# Patient Record
Sex: FEMALE | Race: WHITE | NOT HISPANIC OR LATINO | Employment: OTHER | URBAN - METROPOLITAN AREA
[De-identification: names, ages, dates, MRNs, and addresses within clinical notes are randomized per-mention and may not be internally consistent; named-entity substitution may affect disease eponyms.]

---

## 2017-01-09 ENCOUNTER — TELEPHONE (OUTPATIENT)
Dept: INTERNAL MEDICINE | Facility: CLINIC | Age: 62
End: 2017-01-09

## 2017-01-09 DIAGNOSIS — R11.0 NAUSEA: ICD-10-CM

## 2017-01-09 RX ORDER — ONDANSETRON HYDROCHLORIDE 8 MG/1
TABLET, FILM COATED ORAL
Qty: 30 TABLET | Refills: 0 | Status: SHIPPED | OUTPATIENT
Start: 2017-01-09 | End: 2017-03-03 | Stop reason: SDUPTHER

## 2017-01-09 NOTE — TELEPHONE ENCOUNTER
----- Message from Peri Philip sent at 1/9/2017  2:21 PM CST -----  Please schedule some labs for pt on 1-26-17. Pt can be reach at [phone].

## 2017-01-09 NOTE — TELEPHONE ENCOUNTER
Pt missed her lab appt in Nov. I got her rescheduled with a lipid panel, do you want to add any other labs at this time?

## 2017-01-10 DIAGNOSIS — I10 ESSENTIAL HYPERTENSION: ICD-10-CM

## 2017-01-10 DIAGNOSIS — Z00.00 ANNUAL PHYSICAL EXAM: Primary | ICD-10-CM

## 2017-01-16 ENCOUNTER — CLINICAL SUPPORT (OUTPATIENT)
Dept: SMOKING CESSATION | Facility: CLINIC | Age: 62
End: 2017-01-16
Payer: COMMERCIAL

## 2017-01-16 DIAGNOSIS — F17.210 SMOKES 11 TO 20 CIGARETTES PER DAY: Primary | ICD-10-CM

## 2017-01-16 PROCEDURE — 99999 PR PBB SHADOW E&M-EST. PATIENT-LVL II: CPT | Mod: PBBFAC,,,

## 2017-01-16 PROCEDURE — 99407 BEHAV CHNG SMOKING > 10 MIN: CPT | Mod: S$GLB,,, | Performed by: GENERAL PRACTICE

## 2017-01-16 NOTE — PROGRESS NOTES
Individual Follow-Up Form    1/16/2017      Clinical Status of Patient: Outpatient    Length of Service: 15 minutes    Continuing Medication: yes  Patches    Other Medications: none     Target Symptoms: Withdrawal and medication side effects. The following were  rated moderate (3) to severe (4) on TCRS:  · Moderate (3): none  · Severe (4): none    Comments: Patient reports smoking 8-10 per day. She states she has been trying hard to reduce her smoking but needs extra help. Educated on habit and daily routine. Patient agreed to return to clinic for more education.     Diagnosis: F17.210    Next Visit: 1 week

## 2017-01-19 RX ORDER — CHLOPHEDIANOL HYDROCHLORIDE, DEXBROMPHENIRAMINE MALEATE, PSEUDOEPHEDRINE HYDROCHLORIDE 12.5; 1; 3 MG/5ML; MG/5ML; MG/5ML
LIQUID ORAL
Qty: 120 ML | OUTPATIENT
Start: 2017-01-19

## 2017-01-19 NOTE — TELEPHONE ENCOUNTER
----- Message from Africa Henriquez sent at 1/19/2017  1:52 PM CST -----  pls include liver panel in bloodwork..[phone]

## 2017-01-23 ENCOUNTER — TELEPHONE (OUTPATIENT)
Dept: INTERNAL MEDICINE | Facility: CLINIC | Age: 62
End: 2017-01-23

## 2017-01-23 DIAGNOSIS — Z79.899 MEDICATION MANAGEMENT: Primary | ICD-10-CM

## 2017-01-23 NOTE — TELEPHONE ENCOUNTER
Spoke to pt and she sees [facility], at her last visit she discussed with Dr Rucker that she takes a lot of tylenol for break through pain. He advised that she have a hepatic panel run. She is scheduled for labs next week and wants to see if they can be ordered and added to the other labs? Please advise, thanks    Pt also wanted to see if you will send her a Rx for (Harris D?) cough syrup?

## 2017-01-23 NOTE — TELEPHONE ENCOUNTER
----- Message from Irma Salas sent at 1/20/2017  7:57 AM CST -----  Contact: pt  Pt returning nurse Lorena adam, please call pt @ [phone].

## 2017-01-24 NOTE — TELEPHONE ENCOUNTER
CMP ordered  I am not familiar with Harris D? I don't know if this was misspelled. I saw Ms. Moses over a month ago and gave a cough syrup but do not recall that being the one.

## 2017-01-26 ENCOUNTER — HOSPITAL ENCOUNTER (OUTPATIENT)
Dept: RADIOLOGY | Facility: HOSPITAL | Age: 62
Discharge: HOME | End: 2017-01-26
Attending: FAMILY MEDICINE
Payer: MEDICARE

## 2017-01-26 ENCOUNTER — CLINICAL SUPPORT (OUTPATIENT)
Dept: SMOKING CESSATION | Facility: CLINIC | Age: 62
End: 2017-01-26
Payer: COMMERCIAL

## 2017-01-26 DIAGNOSIS — F17.200 TOBACCO USE DISORDER: Primary | ICD-10-CM

## 2017-01-26 DIAGNOSIS — F17.210 SMOKES 21 TO 30 CIGARETTES PER DAY: ICD-10-CM

## 2017-01-26 DIAGNOSIS — Z12.31 SCREENING MAMMOGRAM, ENCOUNTER FOR: ICD-10-CM

## 2017-01-26 PROCEDURE — 77067 SCR MAMMO BI INCL CAD: CPT | Mod: TC

## 2017-01-26 PROCEDURE — 99999 PR PBB SHADOW E&M-EST. PATIENT-LVL I: CPT | Mod: PBBFAC,,,

## 2017-01-26 PROCEDURE — 77067 SCR MAMMO BI INCL CAD: CPT | Mod: 26,,, | Performed by: RADIOLOGY

## 2017-01-26 PROCEDURE — 90853 GROUP PSYCHOTHERAPY: CPT | Mod: S$GLB,,, | Performed by: GENERAL PRACTICE

## 2017-01-26 RX ORDER — IBUPROFEN 200 MG
2 TABLET ORAL DAILY
Qty: 28 PATCH | Refills: 0 | Status: SHIPPED | OUTPATIENT
Start: 2017-01-26 | End: 2017-10-30 | Stop reason: SDUPTHER

## 2017-01-26 NOTE — PROGRESS NOTES
Smoking Cessation Group Session #3    Site: [facility]  Date:  1/26/2017  Clinical Status of Patient: Outpatient   Length of Service and Code: 90 minutes - [id number]   Number in Attendance: 8  Group Activities/Focus of Group:  Sharing last weeks challenges, triggers, and coping activities to remain quit and/ or keep making progress toward cessation, completion of TCRS (Tobacco Cessation Rating Scale) learned addiction model, personal reasons for quitting, medications, goals, quit date.    Specific session focus: completion of TCRS (Tobacco Cessation Rating Scale) reviewed strategies, controlling environment, cues, triggers, new goals set. Introduced high risk situations with preparation interventions, caffeine similarities with withdrawal issues of habit and nicotine, Alcohol, Understanding urges, cravings, stress and relaxation. Open discussion with intervention discussion.      Target symptoms:  withdrawal and medication side effects             The following were rated moderate (3) to seere (4) on TCRS:       Moderate 3: none     Severe 4:   none  Patient's Response to Intervention: Patient is still smoking a pack or more per day  Progress Toward Goals and Other Mental Status Changes: The patient denies any abnormal behavioral or mental changes at this time.     Goal/ recommit to the program and the process    Diagnosis: Z72.0  Plan: The patient will continue with group therapy sessions and medication regimen prescribed with management by physician or by the Cessation Clinic Provider. Patient will inform Smoking Cessation Counselor of symptoms as rated high on TCRS.    Return to Clinic: 1 week    Quit Date:    Planned Quit Date:

## 2017-01-27 ENCOUNTER — PATIENT MESSAGE (OUTPATIENT)
Dept: INTERNAL MEDICINE | Facility: CLINIC | Age: 62
End: 2017-01-27

## 2017-01-27 ENCOUNTER — TELEPHONE (OUTPATIENT)
Dept: INTERNAL MEDICINE | Facility: CLINIC | Age: 62
End: 2017-01-27

## 2017-01-27 DIAGNOSIS — R76.8 POSITIVE HEPATITIS C ANTIBODY TEST: Primary | ICD-10-CM

## 2017-01-27 NOTE — TELEPHONE ENCOUNTER
----- Message from Radha Espinoza MD sent at 1/27/2017 12:05 PM CST -----  Cholesterol is still elevated. It appears that we may need to start medication if she would like to start something I can send it and she follow up in 2 months. If she want to discuss it first please schedule appointment.  Metabolic panel was normal.   Hep C antibody was positive and I would like to get follow up labs. They have been ordered today.

## 2017-01-31 ENCOUNTER — TELEPHONE (OUTPATIENT)
Dept: INTERNAL MEDICINE | Facility: CLINIC | Age: 62
End: 2017-01-31

## 2017-01-31 ENCOUNTER — LAB VISIT (OUTPATIENT)
Dept: LAB | Facility: HOSPITAL | Age: 62
End: 2017-01-31
Attending: FAMILY MEDICINE
Payer: MEDICARE

## 2017-01-31 DIAGNOSIS — R76.8 POSITIVE HEPATITIS C ANTIBODY TEST: ICD-10-CM

## 2017-01-31 DIAGNOSIS — E78.5 HYPERLIPIDEMIA, UNSPECIFIED HYPERLIPIDEMIA TYPE: Primary | ICD-10-CM

## 2017-01-31 PROCEDURE — 87522 HEPATITIS C REVRS TRNSCRPJ: CPT

## 2017-01-31 PROCEDURE — 36415 COLL VENOUS BLD VENIPUNCTURE: CPT

## 2017-01-31 PROCEDURE — 87902 NFCT AGT GNTYP ALYS HEP C: CPT

## 2017-01-31 RX ORDER — ATORVASTATIN CALCIUM 20 MG/1
20 TABLET, FILM COATED ORAL DAILY
Qty: 30 TABLET | Refills: 1 | Status: SHIPPED | OUTPATIENT
Start: 2017-01-31 | End: 2018-11-06

## 2017-01-31 NOTE — TELEPHONE ENCOUNTER
----- Message from Carolin Ricketts sent at 1/31/2017  9:47 AM CST -----  Contact: self [phone]  States that she is calling to let nurse know that she does not want Crestor called in for her. States that she has taken it in the past and it caused chest pain. Please call back at [phone]//thank you acc

## 2017-01-31 NOTE — TELEPHONE ENCOUNTER
----- Message from Xochilt Birch sent at 1/30/2017  1:35 PM CST -----  Contact: pt   PT calling to speak with Lorena

## 2017-01-31 NOTE — TELEPHONE ENCOUNTER
Notified pt of results and recommendations. Pt verbalized understanding and scheduled to do lab work today. She also request a copy of her labs to be picked up when she comes in today for repeat labs. (printed and put at ). Pt expressed that she does want to try the cholesterol medication to be sent to [facility] pharmacy and scheduled 2 mth f/u and mailed to pt.

## 2017-02-03 ENCOUNTER — PATIENT MESSAGE (OUTPATIENT)
Dept: INTERNAL MEDICINE | Facility: CLINIC | Age: 62
End: 2017-02-03

## 2017-02-03 DIAGNOSIS — B19.20 HEPATITIS C VIRUS INFECTION WITHOUT HEPATIC COMA, UNSPECIFIED CHRONICITY: Primary | ICD-10-CM

## 2017-02-03 LAB
HCV GENTYP SERPL NAA+PROBE: ABNORMAL
HCV QUALITATIVE RESULT: DETECTED
HCV QUANTITATIVE LOG: 6.55 LOG (10) IU/ML
HCV RNA SPEC NAA+PROBE-ACNC: ABNORMAL IU/ML

## 2017-02-07 ENCOUNTER — PATIENT MESSAGE (OUTPATIENT)
Dept: INTERNAL MEDICINE | Facility: CLINIC | Age: 62
End: 2017-02-07

## 2017-02-17 ENCOUNTER — TELEPHONE (OUTPATIENT)
Dept: INTERNAL MEDICINE | Facility: CLINIC | Age: 62
End: 2017-02-17

## 2017-02-17 DIAGNOSIS — M81.0 OSTEOPOROSIS: Primary | ICD-10-CM

## 2017-02-20 ENCOUNTER — PATIENT MESSAGE (OUTPATIENT)
Dept: INTERNAL MEDICINE | Facility: CLINIC | Age: 62
End: 2017-02-20

## 2017-02-20 ENCOUNTER — TELEPHONE (OUTPATIENT)
Dept: INTERNAL MEDICINE | Facility: CLINIC | Age: 62
End: 2017-02-20

## 2017-02-20 NOTE — TELEPHONE ENCOUNTER
----- Message from Megan Pierre sent at 2/20/2017  9:28 AM CST -----  Contact: pt  Pt left a msg on Fri to be called back with her bone density test results and did not hear back from anyone.  Please call pt ASAP today at the above number because she needs these results before another appt she has.

## 2017-02-22 ENCOUNTER — TELEPHONE (OUTPATIENT)
Dept: INTERNAL MEDICINE | Facility: CLINIC | Age: 62
End: 2017-02-22

## 2017-02-22 NOTE — TELEPHONE ENCOUNTER
Spoke to pt and she was not able to view the Bone Density scan online so she was requesting the results.  Advised pt of results and recommendations.   Pt also wanted to let Dr Espinoza know she stopped the atorvastatin due to leg cramps. Advised pt to schedule an appt with Dr Espinoza to discuss options for chol and osteoporosis. Pt was at another appt today and had to get off the phone, also pt wanted to cancel her appt with Gastro, Dr Ledesma for Hep C. Cancelled appt.   Pt states she will get back with the office later.

## 2017-02-23 ENCOUNTER — TELEPHONE (OUTPATIENT)
Dept: INTERNAL MEDICINE | Facility: CLINIC | Age: 62
End: 2017-02-23

## 2017-02-23 NOTE — TELEPHONE ENCOUNTER
----- Message from Tino Lopez sent at 2/21/2017  7:52 AM CST -----  Contact: pt   States she's calling rg getting a copy of her bone density report and states it's not pulling up in the [facility] system and needs to get it today for an appt and can be reached at [phone]//thanks/dbw

## 2017-03-03 ENCOUNTER — OFFICE VISIT (OUTPATIENT)
Dept: INTERNAL MEDICINE | Facility: CLINIC | Age: 62
End: 2017-03-03
Payer: MEDICARE

## 2017-03-03 VITALS
HEIGHT: 67 IN | OXYGEN SATURATION: 97 % | HEART RATE: 78 BPM | BODY MASS INDEX: 29.27 KG/M2 | DIASTOLIC BLOOD PRESSURE: 76 MMHG | WEIGHT: 186.5 LBS | TEMPERATURE: 98 F | SYSTOLIC BLOOD PRESSURE: 134 MMHG

## 2017-03-03 DIAGNOSIS — R06.2 WHEEZING: Primary | ICD-10-CM

## 2017-03-03 DIAGNOSIS — R05.9 COUGH: ICD-10-CM

## 2017-03-03 DIAGNOSIS — M81.0 OSTEOPOROSIS: ICD-10-CM

## 2017-03-03 DIAGNOSIS — R11.0 NAUSEA: ICD-10-CM

## 2017-03-03 DIAGNOSIS — Z12.11 ENCOUNTER FOR SCREENING COLONOSCOPY: ICD-10-CM

## 2017-03-03 PROCEDURE — 99213 OFFICE O/P EST LOW 20 MIN: CPT | Mod: PBBFAC | Performed by: FAMILY MEDICINE

## 2017-03-03 PROCEDURE — 99999 PR PBB SHADOW E&M-EST. PATIENT-LVL III: CPT | Mod: PBBFAC,,, | Performed by: FAMILY MEDICINE

## 2017-03-03 PROCEDURE — 99214 OFFICE O/P EST MOD 30 MIN: CPT | Mod: S$PBB,,, | Performed by: FAMILY MEDICINE

## 2017-03-03 PROCEDURE — 94640 AIRWAY INHALATION TREATMENT: CPT | Mod: PBBFAC,59

## 2017-03-03 PROCEDURE — 96372 THER/PROPH/DIAG INJ SC/IM: CPT | Mod: PBBFAC

## 2017-03-03 RX ORDER — ALENDRONATE SODIUM 70 MG/1
70 TABLET ORAL
Qty: 4 TABLET | Refills: 5 | Status: SHIPPED | OUTPATIENT
Start: 2017-03-03 | End: 2018-11-06 | Stop reason: SDUPTHER

## 2017-03-03 RX ORDER — METHYLPREDNISOLONE 4 MG/1
TABLET ORAL
Qty: 1 PACKAGE | Refills: 0 | Status: SHIPPED | OUTPATIENT
Start: 2017-03-03 | End: 2017-03-24

## 2017-03-03 RX ORDER — ONDANSETRON HYDROCHLORIDE 8 MG/1
TABLET, FILM COATED ORAL
Qty: 30 TABLET | Refills: 0 | Status: SHIPPED | OUTPATIENT
Start: 2017-03-03 | End: 2017-04-13 | Stop reason: SDUPTHER

## 2017-03-03 RX ORDER — ALBUTEROL SULFATE 2.5 MG/.5ML
2.5 SOLUTION RESPIRATORY (INHALATION)
Status: COMPLETED | OUTPATIENT
Start: 2017-03-03 | End: 2017-03-03

## 2017-03-03 RX ORDER — METHYLPREDNISOLONE ACETATE 40 MG/ML
40 INJECTION, SUSPENSION INTRA-ARTICULAR; INTRALESIONAL; INTRAMUSCULAR; SOFT TISSUE
Status: COMPLETED | OUTPATIENT
Start: 2017-03-03 | End: 2017-03-03

## 2017-03-03 RX ADMIN — ALBUTEROL SULFATE 2.5 MG: 2.5 SOLUTION RESPIRATORY (INHALATION) at 09:03

## 2017-03-03 RX ADMIN — METHYLPREDNISOLONE ACETATE 40 MG: 40 INJECTION, SUSPENSION INTRALESIONAL; INTRAMUSCULAR; INTRASYNOVIAL; SOFT TISSUE at 09:03

## 2017-03-03 NOTE — MR AVS SNAPSHOT
[facility]  [address]  [place] [address]  Phone: [phone]  Fax: [phone]                  Luz Marina Moses   3/3/2017 8:20 AM   Office Visit    Description:  Female : 1955   Provider:  Radha Espinoza MD   Department:  [facility]           Reason for Visit     URI     Sore Throat     Cough           Diagnoses this Visit        Comments    Wheezing    -  Primary     Nausea         Cough         Osteoporosis         Encounter for screening colonoscopy                To Do List           Future Appointments        Provider Department Dept Phone    3/24/2017 8:30 AM Brett Ledesma III, MD [facility] [phone]    3/31/2017 9:00 AM Radha Espinoza MD [facility] [phone]    2017 10:40 AM Radha Espinoza MD [facility] [phone]      Goals (5 Years of Data)     None       These Medications        Disp Refills Start End    ondansetron (ZOFRAN) 8 MG tablet 30 tablet 0 3/3/2017     Take 1 tablet (8 mg total) by mouth every 6 to 8 hours as needed (Nausea).    Pharmacy: [facility] [place] [id number] [facility] Ph #: [phone]       methylPREDNISolone (MEDROL DOSEPACK) 4 mg tablet 1 Package 0 3/3/2017 3/24/2017    use as directed    Pharmacy: [place] [id number] [facility] Ph #: [phone]       chlorpheniramine-phenyleph-DM 4-10-20 mg Tab 1 each 0 3/3/2017 3/13/2017    Take 5 mLs by mouth 3 (three) times daily as needed. - Oral    Pharmacy: [facility] [place] [id number] [facility] Ph #: [phone]       Notes to Pharmacy: Dispense 1 bottle- 350 mL    alendronate (FOSAMAX) 70 MG tablet 4 tablet 5 3/3/2017 3/3/2018    Take 1 tablet (70 mg total) by mouth every 7 days. - Oral    Pharmacy: [facility] [place] [id number] [facility] Ph #: [phone]         [facility]     [facility]  [facility] - 24/7 Assistance  Registered nurses in the [facility] provide clinical advisement, health education, appointment booking, and other advisory services.  Call for this free service at [phone].             Medications           Message regarding Medications     Verify the changes and/or additions to your medication regime listed below are the same as discussed with your clinician today.  If any of these changes or additions are incorrect, please notify your healthcare provider.        START taking these NEW medications        Refills    methylPREDNISolone (MEDROL DOSEPACK) 4 mg tablet 0    Sig: use as directed    Class: Normal    chlorpheniramine-phenyleph-DM 4-10-20 mg Tab 0    Sig: Take 5 mLs by mouth 3 (three) times daily as needed.    Class: Normal    Route: Oral    alendronate (FOSAMAX) 70 MG tablet 5    Sig: Take 1 tablet (70 mg total) by mouth every 7 days.    Class: Normal    Route: Oral      These medications were administered today        Dose Freq    methylPREDNISolone acetate injection 40 mg 40 mg Clinic/HOD 1 time    Sig: Inject 1 mL (40 mg total) into the muscle one time.    Class: Normal    Route: Intramuscular    albuterol sulfate nebulizer solution 2.5 mg 2.5 mg Clinic/HOD 1 time    Sig: Take 2.5 mg by nebulization one time.    Class: Normal    Route: Nebulization           Verify that the below list of medications is an accurate representation of the medications you are currently taking.  If none reported, the list may be blank. If incorrect, please contact your healthcare provider. Carry this list with you in case of emergency.           Current Medications     albuterol (PROAIR HFA) 90 mcg/actuation inhaler Inhale 2 puffs into the lungs every 6 (six) hours as needed for Wheezing.    atorvastatin (LIPITOR) 20 MG tablet Take 1 tablet (20 mg total) by mouth once daily.    fluticasone (FLONASE) 50 mcg/actuation nasal spray 2 sprays by Each Nare route once daily.    gabapentin  "(NEURONTIN) 600 MG tablet Take 600 mg by mouth 3 (three) times daily.    lisinopril (PRINIVIL,ZESTRIL) 20 MG tablet Take 1 tablet (20 mg total) by mouth once daily.    meclizine (ANTIVERT) 25 mg tablet Take 1 tablet (25 mg total) by mouth 3 (three) times daily as needed.    morphine (MSIR) 15 MG tablet 15 mg every 6 (six) hours as needed.     nicotine (NICODERM CQ) 21 mg/24 hr Place 2 patches onto the skin once daily. Due to high amount of daily smoking. MD approved    ondansetron (ZOFRAN) 8 MG tablet Take 1 tablet (8 mg total) by mouth every 6 to 8 hours as needed (Nausea).    tizanidine (ZANAFLEX) 4 MG tablet Take 4 mg by mouth every 6 (six) hours as needed.    triamterene-hydrochlorothiazide 37.5-25 mg (DYAZIDE) 37.5-25 mg per capsule Take  1 PO ONCE Day    alendronate (FOSAMAX) 70 MG tablet Take 1 tablet (70 mg total) by mouth every 7 days.    chlorpheniramine-phenyleph-DM 4-10-20 mg Tab Take 5 mLs by mouth 3 (three) times daily as needed.    duloxetine (CYMBALTA) 30 MG capsule Take 1 capsule (30 mg total) by mouth once daily.    methylPREDNISolone (MEDROL DOSEPACK) 4 mg tablet use as directed           Clinical Reference Information           Your Vitals Were     BP Pulse Temp Height Weight SpO2    134/76 78 97.8 °F (36.6 °C) (Tympanic) 5' 7" (1.702 m) 84.6 kg (186 lb 8.2 oz) 97%    BMI                29.21 kg/m2          Blood Pressure          Most Recent Value    BP  134/76      Allergies as of 3/3/2017     Red Dye    Augmentin [Amoxicillin-pot Clavulanate]    Xarelto [Rivaroxaban]      Immunizations Administered on Date of Encounter - 3/3/2017     None      Orders Placed During Today's Visit      Normal Orders This Visit    Case request GI: COLONOSCOPY       Administrations This Visit     methylPREDNISolone acetate injection 40 mg     Admin Date Action Dose Route Administered By             03/03/2017 Given 40 mg Intramuscular Lorena Abebe LPN                      Smoking Cessation     If you would " like to quit smoking:   You may be eligible for free services if you are a [place] resident and started smoking cigarettes before September 1, 1988.  Call the [employer]oking [employer]essation [employer] (SCT) toll free at (232) 870-3677 or (589) 661-6355.   Call 1-800-QUIT-NOW if you do not meet the above criteria.            Language Assistance Services     ATTENTION: Language assistance services are available, free of charge. Please call [phone].      ATENCIÓN: Si habla español, tiene a koroma disposición servicios gratuitos de asistencia lingüística. Llame al [phone].     CHÚ Ý: N?u b?n nói Ti?ng Vi?t, có các d?ch v? h? tr? ngôn ng? mi?n phí dành cho b?n. G?i s? [phone].         [facility] complies with applicable Federal civil rights laws and does not discriminate on the basis of race, color, national origin, age, disability, or sex.

## 2017-03-03 NOTE — PROGRESS NOTES
Subjective:       Patient ID: Luz Marina Moses is a 62 y.o. female.    Chief Complaint: URI; Sore Throat; and Cough    HPI  Ms Moses presents with URI symptoms she says that she had a sore throat started last week and it went away. This weekend lost voice and this morning sore throat and glands are sore. Coughing and feels she has a rattle. Uses inhaler a couple times a day this week but usually uses it once in the morning. Feels she was wheezing last night and it resolved with the use of albuterol.   Warm liquid is soothing. No tonsils.     She also asks about osteoporosis and her bone scan. She took fosomax once or twice and got nervous after her sister said something about it eating through her esophagus and would like to take it again following the directions.     Review of Systems   Constitutional: Negative for activity change, appetite change, fatigue and fever.   HENT: Positive for congestion. Negative for ear pain and sinus pressure.    Eyes: Negative for pain and visual disturbance.   Respiratory: Positive for cough, shortness of breath and wheezing. Negative for chest tightness.    Cardiovascular: Negative for chest pain, palpitations and leg swelling.   Gastrointestinal: Negative for abdominal distention, abdominal pain, constipation, diarrhea, nausea and vomiting.   Endocrine: Negative for polydipsia and polyuria.   Genitourinary: Negative for difficulty urinating, dyspareunia, dysuria, flank pain and hematuria.   Musculoskeletal: Negative for arthralgias and back pain.   Skin: Negative for rash.   Neurological: Negative for dizziness, tremors, syncope, weakness, numbness and headaches.   Psychiatric/Behavioral: Negative for agitation and confusion.         Objective:        Physical Exam   Constitutional: She is oriented to person, place, and time. She appears well-developed and well-nourished.   HENT:   Head: Normocephalic and atraumatic.       Cardiovascular: Regular rhythm and normal heart sounds.     Pulmonary/Chest: Effort normal. No respiratory distress. She has wheezes.   Neurological: She is alert and oriented to person, place, and time.   Nursing note and vitals reviewed.        Assessment/Plan:   Wheezing  -     methylPREDNISolone acetate injection 40 mg; Inject 1 mL (40 mg total) into the muscle one time.  -     methylPREDNISolone (MEDROL DOSEPACK) 4 mg tablet; use as directed  Dispense: 1 Package; Refill: 0  -     albuterol sulfate nebulizer solution 2.5 mg; Take 2.5 mg by nebulization one time.    Nausea  -     ondansetron (ZOFRAN) 8 MG tablet; Take 1 tablet (8 mg total) by mouth every 6 to 8 hours as needed (Nausea).  Dispense: 30 tablet; Refill: 0    Cough  -     methylPREDNISolone acetate injection 40 mg; Inject 1 mL (40 mg total) into the muscle one time.  -     methylPREDNISolone (MEDROL DOSEPACK) 4 mg tablet; use as directed  Dispense: 1 Package; Refill: 0  -     albuterol sulfate nebulizer solution 2.5 mg; Take 2.5 mg by nebulization one time.  -     chlorpheniramine-phenyleph-DM 4-10-20 mg Tab; Take 5 mLs by mouth 3 (three) times daily as needed.  Dispense: 1 each; Refill: 0  Refilled cough medication because she is allergic to red dye and can't take most of the OTC medications. Feels safer when the pharmacist puts it together.     Osteoporosis  -     alendronate (FOSAMAX) 70 MG tablet; Take 1 tablet (70 mg total) by mouth every 7 days.  Dispense: 4 tablet; Refill: 5  Restarted Fosamax today.   Holiday considered after a 4-5 years will reassess in 3 years.     Encounter for screening colonoscopy  -     Case request GI: COLONOSCOPY      Return if symptoms worsen or fail to improve.    Radha Espinoza MD  [facility]   Family Medicine

## 2017-04-13 DIAGNOSIS — R11.0 NAUSEA: ICD-10-CM

## 2017-04-13 RX ORDER — ONDANSETRON HYDROCHLORIDE 8 MG/1
TABLET, FILM COATED ORAL
Qty: 30 TABLET | Refills: 1 | Status: SHIPPED | OUTPATIENT
Start: 2017-04-13 | End: 2017-07-06 | Stop reason: SDUPTHER

## 2017-04-19 ENCOUNTER — CLINICAL SUPPORT (OUTPATIENT)
Dept: SMOKING CESSATION | Facility: CLINIC | Age: 62
End: 2017-04-19
Payer: COMMERCIAL

## 2017-04-19 DIAGNOSIS — F17.200 NICOTINE DEPENDENCE: Primary | ICD-10-CM

## 2017-04-19 PROCEDURE — 99407 BEHAV CHNG SMOKING > 10 MIN: CPT | Mod: S$GLB,,, | Performed by: INTERNAL MEDICINE

## 2017-05-22 DIAGNOSIS — R42 VERTIGO: ICD-10-CM

## 2017-05-22 DIAGNOSIS — J44.1 COPD WITH EXACERBATION: ICD-10-CM

## 2017-05-22 RX ORDER — MECLIZINE HYDROCHLORIDE 25 MG/1
25 TABLET ORAL 3 TIMES DAILY PRN
Qty: 30 TABLET | Refills: 0 | Status: SHIPPED | OUTPATIENT
Start: 2017-05-22 | End: 2017-07-06 | Stop reason: SDUPTHER

## 2017-05-22 RX ORDER — ALBUTEROL SULFATE 90 UG/1
2 AEROSOL, METERED RESPIRATORY (INHALATION) EVERY 6 HOURS PRN
Qty: 8 G | Refills: 1 | Status: SHIPPED | OUTPATIENT
Start: 2017-05-22 | End: 2018-05-08 | Stop reason: SDUPTHER

## 2017-05-23 DIAGNOSIS — I10 ESSENTIAL HYPERTENSION: ICD-10-CM

## 2017-05-23 RX ORDER — LISINOPRIL 20 MG/1
20 TABLET ORAL DAILY
Qty: 90 TABLET | Refills: 1 | Status: SHIPPED | OUTPATIENT
Start: 2017-05-23 | End: 2018-05-08 | Stop reason: SDUPTHER

## 2017-05-23 RX ORDER — TRIAMTERENE/HYDROCHLOROTHIAZID 37.5-25 MG
TABLET ORAL
Qty: 90 TABLET | Refills: 1 | Status: SHIPPED | OUTPATIENT
Start: 2017-05-23 | End: 2018-05-09 | Stop reason: SDUPTHER

## 2017-07-06 DIAGNOSIS — R11.0 NAUSEA: ICD-10-CM

## 2017-07-06 DIAGNOSIS — R42 VERTIGO: ICD-10-CM

## 2017-07-06 RX ORDER — MECLIZINE HYDROCHLORIDE 25 MG/1
25 TABLET ORAL 3 TIMES DAILY PRN
Qty: 30 TABLET | Refills: 0 | Status: SHIPPED | OUTPATIENT
Start: 2017-07-06 | End: 2017-09-13 | Stop reason: SDUPTHER

## 2017-07-06 RX ORDER — ONDANSETRON HYDROCHLORIDE 8 MG/1
TABLET, FILM COATED ORAL
Qty: 30 TABLET | Refills: 0 | Status: SHIPPED | OUTPATIENT
Start: 2017-07-06 | End: 2018-11-06 | Stop reason: SDUPTHER

## 2017-09-13 DIAGNOSIS — R42 VERTIGO: ICD-10-CM

## 2017-09-13 RX ORDER — MECLIZINE HYDROCHLORIDE 25 MG/1
25 TABLET ORAL 3 TIMES DAILY PRN
Qty: 30 TABLET | Refills: 0 | Status: SHIPPED | OUTPATIENT
Start: 2017-09-13 | End: 2018-05-08 | Stop reason: SDUPTHER

## 2017-10-12 ENCOUNTER — TELEPHONE (OUTPATIENT)
Dept: SMOKING CESSATION | Facility: CLINIC | Age: 62
End: 2017-10-12

## 2017-10-30 ENCOUNTER — CLINICAL SUPPORT (OUTPATIENT)
Dept: SMOKING CESSATION | Facility: CLINIC | Age: 62
End: 2017-10-30
Payer: COMMERCIAL

## 2017-10-30 DIAGNOSIS — F17.200 NICOTINE DEPENDENCE: Primary | ICD-10-CM

## 2017-10-30 PROCEDURE — 99404 PREV MED CNSL INDIV APPRX 60: CPT | Mod: S$GLB,,,

## 2017-10-30 PROCEDURE — 99999 PR PBB SHADOW E&M-EST. PATIENT-LVL II: CPT | Mod: PBBFAC,,,

## 2017-10-30 RX ORDER — BUPROPION HYDROCHLORIDE 150 MG/1
150 TABLET, EXTENDED RELEASE ORAL 2 TIMES DAILY
Qty: 60 TABLET | Refills: 11 | Status: SHIPPED | OUTPATIENT
Start: 2017-10-30 | End: 2017-11-28

## 2017-10-30 RX ORDER — IBUPROFEN 200 MG
1 TABLET ORAL DAILY
Qty: 14 PATCH | Refills: 0 | Status: SHIPPED | OUTPATIENT
Start: 2017-10-30 | End: 2017-11-13 | Stop reason: SDUPTHER

## 2017-11-03 DIAGNOSIS — J30.1 SEASONAL ALLERGIC RHINITIS DUE TO POLLEN: ICD-10-CM

## 2017-11-03 RX ORDER — FLUTICASONE PROPIONATE 50 MCG
2 SPRAY, SUSPENSION (ML) NASAL DAILY
Qty: 16 G | Refills: 3 | Status: SHIPPED | OUTPATIENT
Start: 2017-11-03 | End: 2018-04-19 | Stop reason: SDUPTHER

## 2017-11-13 ENCOUNTER — TELEPHONE (OUTPATIENT)
Dept: SMOKING CESSATION | Facility: CLINIC | Age: 62
End: 2017-11-13

## 2017-11-13 DIAGNOSIS — F17.200 NICOTINE DEPENDENCE: ICD-10-CM

## 2017-11-13 RX ORDER — IBUPROFEN 200 MG
1 TABLET ORAL DAILY
Qty: 14 PATCH | Refills: 0 | Status: SHIPPED | OUTPATIENT
Start: 2017-11-13 | End: 2017-11-14 | Stop reason: SDUPTHER

## 2017-11-14 ENCOUNTER — CLINICAL SUPPORT (OUTPATIENT)
Dept: SMOKING CESSATION | Facility: CLINIC | Age: 62
End: 2017-11-14
Payer: COMMERCIAL

## 2017-11-14 DIAGNOSIS — F17.200 NICOTINE DEPENDENCE: ICD-10-CM

## 2017-11-14 PROCEDURE — 99999 PR PBB SHADOW E&M-EST. PATIENT-LVL I: CPT | Mod: PBBFAC,,,

## 2017-11-14 PROCEDURE — 99404 PREV MED CNSL INDIV APPRX 60: CPT | Mod: S$GLB,,,

## 2017-11-14 RX ORDER — IBUPROFEN 200 MG
1 TABLET ORAL DAILY
Qty: 14 PATCH | Refills: 0 | Status: SHIPPED | OUTPATIENT
Start: 2017-11-14 | End: 2017-11-28 | Stop reason: DRUGHIGH

## 2017-11-14 NOTE — PROGRESS NOTES
Individual Follow-Up Form    11/14/2017    Quit Date:     Clinical Status of Patient: Outpatient    Length of Service: 60 minutes    Continuing Medication: yes  Patches    Other Medications:      Target Symptoms: Withdrawal and medication side effects. The following were  rated moderate (3) to severe (4) on TCRS:  · Moderate (3): desire, crave tobacco, increased appetite; reviewed with the patient habit component of tobacco being stronger than nicotine after speaking with the patient.   · Severe (4): none    Comments:  orientation, completion of TCRS (Tobacco Cessation Rating Scale) learned addiction model, cues/triggers, personal reasons for quitting, medications, goals, quit date. The patient is smoking 30 cigarettes per day and will begin the reduction strategy on 11/15/17. The patient is experiencing the following negative side effects: desire, crave tobacco, increased appetite; reviewed with the patient habit component of tobacco being stronger than nicotine after speaking with the patient. CO 26 ppm with the last cigarette being smoked 10 minutes prior. Patient remains on prescribed tobacco cessation medication regimen of 21 mg patch without any negative side effects at this time.    Diagnosis: F17.210    Next Visit: 1 week

## 2017-11-14 NOTE — Clinical Note
The patient is smoking 30 cigarettes per day and will begin the reduction strategy on 11/15/17. The patient is experiencing the following negative side effects: desire, crave tobacco, increased appetite; reviewed with the patient habit component of tobacco being stronger than nicotine after speaking with the patient. CO 26 ppm with the last cigarette being smoked 10 minutes prior. Patient remains on prescribed tobacco cessation medication regimen of 21 mg patch without any negative side effects at this time.

## 2017-11-20 ENCOUNTER — TELEPHONE (OUTPATIENT)
Dept: SMOKING CESSATION | Facility: CLINIC | Age: 62
End: 2017-11-20

## 2017-11-20 NOTE — TELEPHONE ENCOUNTER
Patient left message on answering service. Unable to make individual session on 11/21/17 and confirmed next week on 11/28/17 at 11 am.

## 2017-11-28 ENCOUNTER — CLINICAL SUPPORT (OUTPATIENT)
Dept: SMOKING CESSATION | Facility: CLINIC | Age: 62
End: 2017-11-28
Payer: COMMERCIAL

## 2017-11-28 DIAGNOSIS — F17.200 NICOTINE DEPENDENCE: ICD-10-CM

## 2017-11-28 DIAGNOSIS — F17.210 SMOKES 21 TO 30 CIGARETTES PER DAY: Primary | ICD-10-CM

## 2017-11-28 PROCEDURE — 99999 PR PBB SHADOW E&M-EST. PATIENT-LVL I: CPT | Mod: PBBFAC,,,

## 2017-11-28 PROCEDURE — 99404 PREV MED CNSL INDIV APPRX 60: CPT | Mod: S$GLB,,,

## 2017-11-28 RX ORDER — IBUPROFEN 200 MG
2 TABLET ORAL DAILY
Qty: 28 PATCH | Refills: 0 | Status: SHIPPED | OUTPATIENT
Start: 2017-11-28 | End: 2018-05-08

## 2017-11-28 NOTE — PROGRESS NOTES
Individual Follow-Up Form    11/28/2017    Quit Date:     Clinical Status of Patient: Outpatient    Length of Service: 60 minutes    Continuing Medication: yes  Patches    Other Medications: Wellbutrin was discontinues secondary to patient not receiving therapeutic benefits of medication.       Target Symptoms: Withdrawal and medication side effects. The following were  rated moderate (3) to severe (4) on TCRS:  · Moderate (3): None  · Severe (4): None    Comments: completion of TCRS (Tobacco Cessation Rating Scale) reviewed strategies, cues, and triggers. Introduced the negative impact of tobacco on health, the health advantages of discontinuing the use of tobacco, time line improved health changes after a quit, withdrawal issues to expect from nicotine and habit, and ways to achieve the goal of a quit. Patient is smoking 30 per day , CO was 29 ppm smoking prior to visit 30 minutes.Wellbutrin was discontinued secondary to patient not receiving therapeutic benefits of medication. Patient was double patched secondary to pt not receiving thereupic value of a single 21mg patch. Pt is not experiencing any negative effects at this time. The patient denies any abnormal behavioral or mental changes at this time.   The patient will continue with group therapy sessions and medication monitoring by CTTS. Prescribed medication management will be by physician.         Diagnosis: F17.210    Next Visit: 1 week

## 2017-11-28 NOTE — Clinical Note
Patient is smoking 30 per day , CO was 29 ppm smoking prior to visit 30 minutes.Wellbutrin was discontinued secondary to patient not receiving therapeutic benefits of medication. Patient was double patched secondary to pt not receiving thereupic value of a single 21mg patch. Pt is not experiencing any negative effects at this time. The patient denies any abnormal behavioral or mental changes at this time.  The patient will continue with group therapy sessions and medication monitoring by CTTS. Prescribed medication management will be by physician.

## 2017-12-07 ENCOUNTER — TELEPHONE (OUTPATIENT)
Dept: SMOKING CESSATION | Facility: CLINIC | Age: 62
End: 2017-12-07

## 2017-12-07 NOTE — TELEPHONE ENCOUNTER
Left message about missed group session and about medication regimen. Left my name Andrade Garcia and phone number [phone].

## 2017-12-11 ENCOUNTER — TELEPHONE (OUTPATIENT)
Dept: SMOKING CESSATION | Facility: CLINIC | Age: 62
End: 2017-12-11

## 2017-12-12 ENCOUNTER — TELEPHONE (OUTPATIENT)
Dept: SMOKING CESSATION | Facility: CLINIC | Age: 62
End: 2017-12-12

## 2017-12-12 NOTE — TELEPHONE ENCOUNTER
Left message about missed intake for the tobacco cessation program and asked if the patient would like to reschedule. Left my name Denver Garcia with phone number [phone].

## 2017-12-18 ENCOUNTER — TELEPHONE (OUTPATIENT)
Dept: SMOKING CESSATION | Facility: CLINIC | Age: 62
End: 2017-12-18

## 2017-12-20 ENCOUNTER — PATIENT OUTREACH (OUTPATIENT)
Dept: ADMINISTRATIVE | Facility: HOSPITAL | Age: 62
End: 2017-12-20

## 2018-01-02 ENCOUNTER — TELEPHONE (OUTPATIENT)
Dept: SMOKING CESSATION | Facility: CLINIC | Age: 63
End: 2018-01-02

## 2018-01-05 ENCOUNTER — TELEPHONE (OUTPATIENT)
Dept: INTERNAL MEDICINE | Facility: CLINIC | Age: 63
End: 2018-01-05

## 2018-01-05 NOTE — TELEPHONE ENCOUNTER
----- Message from Ignacia Wadsworth sent at 1/5/2018  9:37 AM CST -----  Contact: Patient  Patient called and stated she has switched PCP's and keeps getting calls from [facility] to schedule. She would like the calls to stop. She can be contacted at [phone].    Thanks,  Ignacia

## 2018-01-05 NOTE — TELEPHONE ENCOUNTER
Pt has changed providers. No longer wants to be contacted for an appt. Updated pts provider list.

## 2018-04-14 DIAGNOSIS — J30.1 SEASONAL ALLERGIC RHINITIS DUE TO POLLEN: ICD-10-CM

## 2018-04-17 RX ORDER — FLUTICASONE PROPIONATE 50 MCG
SPRAY, SUSPENSION (ML) NASAL
Refills: 0 | OUTPATIENT
Start: 2018-04-17

## 2018-04-19 RX ORDER — FLUTICASONE PROPIONATE 50 MCG
2 SPRAY, SUSPENSION (ML) NASAL DAILY
Qty: 16 G | Refills: 0 | Status: SHIPPED | OUTPATIENT
Start: 2018-04-19 | End: 2018-05-08 | Stop reason: SDUPTHER

## 2018-05-08 ENCOUNTER — OFFICE VISIT (OUTPATIENT)
Dept: FAMILY MEDICINE | Facility: CLINIC | Age: 63
End: 2018-05-08
Payer: MEDICARE

## 2018-05-08 VITALS
HEART RATE: 93 BPM | BODY MASS INDEX: 27.62 KG/M2 | SYSTOLIC BLOOD PRESSURE: 122 MMHG | RESPIRATION RATE: 16 BRPM | OXYGEN SATURATION: 97 % | WEIGHT: 176 LBS | TEMPERATURE: 98 F | DIASTOLIC BLOOD PRESSURE: 79 MMHG | HEIGHT: 67 IN

## 2018-05-08 DIAGNOSIS — E78.2 MIXED DYSLIPIDEMIA: ICD-10-CM

## 2018-05-08 DIAGNOSIS — T46.6X5A STATIN MYOPATHY: ICD-10-CM

## 2018-05-08 DIAGNOSIS — R42 VERTIGO: ICD-10-CM

## 2018-05-08 DIAGNOSIS — I10 ESSENTIAL HYPERTENSION: Primary | ICD-10-CM

## 2018-05-08 DIAGNOSIS — Z12.31 ENCOUNTER FOR SCREENING MAMMOGRAM FOR BREAST CANCER: ICD-10-CM

## 2018-05-08 DIAGNOSIS — Z11.59 NEED FOR HEPATITIS C SCREENING TEST: ICD-10-CM

## 2018-05-08 DIAGNOSIS — B18.2 CHRONIC HEPATITIS C WITHOUT HEPATIC COMA: ICD-10-CM

## 2018-05-08 DIAGNOSIS — J30.1 NON-SEASONAL ALLERGIC RHINITIS DUE TO POLLEN: ICD-10-CM

## 2018-05-08 DIAGNOSIS — G72.0 STATIN MYOPATHY: ICD-10-CM

## 2018-05-08 DIAGNOSIS — J44.1 COPD WITH EXACERBATION: ICD-10-CM

## 2018-05-08 PROBLEM — Z72.0 TOBACCO USE: Status: ACTIVE | Noted: 2018-05-08

## 2018-05-08 PROCEDURE — 99204 OFFICE O/P NEW MOD 45 MIN: CPT | Mod: ,,, | Performed by: INTERNAL MEDICINE

## 2018-05-08 RX ORDER — FLUTICASONE PROPIONATE 50 MCG
2 SPRAY, SUSPENSION (ML) NASAL DAILY
Qty: 16 G | Refills: 5 | Status: SHIPPED | OUTPATIENT
Start: 2018-05-08 | End: 2018-11-06 | Stop reason: SDUPTHER

## 2018-05-08 RX ORDER — LISINOPRIL 20 MG/1
20 TABLET ORAL DAILY
Qty: 90 TABLET | Refills: 1 | Status: SHIPPED | OUTPATIENT
Start: 2018-05-08 | End: 2018-11-06 | Stop reason: SDUPTHER

## 2018-05-08 RX ORDER — ALBUTEROL SULFATE 90 UG/1
AEROSOL, METERED RESPIRATORY (INHALATION)
COMMUNITY
Start: 2017-10-03 | End: 2018-05-08 | Stop reason: SDUPTHER

## 2018-05-08 RX ORDER — MECLIZINE HYDROCHLORIDE 25 MG/1
25 TABLET ORAL 3 TIMES DAILY PRN
Qty: 30 TABLET | Refills: 1 | Status: SHIPPED | OUTPATIENT
Start: 2018-05-08 | End: 2018-11-06 | Stop reason: SDUPTHER

## 2018-05-08 RX ORDER — ALBUTEROL SULFATE 90 UG/1
2 AEROSOL, METERED RESPIRATORY (INHALATION) EVERY 6 HOURS PRN
Qty: 8 G | Refills: 5 | Status: SHIPPED | OUTPATIENT
Start: 2018-05-08 | End: 2018-11-06 | Stop reason: SDUPTHER

## 2018-05-08 NOTE — PATIENT INSTRUCTIONS
Treating Hepatitis C (HCV)    Its likely that hepatitis C virus (HCV) was found when routine liver tests were done on your blood, or after you donated blood. Once hepatitis C is found, a medical evaluation helps assess if you have liver disease. You may also have a small liver sample (biopsy) taken to see if medicines may help. Acute Hepatitis C often resolves without treatment. With new treatments, chronic (long-term) hepatitis C can be cured in most people.   Take these steps  To help keep your body strong and possibly ease symptoms:  · Avoid stressing your liver. Dont use alcohol or any unneeded medicines. This includes over-the-counter medicines such as acetaminophen. These can stress the liver. Always check with your healthcare provider first before taking any over-the-counter medicines or supplements.  · Eat a balanced diet. A diet low in fat, high in fiber, and full of fresh fruits and vegetables helps you stay healthy.  · Take prescribed medicines. Your provider will talk with you about the types of medicines that will work best for you. You may need to take medicines to treat hepatitis C for several months. Compared with past treatments, new medicines are very effective at curing the disease. They also have fewer side effects, and are taken for a shorter period of time.  Follow up regularly  Hepatitis C can get worse and hurt your liver without your knowing it. Stay in regular contact with your provider and healthcare team. They can watch your condition. They can tell you about any new research and types of treatment for hepatitis C.     Remember: No vaccine or medicine can prevent the spread of HCV and hepatitis C. Its up to you to keep others safe.  · Cover all skin breaks and sores yourself. If you need help, the person treating you should wear latex gloves.  · Use condoms during sex.  · Dont donate blood, plasma, other body tissue, or sperm.  · Dont share needles, razors, toothbrushes, manicure  tools, or other personal items.  · Hepatitis C can live on surfaces outside the body at room temperature for up to 4 days. Clean any blood spills using 1 part household bleach with 10 parts water. Wear gloves when cleaning.  · Talk with your healthcare provider about joining a support group.   Date Last Reviewed: 7/1/2016  © 5819-8656 [employer]. [address] [place] [address], [place] [address]. All rights reserved. This information is not intended as a substitute for professional medical care. Always follow your healthcare professional's instructions.        Understanding Hepatitis C (HCV)  Hepatitis means inflammation of the liver. There are many kinds of hepatitis. Some can be spread. Others are not. Hepatitis C virus (HCV) can be spread to others. It can lead to lifelong liver disease. This includes chronic hepatitis, cirrhosis, liver failure, and liver cancer.  Symptoms of hepatitis C  Most people notice no problems until they develop liver disease years later. Symptoms include the following:  · Flulike problems (fatigue, nausea, vomiting, diarrhea, and sore muscles and joints)  · Tenderness in the upper right abdomen  · Jaundice (yellowing skin)  · Swelling in the belly  · Itching  · Dark urine  How HCV spreads  HCV spreads through exposure to an infected persons blood. This is most likely to happen if:  · You used an infected needle (IV drug needles, tattoos, acupuncture needles, and body piercing)  · You had a needlestick injury in the hospital  · You shared personal care items such as razors  · You had sex without a condom with an infected person (a less common cause)  · You had a blood transfusion several years ago (blood is now screened for HCV)  Many people do not know how they were exposed to HCV.  Prevent the spread  No vaccine can prevent the spread of HCV and hepatitis C. Its up to you to keep others safe.  Do:  · Cover all skin breaks and sores yourself. If you need help, the person  treating you should wear latex gloves.  · Use condoms during sex.  Dont:  · Dont donate blood, plasma, body organs, other body tissue, or sperm.  · Dont share needles.  · Dont share razors, toothbrushes, manicure tools, or other personal items.   Date Last Reviewed: 7/1/2016 © 2000-2017 [employer]. [address] [place] [address], [place] [address]. All rights reserved. This information is not intended as a substitute for professional medical care. Always follow your healthcare professional's instructions.        Dizziness (Uncertain Cause)  Dizziness is a common symptom. It may be described as lightheadedness, spinning, or feeling like you are going to faint. Dizziness can have many causes.  Be sure to tell the healthcare provider about:  · All medicines you take, including prescription, over-the-counter, herbs, and supplements  · Any other symptoms you have  · Any health problems you are being treated for  · Anything that causes the dizziness to get worse or better  Today's exam did not show an exact cause for your dizziness. Other tests may be needed. Follow up with your healthcare provider.  Home care  · Dizziness that occurs with sudden standing may be a sign of mild dehydration. Drink extra fluids for the next few days.  · If you recently started a new medicine, stopped a medicine, or had the dose of a current medicine changed, talk with the prescribing healthcare provider. Your medicine plan may need adjustment.  · If dizziness lasts more than a few seconds, sit or lie down until it passes. This may help prevent injury in case you pass out.  · Do not drive or use power tools or dangerous equipment until you have had no dizziness for at least 48 hours.  Follow-up care  Follow up with your healthcare provider for further evaluation within the next 7 days or as advised.  When to seek medical advice  Call your healthcare provider for any of the following:  · Worsening of symptoms or new  symptoms  · Passing out or seizure  · Repeated vomiting  · Headache  · Palpitations (the sense that your heart is fluttering or beating fast or hard)  · Shortness of breath  · Blood in vomit or stool (black or red color)  · Weakness of an arm or leg or one side of the face  · Vision or hearing changes  · Trouble walking or speaking  · Chest, arm, neck, back, or jaw pain  Date Last Reviewed: 8/23/2015  © 4203-9202 [employer] [address] [place] [address]. All rights reserved. This information is not intended as a substitute for professional medical care. Always follow your healthcare professional's instructions.

## 2018-05-08 NOTE — PROGRESS NOTES
Subjective:       Patient ID: Luz Marina Moses is a 63 y.o. female.    Chief Complaint: Results (abnormal hep c ); Sinus Problem; Hypertension; Hepatitis C; and Nicotine Dependence    Ms. Luz Marina Moses is a 63-year-old  female was moved from [place] to [facility] and seeks me as a primary care physician.    Her underlying and established medical issues include hypertension for several years, hyperlipidemia (intolerance to statins), chronic back pain and fractures in past with established opiate dependence and opiate treatment. She follows up with Dr. Deon Rucker M.D. chronic pain management at [place].    She also has sinus allergies and uses inhaler for what she describes as lung allergies    He also reports on and off episodes of dizziness. She takes meclizine for the same. In past she would take Valium also for dizziness but this has been stopped thereafter. (I'm not sure if she was mixing Valium with opiate medications).    She also reports on and off nausea which she attributes to dizziness/vertigo and takes Zofran regularly. She mixes Zofran with Antivert. She reports constipation. She has pelvic like stools intermittently. She is not aware that opiate medications can cause opiate gut syndrome associated with nausea and constipation. She does not believe in that possibility.      Hypertension   This is a chronic problem. The current episode started more than 1 year ago. The problem is controlled. Associated symptoms include malaise/fatigue. Pertinent negatives include no chest pain, headaches, palpitations, peripheral edema or shortness of breath. Risk factors for coronary artery disease include sedentary lifestyle. Past treatments include ACE inhibitors and diuretics. The current treatment provides moderate improvement. There is no history of pheochromocytoma.   Sinus Problem   This is a chronic problem. The current episode started more than 1 year ago. The problem has been waxing and waning  since onset. There has been no fever. Pertinent negatives include no chills, congestion, coughing, headaches, shortness of breath or sinus pressure. Treatments tried: Fluticasone nasal spray.   Nicotine Dependence   Symptoms are negative for fatigue. Her urge triggers include company of smokers.   Hyperlipidemia   This is a chronic problem. The current episode started more than 1 year ago. She has no history of obesity. Associated symptoms include myalgias. Pertinent negatives include no chest pain or shortness of breath. Current antihyperlipidemic treatment includes statins. The current treatment provides moderate improvement of lipids. Compliance problems include psychosocial issues.  Risk factors for coronary artery disease include a sedentary lifestyle and hypertension.   Dizziness:   Chronicity:  Recurrent  Onset:  More than 1 year ago  Progression since onset:  Waxing and waning  Dizziness characteristics:  Motion-sickness and sensation of movement   Associated symptoms: nausea.no fever, no headaches, no weakness, no light-headedness, no syncope, no palpitations, no facial weakness, no slurred speech, no numbness in extremities and no chest pain.no head trauma, no head trauma and no environmental allergies.  Nausea   This is a chronic problem. The current episode started more than 1 year ago. The problem has been waxing and waning. Associated symptoms include myalgias and nausea. Pertinent negatives include no arthralgias, chest pain, chills, congestion, coughing, fatigue, fever, headaches, joint swelling, rash or weakness. Treatments tried: Zofran. The treatment provided moderate relief.       Past Medical History:   Diagnosis Date    Allergy     Anemia     Anxiety     Arthritis     COPD (chronic obstructive pulmonary disease)     Depression     Encounter for blood transfusion     Fibromyalgia     currently on disability for fibromyalgia.     H/O fibromyalgia     Heart murmur     Hepatitis C  2017    Hyperlipidemia     Hypertension     IBS (irritable bowel syndrome)     Osteoarthritis     Osteoporosis     PTSD (post-traumatic stress disorder)      Social History     Social History    Marital status:      Spouse name: N/A    Number of children: 2    Years of education: N/A     Occupational History    Disabled-       PTSD, Archana     Social History Main Topics    Smoking status: Current Every Day Smoker     Packs/day: 1.50     Years: 40.00     Start date: 1971    Smokeless tobacco: Never Used    Alcohol use No      Comment: denies use    Drug use: No    Sexual activity: No     Other Topics Concern    Not on file     Social History Narrative    Currently on disability for fibromyalgia.         1 step son and 1 son.     Past Surgical History:   Procedure Laterality Date     SECTION      FRACTURE SURGERY      HYSTERECTOMY      partial     LEG SURGERY Left 16    orif    TONSILLECTOMY       Family History   Problem Relation Age of Onset    Arthritis Mother     Kidney disease Mother     Depression Mother     Diabetes Mother     Early death Mother     Hypertension Mother     Mental illness Mother     Miscarriages / Stillbirths Mother     Stroke Sister     Stroke Brother     Hyperlipidemia Brother     Stroke Sister     Alcohol abuse Brother     Liver disease Brother     Thrombophlebitis Other     Cancer Sister 45        breast     Arthritis Sister     Heart disease Maternal Grandfather        Review of Systems   Constitutional: Positive for malaise/fatigue. Negative for activity change, chills, fatigue, fever and unexpected weight change.   HENT: Negative for congestion, postnasal drip and sinus pressure.    Eyes: Negative for pain, discharge and visual disturbance.   Respiratory: Negative for cough, chest tightness and shortness of breath.    Cardiovascular: Negative for chest pain, palpitations, leg swelling and syncope.   Gastrointestinal:  "Positive for constipation and nausea. Negative for abdominal distention, anal bleeding and diarrhea.   Genitourinary: Negative for difficulty urinating, dysuria, flank pain, frequency, menstrual problem, pelvic pain, vaginal bleeding and vaginal pain.   Musculoskeletal: Positive for back pain and myalgias. Negative for arthralgias and joint swelling.        Chronic pain syndrome   Skin: Negative for color change, pallor and rash.   Allergic/Immunologic: Negative for environmental allergies, food allergies and immunocompromised state.   Neurological: Positive for dizziness. Negative for tremors, seizures, syncope, weakness, light-headedness and headaches.   Hematological: Negative for adenopathy. Does not bruise/bleed easily.   Psychiatric/Behavioral: Negative for agitation, confusion and dysphoric mood. The patient is not nervous/anxious.         Chronic pain syndrome       Objective:       Vitals:    05/08/18 0911   BP: 122/79   Pulse: 93   Resp: 16   Temp: 98 °F (36.7 °C)   SpO2: 97%   Weight: 79.8 kg (176 lb)   Height: 5' 7" (1.702 m)   PF: 260 L/min     Physical Exam   Constitutional: She is oriented to person, place, and time. She appears well-developed and well-nourished. She is cooperative. No distress.   HENT:   Head: Normocephalic and atraumatic.   Right Ear: Tympanic membrane normal.   Left Ear: Tympanic membrane normal.   Eyes: Conjunctivae, EOM and lids are normal. Pupils are equal, round, and reactive to light. Lids are everted and swept, no foreign bodies found. Right pupil is round and reactive. Left pupil is round and reactive.   Neck: Trachea normal and normal range of motion. Neck supple.   Cardiovascular: Normal rate, regular rhythm, S1 normal, S2 normal, normal heart sounds and intact distal pulses.    Pulmonary/Chest: She has decreased breath sounds in the right middle field, the right lower field, the left middle field and the left lower field. She has no wheezes. She has rhonchi.   Abdominal: " Soft. Bowel sounds are normal. There is no rigidity and no guarding.   Musculoskeletal: Normal range of motion.   Lymphadenopathy:     She has no cervical adenopathy.     She has no axillary adenopathy.   Neurological: She is alert and oriented to person, place, and time.   Skin: Skin is warm and dry. Capillary refill takes less than 2 seconds.   Psychiatric: She has a normal mood and affect. Her behavior is normal. Judgment and thought content normal.   Nursing note and vitals reviewed.      Assessment:       1. Essential hypertension    2. Mixed dyslipidemia    3. Statin myopathy    4. Chronic hepatitis C without hepatic coma    5. COPD with exacerbation    6. Need for hepatitis C screening test    7. Encounter for screening mammogram for breast cancer    8. Non-seasonal allergic rhinitis due to pollen    9. Vertigo         Plan:           Essential hypertension  -     lisinopril (PRINIVIL,ZESTRIL) 20 MG tablet; Take 1 tablet (20 mg total) by mouth once daily.  Dispense: 90 tablet; Refill: 1  -     Comprehensive metabolic panel; Future; Expected date: 05/08/2018  -     Microalbumin/creatinine urine ratio; Future    Mixed dyslipidemia    Statin myopathy    Chronic hepatitis C without hepatic coma  -     Ambulatory referral to Gastroenterology    COPD with exacerbation  -     albuterol (PROAIR HFA) 90 mcg/actuation inhaler; Inhale 2 puffs into the lungs every 6 (six) hours as needed for Wheezing.  Dispense: 8 g; Refill: 5    Need for hepatitis C screening test    Encounter for screening mammogram for breast cancer  -     Mammo Digital Screening Bilat with CAD; Future; Expected date: 05/08/2018    Non-seasonal allergic rhinitis due to pollen  -     fluticasone (FLONASE) 50 mcg/actuation nasal spray; 2 sprays (100 mcg total) by Each Nare route once daily.  Dispense: 16 g; Refill: 5    Vertigo  -     meclizine (ANTIVERT) 25 mg tablet; Take 1 tablet (25 mg total) by mouth 3 (three) times daily as needed.  Dispense: 30  "tablet; Refill: 1    I don't aware to begin with this patient who has multiple medical issues with a brand-new diagnosis of hepatitis C.    Patient has a lot of assumptions and presumptions about her medical conditions and is somewhat paranoid about new recommendations and possible changes.    At this point the biggest new problem seems to be a diagnosis of hepatitis C with extremely elevated Viral count. This will need to be addressed and will make a referral appropriately to the GI services.    She has a chronic cough with some rhonchi and has been a chronic smoker. She had a tough time accepting the fact that chronic smoking does and in fact can lead to bronchitis and emphysema and this might be the beginning of COPD. Thus far she attributed this to sinus and "lung allergies". She has been strongly advised to quit smoking but given her contemporaneous and concomitant multiple other issues I'm not sure how successful will she be in her attempts.    Patient's history of using Zofran multiple times a day for nausea has been noted. Again she had difficulty accepting that her nausea and vomiting like sensation might be attributed to opiate use rather than episodes of vertigo and dizziness which she likes to link it with.    I sympathize and empathize with her chronic pain/PTSD and she should continue to follow up with chronic pain management with appropriate safeguards. I've advised her that I will not be able to monitor her or make a recommendation for chronic opiate therapy given her complex nature of problems.    I did review her  and it seems that she has regular prescription of opiate medications from one source only.    Patient examined in presence of Ms. Eubankse as chaperone and follow-up will be given in couple of months.  "

## 2018-05-09 DIAGNOSIS — I10 ESSENTIAL HYPERTENSION: ICD-10-CM

## 2018-05-09 RX ORDER — TRIAMTERENE/HYDROCHLOROTHIAZID 37.5-25 MG
1 TABLET ORAL DAILY
Qty: 90 TABLET | Refills: 1 | Status: SHIPPED | OUTPATIENT
Start: 2018-05-09 | End: 2018-11-06 | Stop reason: SDUPTHER

## 2018-06-04 ENCOUNTER — TELEPHONE (OUTPATIENT)
Dept: FAMILY MEDICINE | Facility: CLINIC | Age: 63
End: 2018-06-04

## 2018-06-04 NOTE — TELEPHONE ENCOUNTER
Pt informed that she should wait until she sees Dr Palma to see what labs she would like to do  Pt verbalized understanding  Pt states she has a pain management doctor that prescribes her opiates.  Pt states she needs to see someone to follow up on all her other health conditions.

## 2018-06-04 NOTE — TELEPHONE ENCOUNTER
----- Message from RT Alf sent at 6/4/2018 11:32 AM CDT -----  Contact: pt    pt , requesting lab test orders in and scheduled prior to her upcoming appt of 07/12/2018, please call to confirm, thanks.

## 2018-06-04 NOTE — TELEPHONE ENCOUNTER
I have reviewed her chart and given her numerous medical conditions I would like to wait until I see her to know what all will need to be ordered. Can you also ask what she is coming for? She saw a family medicine doctor last month to establish care. Let her know that if this is the case, I would be happy so see her to establish care for her medical conditions, but that I do not prescribe opiates for chronic pain either.

## 2018-06-14 ENCOUNTER — TELEPHONE (OUTPATIENT)
Dept: SMOKING CESSATION | Facility: CLINIC | Age: 63
End: 2018-06-14

## 2018-06-27 ENCOUNTER — CLINICAL SUPPORT (OUTPATIENT)
Dept: SMOKING CESSATION | Facility: CLINIC | Age: 63
End: 2018-06-27
Payer: COMMERCIAL

## 2018-06-27 DIAGNOSIS — F17.200 NICOTINE DEPENDENCE: Primary | ICD-10-CM

## 2018-06-27 PROCEDURE — 99406 BEHAV CHNG SMOKING 3-10 MIN: CPT | Mod: S$GLB,,, | Performed by: INTERNAL MEDICINE

## 2018-06-27 NOTE — PROGRESS NOTES
Spoke with patient today in regard to smoking cessation progress for 3/6 month follow up, she states not tobacco free at this time. Patient states she was driving and would return a call at a later date to schedule an appointment, she also mention wanting to give me a call back from my previous message. Informed patient of benefit period and contact information if any further help or support is needed. Will complete smart form for 3/6 month follow up on Quit attempt #2.

## 2018-11-02 ENCOUNTER — TELEPHONE (OUTPATIENT)
Dept: SMOKING CESSATION | Facility: CLINIC | Age: 63
End: 2018-11-02

## 2018-11-06 ENCOUNTER — TELEPHONE (OUTPATIENT)
Dept: FAMILY MEDICINE | Facility: CLINIC | Age: 63
End: 2018-11-06

## 2018-11-06 ENCOUNTER — OFFICE VISIT (OUTPATIENT)
Dept: FAMILY MEDICINE | Facility: CLINIC | Age: 63
End: 2018-11-06
Payer: MEDICARE

## 2018-11-06 VITALS
OXYGEN SATURATION: 98 % | BODY MASS INDEX: 27.18 KG/M2 | HEART RATE: 103 BPM | SYSTOLIC BLOOD PRESSURE: 130 MMHG | DIASTOLIC BLOOD PRESSURE: 80 MMHG | TEMPERATURE: 98 F | HEIGHT: 67 IN | RESPIRATION RATE: 16 BRPM | WEIGHT: 173.19 LBS

## 2018-11-06 DIAGNOSIS — Z12.11 COLON CANCER SCREENING: ICD-10-CM

## 2018-11-06 DIAGNOSIS — M81.0 OSTEOPOROSIS WITHOUT CURRENT PATHOLOGICAL FRACTURE, UNSPECIFIED OSTEOPOROSIS TYPE: ICD-10-CM

## 2018-11-06 DIAGNOSIS — R42 VERTIGO: ICD-10-CM

## 2018-11-06 DIAGNOSIS — K21.9 GASTROESOPHAGEAL REFLUX DISEASE, ESOPHAGITIS PRESENCE NOT SPECIFIED: ICD-10-CM

## 2018-11-06 DIAGNOSIS — R11.0 NAUSEA: ICD-10-CM

## 2018-11-06 DIAGNOSIS — F43.10 PTSD (POST-TRAUMATIC STRESS DISORDER): ICD-10-CM

## 2018-11-06 DIAGNOSIS — J30.1 NON-SEASONAL ALLERGIC RHINITIS DUE TO POLLEN: ICD-10-CM

## 2018-11-06 DIAGNOSIS — B18.2 CHRONIC HEPATITIS C WITHOUT HEPATIC COMA: Primary | ICD-10-CM

## 2018-11-06 DIAGNOSIS — I10 ESSENTIAL HYPERTENSION: ICD-10-CM

## 2018-11-06 DIAGNOSIS — J42 CHRONIC BRONCHITIS, UNSPECIFIED CHRONIC BRONCHITIS TYPE: ICD-10-CM

## 2018-11-06 PROCEDURE — 99214 OFFICE O/P EST MOD 30 MIN: CPT | Mod: ,,, | Performed by: FAMILY MEDICINE

## 2018-11-06 RX ORDER — NALOXONE HYDROCHLORIDE 4 MG/.1ML
SPRAY NASAL
COMMUNITY
Start: 2018-10-10

## 2018-11-06 RX ORDER — ALBUTEROL SULFATE 90 UG/1
2 AEROSOL, METERED RESPIRATORY (INHALATION) EVERY 6 HOURS PRN
Qty: 8 G | Refills: 5 | Status: SHIPPED | OUTPATIENT
Start: 2018-11-06 | End: 2019-05-07

## 2018-11-06 RX ORDER — HYDROGEN PEROXIDE 3 %
20 SOLUTION, NON-ORAL MISCELLANEOUS
Qty: 30 CAPSULE | Refills: 11 | Status: SHIPPED | OUTPATIENT
Start: 2018-11-06 | End: 2019-05-07

## 2018-11-06 RX ORDER — FLUTICASONE PROPIONATE 50 MCG
2 SPRAY, SUSPENSION (ML) NASAL DAILY
Qty: 16 G | Refills: 5 | Status: SHIPPED | OUTPATIENT
Start: 2018-11-06 | End: 2019-05-07 | Stop reason: SDUPTHER

## 2018-11-06 RX ORDER — MECLIZINE HYDROCHLORIDE 25 MG/1
25 TABLET ORAL 3 TIMES DAILY PRN
Qty: 30 TABLET | Refills: 1 | Status: SHIPPED | OUTPATIENT
Start: 2018-11-06 | End: 2019-05-07 | Stop reason: SDUPTHER

## 2018-11-06 RX ORDER — TRIAMTERENE/HYDROCHLOROTHIAZID 37.5-25 MG
1 TABLET ORAL DAILY
Qty: 90 TABLET | Refills: 1 | Status: SHIPPED | OUTPATIENT
Start: 2018-11-06 | End: 2019-05-07 | Stop reason: SDUPTHER

## 2018-11-06 RX ORDER — ALENDRONATE SODIUM 70 MG/1
70 TABLET ORAL
Qty: 4 TABLET | Refills: 5 | Status: SHIPPED | OUTPATIENT
Start: 2018-11-06 | End: 2020-01-28 | Stop reason: SDUPTHER

## 2018-11-06 RX ORDER — ONDANSETRON HYDROCHLORIDE 8 MG/1
TABLET, FILM COATED ORAL
Qty: 30 TABLET | Refills: 0 | Status: SHIPPED | OUTPATIENT
Start: 2018-11-06 | End: 2019-02-11 | Stop reason: SDUPTHER

## 2018-11-06 RX ORDER — ESCITALOPRAM OXALATE 10 MG/1
10 TABLET ORAL DAILY
COMMUNITY
Start: 2018-10-04 | End: 2020-01-24

## 2018-11-06 RX ORDER — LISINOPRIL 20 MG/1
20 TABLET ORAL DAILY
Qty: 90 TABLET | Refills: 1 | Status: SHIPPED | OUTPATIENT
Start: 2018-11-06 | End: 2019-05-07 | Stop reason: SDUPTHER

## 2018-11-06 NOTE — TELEPHONE ENCOUNTER
----- Message from Julee Mcgarry sent at 11/6/2018 10:30 AM CST -----  PRIOR AUTHORIZATION, 11/06/18

## 2018-11-06 NOTE — TELEPHONE ENCOUNTER
The following medication needs a prior authorization:     Medication Name: esomeprazole    Dosage: 20 mg    Frequency: daily    Directions for use: once daily    Diagnosis: GERD    Is the request for a reauthorization? No      Is the patient currently stable on therapy? New therapy    Please list all therapeutic alternatives previously used with start/end dates and outcome:    Zantac otc  prilosec otc  tums

## 2018-11-06 NOTE — TELEPHONE ENCOUNTER
Pharmacy faxed over documentation stating that esomeprazole is not covered by the patient's insurance. The preferred medication are as follows: Nexium, Pantoprazole, or Omeprazole. Is it possible to change the medication to one of those? Please advise    Thank you!!
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
## 2018-11-12 ENCOUNTER — TELEPHONE (OUTPATIENT)
Dept: FAMILY MEDICINE | Facility: CLINIC | Age: 63
End: 2018-11-12

## 2018-11-12 NOTE — TELEPHONE ENCOUNTER
Patient called to state that she came down with bronchitis. She is running a fever,throat is scratchy and chest hurts from the coughing. She wants to know if a cough syrup and antibiotic can be called into her local pharmacy?  Is that possible?  Please advise

## 2018-11-14 ENCOUNTER — TELEPHONE (OUTPATIENT)
Dept: SMOKING CESSATION | Facility: CLINIC | Age: 63
End: 2018-11-14

## 2018-11-20 ENCOUNTER — TELEPHONE (OUTPATIENT)
Dept: SMOKING CESSATION | Facility: CLINIC | Age: 63
End: 2018-11-20

## 2018-12-20 ENCOUNTER — OFFICE VISIT (OUTPATIENT)
Dept: PSYCHIATRY | Facility: CLINIC | Age: 63
End: 2018-12-20
Payer: MEDICARE

## 2018-12-20 DIAGNOSIS — F43.10 PTSD (POST-TRAUMATIC STRESS DISORDER): Primary | ICD-10-CM

## 2018-12-20 PROCEDURE — 90791 PSYCH DIAGNOSTIC EVALUATION: CPT | Mod: PBBFAC,PN | Performed by: SOCIAL WORKER

## 2018-12-20 PROCEDURE — 99999 PR PBB SHADOW E&M-EST. PATIENT-LVL II: CPT | Mod: PBBFAC,,, | Performed by: SOCIAL WORKER

## 2018-12-20 PROCEDURE — 99212 OFFICE O/P EST SF 10 MIN: CPT | Mod: PBBFAC,PN | Performed by: SOCIAL WORKER

## 2018-12-20 PROCEDURE — 90791 PSYCH DIAGNOSTIC EVALUATION: CPT | Mod: S$PBB,,, | Performed by: SOCIAL WORKER
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
## 2019-02-11 DIAGNOSIS — R11.0 NAUSEA: ICD-10-CM

## 2019-02-11 RX ORDER — ONDANSETRON HYDROCHLORIDE 8 MG/1
TABLET, FILM COATED ORAL
Qty: 30 TABLET | Refills: 0 | Status: SHIPPED | OUTPATIENT
Start: 2019-02-11 | End: 2019-05-07 | Stop reason: SDUPTHER

## 2019-02-22 ENCOUNTER — TELEPHONE (OUTPATIENT)
Dept: FAMILY MEDICINE | Facility: CLINIC | Age: 64
End: 2019-02-22

## 2019-02-22 RX ORDER — NYSTATIN 100000 [USP'U]/ML
4 SUSPENSION ORAL 4 TIMES DAILY
Qty: 160 ML | Refills: 0 | Status: SHIPPED | OUTPATIENT
Start: 2019-02-22 | End: 2019-03-04 | Stop reason: SDUPTHER

## 2019-02-22 NOTE — TELEPHONE ENCOUNTER
Patient called to state that she has thrush. She stated that she went to the pharmacy to see if nystatin (?) was over the counter. The pharmacist recommended to her to contact us to get an Rx for the thrush in her mouth. Is it at all possible to call in something for her so she doesn't have to go through the weekend with this issue?

## 2019-02-22 NOTE — TELEPHONE ENCOUNTER
Please call this patient, all products that are used for thrush contain RED DYE, and according to the computer she has a severe RED DYE allergy  please call her and have her discuss this with the pharmacist.    Home remedies for thrush:  1) Mix 2 teaspoons of raw, unfiltered apple cider vinegar and ½ teaspoon of salt in a cup of warm water. Use this solution to rinse your mouth several times a day until you notice improvement.  2) Mix ½ to 1 teaspoon of salt in 1 cup of warm water. Use it to gargle thoroughly, then spit it out. Repeat a few times daily until you are satisfied with the results.  3) Mix 1 to 2 teaspoons of baking soda with enough water to form a paste. Use a cotton ball to apply this paste on the tongue and inner cheek area. Wait for a few minutes, then rinse your mouth with warm water. Follow this remedy 2 or 3 times a day for a few days.  4) Yogert: Eat 2 to 3 cups of yogurt daily for a few weeks. Also, rub some yogurt on your tongue and inside your mouth with your finger. Allow it to sit for 5 to 10 minutes, then rinse your mouth thoroughly with warm water. Do this once or twice a day for a few days.

## 2019-02-22 NOTE — TELEPHONE ENCOUNTER
I called patient about red dye in nystatin and she stated she still wanted it because she has taken pink meds before without problems. I instructed her to talk to pharm again about red dye.

## 2019-02-22 NOTE — TELEPHONE ENCOUNTER
Pt was contacted was given the option of several home remedies because she has an allergy to RED DYE, Pt responded that she would prefer the medication despite the red dye allergy I have asked her to discuss this with the pharmacist.

## 2019-02-26 ENCOUNTER — TELEPHONE (OUTPATIENT)
Dept: FAMILY MEDICINE | Facility: CLINIC | Age: 64
End: 2019-02-26

## 2019-02-26 NOTE — TELEPHONE ENCOUNTER
Patient called to state that she is still suffering from thrush. She states that she has taken nystatin in the past without any issues. She WANTS this medication called in because she is suffering from this issue. Is it possible to call in the medication to the patient's local pharmacy so she can get some relief?    Thank you!!!

## 2019-02-27 NOTE — TELEPHONE ENCOUNTER
Have either one of you opened her chart or called the pharmacy because I sent the prescription 4 days ago. Please have the patient call the pharmacy.    When I look at the medication list it appears that it was prescribed on 22 Feb 2019.

## 2019-03-04 RX ORDER — NYSTATIN 100000 [USP'U]/ML
SUSPENSION ORAL
Qty: 100 ML | Refills: 0 | Status: SHIPPED | OUTPATIENT
Start: 2019-03-04 | End: 2019-09-17 | Stop reason: ALTCHOICE

## 2019-04-09 ENCOUNTER — TELEPHONE (OUTPATIENT)
Dept: FAMILY MEDICINE | Facility: CLINIC | Age: 64
End: 2019-04-09

## 2019-04-09 DIAGNOSIS — E78.2 MIXED DYSLIPIDEMIA: ICD-10-CM

## 2019-04-09 DIAGNOSIS — I10 ESSENTIAL HYPERTENSION: Primary | ICD-10-CM

## 2019-05-03 ENCOUNTER — LAB VISIT (OUTPATIENT)
Dept: LAB | Facility: HOSPITAL | Age: 64
End: 2019-05-03
Attending: FAMILY MEDICINE
Payer: MEDICARE

## 2019-05-03 ENCOUNTER — TELEPHONE (OUTPATIENT)
Dept: FAMILY MEDICINE | Facility: CLINIC | Age: 64
End: 2019-05-03

## 2019-05-03 DIAGNOSIS — I10 ESSENTIAL HYPERTENSION: Primary | ICD-10-CM

## 2019-05-03 DIAGNOSIS — E78.2 MIXED DYSLIPIDEMIA: Primary | ICD-10-CM

## 2019-05-03 LAB
ANION GAP SERPL CALC-SCNC: 12 MMOL/L (ref 8–16)
BASOPHILS # BLD AUTO: 0.02 K/UL (ref 0–0.2)
BASOPHILS NFR BLD: 0.2 % (ref 0–1.9)
BUN SERPL-MCNC: 14 MG/DL (ref 8–23)
CALCIUM SERPL-MCNC: 9.8 MG/DL (ref 8.7–10.5)
CHLORIDE SERPL-SCNC: 98 MMOL/L (ref 95–110)
CHOLEST SERPL-MCNC: 239 MG/DL (ref 120–199)
CHOLEST/HDLC SERPL: 6 {RATIO} (ref 2–5)
CO2 SERPL-SCNC: 26 MMOL/L (ref 23–29)
CREAT SERPL-MCNC: 0.8 MG/DL (ref 0.5–1.4)
DIFFERENTIAL METHOD: ABNORMAL
EOSINOPHIL # BLD AUTO: 0.1 K/UL (ref 0–0.5)
EOSINOPHIL NFR BLD: 0.6 % (ref 0–8)
ERYTHROCYTE [DISTWIDTH] IN BLOOD BY AUTOMATED COUNT: 13 % (ref 11.5–14.5)
EST. GFR  (AFRICAN AMERICAN): >60 ML/MIN/1.73 M^2
EST. GFR  (NON AFRICAN AMERICAN): >60 ML/MIN/1.73 M^2
GLUCOSE SERPL-MCNC: 89 MG/DL (ref 70–110)
HCT VFR BLD AUTO: 35.3 % (ref 37–48.5)
HDLC SERPL-MCNC: 40 MG/DL (ref 40–75)
HDLC SERPL: 16.7 % (ref 20–50)
HGB BLD-MCNC: 11.6 G/DL (ref 12–16)
IMM GRANULOCYTES # BLD AUTO: 0.02 K/UL (ref 0–0.04)
IMM GRANULOCYTES NFR BLD AUTO: 0.2 % (ref 0–0.5)
LDLC SERPL CALC-MCNC: 174.6 MG/DL (ref 63–159)
LYMPHOCYTES # BLD AUTO: 2.8 K/UL (ref 1–4.8)
LYMPHOCYTES NFR BLD: 31.6 % (ref 18–48)
MCH RBC QN AUTO: 32.5 PG (ref 27–31)
MCHC RBC AUTO-ENTMCNC: 32.9 G/DL (ref 32–36)
MCV RBC AUTO: 99 FL (ref 82–98)
MONOCYTES # BLD AUTO: 0.8 K/UL (ref 0.3–1)
MONOCYTES NFR BLD: 8.6 % (ref 4–15)
NEUTROPHILS # BLD AUTO: 5.3 K/UL (ref 1.8–7.7)
NEUTROPHILS NFR BLD: 58.8 % (ref 38–73)
NONHDLC SERPL-MCNC: 199 MG/DL
NRBC BLD-RTO: 0 /100 WBC
PLATELET # BLD AUTO: 287 K/UL (ref 150–350)
PMV BLD AUTO: 9.8 FL (ref 9.2–12.9)
POTASSIUM SERPL-SCNC: 4 MMOL/L (ref 3.5–5.1)
RBC # BLD AUTO: 3.57 M/UL (ref 4–5.4)
SODIUM SERPL-SCNC: 136 MMOL/L (ref 136–145)
TRIGL SERPL-MCNC: 122 MG/DL (ref 30–150)
WBC # BLD AUTO: 9 K/UL (ref 3.9–12.7)

## 2019-05-03 PROCEDURE — 80061 LIPID PANEL: CPT

## 2019-05-03 PROCEDURE — 80048 BASIC METABOLIC PNL TOTAL CA: CPT

## 2019-05-03 PROCEDURE — 85025 COMPLETE CBC W/AUTO DIFF WBC: CPT

## 2019-05-03 PROCEDURE — 36415 COLL VENOUS BLD VENIPUNCTURE: CPT

## 2019-05-07 ENCOUNTER — OFFICE VISIT (OUTPATIENT)
Dept: FAMILY MEDICINE | Facility: CLINIC | Age: 64
End: 2019-05-07
Payer: MEDICARE

## 2019-05-07 VITALS
RESPIRATION RATE: 20 BRPM | HEART RATE: 96 BPM | HEIGHT: 67 IN | WEIGHT: 179.5 LBS | BODY MASS INDEX: 28.17 KG/M2 | SYSTOLIC BLOOD PRESSURE: 130 MMHG | DIASTOLIC BLOOD PRESSURE: 82 MMHG | OXYGEN SATURATION: 97 % | TEMPERATURE: 98 F

## 2019-05-07 DIAGNOSIS — R11.0 NAUSEA: ICD-10-CM

## 2019-05-07 DIAGNOSIS — J41.0 SIMPLE CHRONIC BRONCHITIS: Primary | ICD-10-CM

## 2019-05-07 DIAGNOSIS — I10 ESSENTIAL HYPERTENSION: ICD-10-CM

## 2019-05-07 DIAGNOSIS — K13.79 ORAL MASS: ICD-10-CM

## 2019-05-07 DIAGNOSIS — Z12.39 BREAST CANCER SCREENING: ICD-10-CM

## 2019-05-07 DIAGNOSIS — E78.49 OTHER HYPERLIPIDEMIA: ICD-10-CM

## 2019-05-07 DIAGNOSIS — Z12.9 SCREENING FOR CANCER: ICD-10-CM

## 2019-05-07 DIAGNOSIS — J30.1 NON-SEASONAL ALLERGIC RHINITIS DUE TO POLLEN: ICD-10-CM

## 2019-05-07 DIAGNOSIS — Z87.891 PERSONAL HISTORY OF NICOTINE DEPENDENCE: ICD-10-CM

## 2019-05-07 DIAGNOSIS — B18.2 CHRONIC HEPATITIS C WITHOUT HEPATIC COMA: ICD-10-CM

## 2019-05-07 DIAGNOSIS — M25.50 ARTHRALGIA, UNSPECIFIED JOINT: ICD-10-CM

## 2019-05-07 DIAGNOSIS — R42 VERTIGO: ICD-10-CM

## 2019-05-07 PROCEDURE — 99213 PR OFFICE/OUTPT VISIT, EST, LEVL III, 20-29 MIN: ICD-10-PCS | Mod: ,,, | Performed by: FAMILY MEDICINE

## 2019-05-07 PROCEDURE — 99213 OFFICE O/P EST LOW 20 MIN: CPT | Mod: ,,, | Performed by: FAMILY MEDICINE

## 2019-05-07 RX ORDER — PANTOPRAZOLE SODIUM 40 MG/1
40 TABLET, DELAYED RELEASE ORAL DAILY
Qty: 30 TABLET | Refills: 11 | Status: SHIPPED | OUTPATIENT
Start: 2019-05-07 | End: 2019-09-24

## 2019-05-07 RX ORDER — ALBUTEROL SULFATE 90 UG/1
2 AEROSOL, METERED RESPIRATORY (INHALATION) EVERY 6 HOURS PRN
Qty: 18 G | Refills: 0 | Status: SHIPPED | OUTPATIENT
Start: 2019-05-07 | End: 2020-01-06 | Stop reason: SDUPTHER

## 2019-05-07 RX ORDER — FLUTICASONE PROPIONATE 50 MCG
2 SPRAY, SUSPENSION (ML) NASAL DAILY
Qty: 16 G | Refills: 5 | Status: SHIPPED | OUTPATIENT
Start: 2019-05-07 | End: 2020-05-11 | Stop reason: SDUPTHER

## 2019-05-07 RX ORDER — EZETIMIBE 10 MG/1
10 TABLET ORAL DAILY
Qty: 90 TABLET | Refills: 3 | Status: SHIPPED | OUTPATIENT
Start: 2019-05-07 | End: 2020-01-28 | Stop reason: SDUPTHER

## 2019-05-07 RX ORDER — TRAMADOL HYDROCHLORIDE 50 MG/1
50 TABLET ORAL 2 TIMES DAILY
Refills: 0 | COMMUNITY
Start: 2019-04-03 | End: 2020-01-24

## 2019-05-07 RX ORDER — MECLIZINE HYDROCHLORIDE 25 MG/1
25 TABLET ORAL 3 TIMES DAILY PRN
Qty: 30 TABLET | Refills: 1 | Status: SHIPPED | OUTPATIENT
Start: 2019-05-07 | End: 2019-08-21 | Stop reason: SDUPTHER

## 2019-05-07 RX ORDER — LISINOPRIL 20 MG/1
20 TABLET ORAL DAILY
Qty: 90 TABLET | Refills: 1 | Status: SHIPPED | OUTPATIENT
Start: 2019-05-07 | End: 2019-11-11 | Stop reason: SDUPTHER

## 2019-05-07 RX ORDER — TIOTROPIUM BROMIDE 18 UG/1
18 CAPSULE ORAL; RESPIRATORY (INHALATION) DAILY
Qty: 1 BOX | Refills: 0 | Status: SHIPPED | OUTPATIENT
Start: 2019-05-07 | End: 2020-01-24

## 2019-05-07 RX ORDER — TRIAMTERENE/HYDROCHLOROTHIAZID 37.5-25 MG
1 TABLET ORAL DAILY
Qty: 90 TABLET | Refills: 1 | Status: SHIPPED | OUTPATIENT
Start: 2019-05-07 | End: 2019-11-11 | Stop reason: SDUPTHER

## 2019-05-07 RX ORDER — ONDANSETRON HYDROCHLORIDE 8 MG/1
TABLET, FILM COATED ORAL
Qty: 30 TABLET | Refills: 0 | Status: SHIPPED | OUTPATIENT
Start: 2019-05-07 | End: 2019-08-21 | Stop reason: SDUPTHER

## 2019-05-07 NOTE — PROGRESS NOTES
SUBJECTIVE:    Patient ID: Luz Marina Moses is a 64 y.o. female.    Chief Complaint: COPD; Hypertension; and URI (started yesterday afternoon, woke up this morning this morning and feels like everything is swollen)    63 yo female presents to clinic with more complaints than a 20 minute appointment can reasonable manage.  Things e are going to manage at this appointment.    Routine screenings:  Mammogram  Low Dose chest CT for her >30 pack year hx of smoking.    Medications refills: Vertigo, nausea, allergies    Review of her neck MRI ordered by her pain management physician. MRI of her neck which remarks on a sharply marginated oval lesion immediately superior to the floor of the mouth along the left side, measuring 1.6 cm in the this chronic 2 pack a day smoker.    Examine her foot rash pruritic rash on the sole of her right foot, with scaling a flaking of the skin.    Pt has chronic arthralgias, she has been followed by Rheumatology in the past, no labs on file, no rheum notes on file has been diagnosed with Fibromyalgia.    Review her most recent labs: Hyperlipidemia, Pt has increased risk of ASCVD 16% risk of MI or stroke in the next ten years pt not on statin due to intolerance ( Has had bad reactions to lipitor and crestor).    Today pt is complaining of upper respiratory complainst, runny nose and nasal congestion and an increased cough, no fevers chills or sweats no vomiting or diarrhea.  She has COPD but quit using her medications because it made her face swell  (Salmeterol) but she is using her albuterol 3-4 times a day. She is also using mucinex.    COPD   This is a chronic problem. The current episode started more than 1 year ago. The problem occurs daily. The problem has been waxing and waning. Associated symptoms include arthralgias (Chronic diffuse), congestion, coughing and a rash. Pertinent negatives include no diaphoresis, fatigue, fever, sore throat or vomiting. The symptoms are aggravated by  walking. Treatments tried: Albuterol. The treatment provided moderate relief.   Hypertension   This is a chronic problem. The current episode started more than 1 year ago. The problem is unchanged. The problem is controlled. There are no associated agents to hypertension. Risk factors for coronary artery disease include dyslipidemia, obesity, sedentary lifestyle, smoking/tobacco exposure and post-menopausal state. Past treatments include ACE inhibitors and diuretics. The current treatment provides moderate improvement. Compliance problems include exercise and diet.    URI    This is a new problem. The current episode started in the past 7 days. The problem has been unchanged. There has been no fever. Associated symptoms include congestion, coughing, a plugged ear sensation, a rash and rhinorrhea. Pertinent negatives include no diarrhea, dysuria, sinus pain, sneezing, sore throat or vomiting. Treatments tried: Mucinex.   Rash   This is a chronic problem. The current episode started more than 1 month ago. The problem has been gradually worsening since onset. The affected locations include the right foot. The rash is characterized by dryness and peeling. She was exposed to nothing. Associated symptoms include congestion, coughing and rhinorrhea. Pertinent negatives include no diarrhea, fatigue, fever, sore throat or vomiting. Past treatments include nothing. The treatment provided no relief.     Glucose 70 - 110 mg/dL 89      Chol 239  HDL 40        Past Medical History:   Diagnosis Date    Allergy     Anemia     Anxiety     Arthritis     COPD (chronic obstructive pulmonary disease)     Depression     Encounter for blood transfusion     Fibromyalgia     currently on disability for fibromyalgia.     H/O fibromyalgia     Heart murmur     Hepatitis C 01/2017    Hyperlipidemia     Hypertension     IBS (irritable bowel syndrome)     Osteoarthritis     Osteoporosis     PTSD (post-traumatic stress  disorder)      Social History     Socioeconomic History    Marital status:      Spouse name: Not on file    Number of children: 2    Years of education: Not on file    Highest education level: Not on file   Occupational History    Occupation: Disabled-      Comment: PTSD, Archana   Social Needs    Financial resource strain: Not on file    Food insecurity:     Worry: Not on file     Inability: Not on file    Transportation needs:     Medical: Not on file     Non-medical: Not on file   Tobacco Use    Smoking status: Current Every Day Smoker     Packs/day: 1.50     Years: 40.00     Pack years: 60.00     Start date: 1971    Smokeless tobacco: Never Used   Substance and Sexual Activity    Alcohol use: No     Comment: denies use    Drug use: No    Sexual activity: Never   Lifestyle    Physical activity:     Days per week: Not on file     Minutes per session: Not on file    Stress: Not on file   Relationships    Social connections:     Talks on phone: Not on file     Gets together: Not on file     Attends Mandaen service: Not on file     Active member of club or organization: Not on file     Attends meetings of clubs or organizations: Not on file     Relationship status: Not on file   Other Topics Concern    Not on file   Social History Narrative    Currently on disability for fibromyalgia.         1 step son and 1 son.     Past Surgical History:   Procedure Laterality Date     SECTION      FRACTURE SURGERY      HYSTERECTOMY      partial     LEG SURGERY Left 16    orif    OPEN REDUCTION INTERNAL FIXATION-ANKLE-Medial and Lateral Left 2016    [facility]    TONSILLECTOMY       Family History   Problem Relation Age of Onset    Arthritis Mother     Kidney disease Mother     Depression Mother     Diabetes Mother     Early death Mother     Hypertension Mother     Mental illness Mother     Miscarriages / Stillbirths Mother     Stroke  Sister     Stroke Brother     Hyperlipidemia Brother     Stroke Sister     Alcohol abuse Brother     Liver disease Brother     Thrombophlebitis Other     Cancer Sister 45        breast     Arthritis Sister     Heart disease Maternal Grandfather      Current Outpatient Medications   Medication Sig Dispense Refill    alendronate (FOSAMAX) 70 MG tablet Take 1 tablet (70 mg total) by mouth every 7 days. 4 tablet 5    escitalopram oxalate (LEXAPRO) 10 MG tablet Take 10 mg by mouth Daily.      fluticasone propionate (FLONASE) 50 mcg/actuation nasal spray 2 sprays (100 mcg total) by Each Nare route once daily. 16 g 5    gabapentin (NEURONTIN) 600 MG tablet Take 600 mg by mouth 3 (three) times daily.      lisinopril (PRINIVIL,ZESTRIL) 20 MG tablet Take 1 tablet (20 mg total) by mouth once daily. 90 tablet 1    meclizine (ANTIVERT) 25 mg tablet Take 1 tablet (25 mg total) by mouth 3 (three) times daily as needed. 30 tablet 1    morphine (MSIR) 15 MG tablet 15 mg every 6 (six) hours as needed.       NARCAN 4 mg/actuation Spry       nystatin (MYCOSTATIN) 100,000 unit/mL suspension RETAIN 4ML IN MOUTH AS LONG AS POSSIBLE FOUR TIMES A DAY FOR 10 DAYS. 100 mL 0    ondansetron (ZOFRAN) 8 MG tablet Take 1 tablet (8 mg total) by mouth every 6 to 8 hours as needed (Nausea). 30 tablet 0    tizanidine (ZANAFLEX) 4 MG tablet Take 4 mg by mouth every 6 (six) hours as needed.      traMADol (ULTRAM) 50 mg tablet Take 50 mg by mouth 2 (two) times daily.  0    triamterene-hydrochlorothiazide 37.5-25 mg (MAXZIDE-25) 37.5-25 mg per tablet Take 1 tablet by mouth once daily. 90 tablet 1    albuterol (PROVENTIL HFA) 90 mcg/actuation inhaler Inhale 2 puffs into the lungs every 6 (six) hours as needed for Wheezing. Rescue 18 g 0    ezetimibe (ZETIA) 10 mg tablet Take 1 tablet (10 mg total) by mouth once daily. 90 tablet 3    pantoprazole (PROTONIX) 40 MG tablet Take 1 tablet (40 mg total) by mouth once daily. 30 tablet 11  "   tiotropium (SPIRIVA) 18 mcg inhalation capsule Inhale 1 capsule (18 mcg total) into the lungs once daily. Controller 1 Box 0     No current facility-administered medications for this visit.      Review of patient's allergies indicates:   Allergen Reactions    Red dye Diarrhea and Rash    Augmentin [amoxicillin-pot clavulanate]     Crestor [rosuvastatin] Other (See Comments)     Chest Pain    Lodine [etodolac] Other (See Comments)     Dizziness     Xarelto [rivaroxaban]      Blister on tongue and soreness all over mouth        Review of Systems   Constitutional: Negative for activity change, appetite change, diaphoresis, fatigue, fever and unexpected weight change.   HENT: Positive for congestion and rhinorrhea. Negative for sinus pain, sneezing and sore throat.    Respiratory: Positive for cough.    Gastrointestinal: Negative for anal bleeding, constipation, diarrhea and vomiting.   Genitourinary: Negative for difficulty urinating, dysuria, frequency and urgency.   Musculoskeletal: Positive for arthralgias (Chronic diffuse).   Skin: Positive for rash. Negative for color change, pallor and wound.          Blood pressure 130/82, pulse 96, temperature 98.4 °F (36.9 °C), temperature source Oral, resp. rate 20, height 5' 7" (1.702 m), weight 81.4 kg (179 lb 8 oz), SpO2 97 %. Body mass index is 28.11 kg/m².   Objective:      Physical Exam   Constitutional: She appears well-developed and well-nourished. No distress.   Pt is at base line for her chronic diffuse arthralgias.   HENT:   Head: Normocephalic and atraumatic.   Right Ear: External ear normal.   Left Ear: External ear normal.   Mouth/Throat: Oropharynx is clear and moist.   Observed the floor of her mouth, no evidence of mass to visual inspection.   Eyes: Pupils are equal, round, and reactive to light. Conjunctivae and EOM are normal.   Cardiovascular: Normal rate, regular rhythm and normal heart sounds.   No murmur heard.  Pulmonary/Chest: Effort normal " and breath sounds normal. No respiratory distress. She has no wheezes.   Skin: Skin is warm and dry. Capillary refill takes less than 2 seconds. Rash (Appear to be Tinea pedis, with scaling and serpiginous border.) noted. She is not diaphoretic.           Assessment:       1. Simple chronic bronchitis    2. Essential hypertension    3. Other hyperlipidemia    4. Oral mass    5. Chronic hepatitis C without hepatic coma    6. Arthralgia, unspecified joint    7. Screening for cancer    8. Nausea    9. Vertigo    10. Non-seasonal allergic rhinitis due to pollen    11. Breast cancer screening    12. Personal history of nicotine dependence          Plan:           Simple chronic bronchitis  -     tiotropium (SPIRIVA) 18 mcg inhalation capsule; Inhale 1 capsule (18 mcg total) into the lungs once daily. Controller  Dispense: 1 Box; Refill: 0  -     albuterol (PROVENTIL HFA) 90 mcg/actuation inhaler; Inhale 2 puffs into the lungs every 6 (six) hours as needed for Wheezing. Rescue  Dispense: 18 g; Refill: 0  I suspect that she has a URI, I gave patient a hand written note to  OTC medications (Afrin, Antihistamine decongestant combination, and Mucinex) pt states that she cant take afrin or sudafed because she has allergy to red dye, I have asked her to begin using a Long acting medication daily so she does not need to use the albuterol 3-4 times a day. Pt seems to have lots of reasons why she can't take medications.    Essential hypertension  -     lisinopril (PRINIVIL,ZESTRIL) 20 MG tablet; Take 1 tablet (20 mg total) by mouth once daily.  Dispense: 90 tablet; Refill: 1  -     triamterene-hydrochlorothiazide 37.5-25 mg (MAXZIDE-25) 37.5-25 mg per tablet; Take 1 tablet by mouth once daily.  Dispense: 90 tablet; Refill: 1  Pt is doing well with her BP her medications are not causing any side effects, she will continue.    Other hyperlipidemia  -     ezetimibe (ZETIA) 10 mg tablet; Take 1 tablet (10 mg total) by mouth  once daily.  Dispense: 90 tablet; Refill: 3  Pt has had severe chest pain causing her to go to the er 2 time after starting statins so she cannot tolerate, will try zetia.    Oral mass  -     Ambulatory referral to ENT  I will refer to ENT to evaluate this mass in the floor of her mouth.    Chronic hepatitis C without hepatic coma  -     Ambulatory referral to Gastroenterology  Pt with HEP C initially noted > 1 years pt has not made any attempt to get follow up or activate her referral.    Arthralgia, unspecified joint  -     KEVIN Screen w/rflx; Future; Expected date: 05/07/2019  -     Rheumatoid factor; Future; Expected date: 05/07/2019  Pt with chronic arthralgias, she has been diagnosed with fibromyalgia, I suspect that these test have been completed and were negative but I cannot find them on file.    Screening for cancer  -     CT Chest Lung Screening Low Dose; Future; Expected date: 05/08/2019  Routine health maintenance.  Nausea  -     ondansetron (ZOFRAN) 8 MG tablet; Take 1 tablet (8 mg total) by mouth every 6 to 8 hours as needed (Nausea).  Dispense: 30 tablet; Refill: 0  Not currently an issue will refill her medications.  Vertigo  -     meclizine (ANTIVERT) 25 mg tablet; Take 1 tablet (25 mg total) by mouth 3 (three) times daily as needed.  Dispense: 30 tablet; Refill: 1  Not currently an issue will refill her medications.  Non-seasonal allergic rhinitis due to pollen  -     fluticasone propionate (FLONASE) 50 mcg/actuation nasal spray; 2 sprays (100 mcg total) by Each Nare route once daily.  Dispense: 16 g; Refill: 5  Not currently an issue will refill her medications.  Breast cancer screening  -     Mammo Digital Screening Bilat w/ Rick; Future; Expected date: 05/07/2019  Routine health maintenance  Personal history of nicotine dependence   -     CT Chest Lung Screening Low Dose; Future; Expected date: 05/08/2019  Routine health maintenance    Other orders  -     pantoprazole (PROTONIX) 40 MG tablet;  Take 1 tablet (40 mg total) by mouth once daily.  Dispense: 30 tablet; Refill: 11

## 2019-05-09 ENCOUNTER — LAB VISIT (OUTPATIENT)
Dept: LAB | Facility: HOSPITAL | Age: 64
End: 2019-05-09
Attending: FAMILY MEDICINE
Payer: MEDICARE

## 2019-05-09 ENCOUNTER — TELEPHONE (OUTPATIENT)
Dept: FAMILY MEDICINE | Facility: CLINIC | Age: 64
End: 2019-05-09

## 2019-05-09 DIAGNOSIS — M25.50 PAIN IN JOINT, MULTIPLE SITES: ICD-10-CM

## 2019-05-09 DIAGNOSIS — Z12.11 COLON CANCER SCREENING: ICD-10-CM

## 2019-05-09 DIAGNOSIS — J30.1 NON-SEASONAL ALLERGIC RHINITIS DUE TO POLLEN: ICD-10-CM

## 2019-05-09 DIAGNOSIS — K21.9 GASTROESOPHAGEAL REFLUX DISEASE, ESOPHAGITIS PRESENCE NOT SPECIFIED: Primary | ICD-10-CM

## 2019-05-09 DIAGNOSIS — M25.50 PAIN IN JOINT, MULTIPLE SITES: Primary | ICD-10-CM

## 2019-05-09 LAB — RHEUMATOID FACT SERPL-ACNC: <10 IU/ML (ref 0–15)

## 2019-05-09 PROCEDURE — 36415 COLL VENOUS BLD VENIPUNCTURE: CPT

## 2019-05-09 PROCEDURE — 86038 ANTINUCLEAR ANTIBODIES: CPT

## 2019-05-09 PROCEDURE — 86431 RHEUMATOID FACTOR QUANT: CPT

## 2019-05-10 ENCOUNTER — TELEPHONE (OUTPATIENT)
Dept: FAMILY MEDICINE | Facility: CLINIC | Age: 64
End: 2019-05-10

## 2019-05-10 ENCOUNTER — TELEPHONE (OUTPATIENT)
Dept: HEPATOLOGY | Facility: CLINIC | Age: 64
End: 2019-05-10

## 2019-05-10 DIAGNOSIS — Z12.9 SCREENING FOR CANCER: ICD-10-CM

## 2019-05-10 DIAGNOSIS — F17.210 CIGARETTE SMOKER: Primary | ICD-10-CM

## 2019-05-10 LAB — ANA SER QL IF: NORMAL

## 2019-05-10 NOTE — TELEPHONE ENCOUNTER
Dr. Baig ordered that patient be scheduled for hep c consult.  I spoke with patient attempting to schedule consult appt with PA Scheuermann.  She stated that she just woke up and needed to think about things and call us back.  Patient provided with contact info for scheduling.

## 2019-05-10 NOTE — TELEPHONE ENCOUNTER
Please review and approve the order. I re ordered it with one of the codes that the Imaging center sent over that will cover the test, if this code does not they will notify the office in regards to it.

## 2019-05-13 ENCOUNTER — TELEPHONE (OUTPATIENT)
Dept: FAMILY MEDICINE | Facility: CLINIC | Age: 64
End: 2019-05-13

## 2019-05-13 RX ORDER — LEVOFLOXACIN 750 MG/1
750 TABLET ORAL DAILY
Qty: 5 TABLET | Refills: 0 | Status: SHIPPED | OUTPATIENT
Start: 2019-05-13 | End: 2019-09-17 | Stop reason: ALTCHOICE

## 2019-05-13 NOTE — TELEPHONE ENCOUNTER
Patient called to state that she is not feeling any better. She stated that she was told to notify us after five days to see if she improved. Patient states that she has gotten worse and she is producing green mucus. She is asking that we call in antibiotics to her local pharmacy. Patient states that she may have developed bronchitis.

## 2019-05-17 ENCOUNTER — PATIENT MESSAGE (OUTPATIENT)
Dept: FAMILY MEDICINE | Facility: CLINIC | Age: 64
End: 2019-05-17

## 2019-07-23 ENCOUNTER — TELEPHONE (OUTPATIENT)
Dept: FAMILY MEDICINE | Facility: CLINIC | Age: 64
End: 2019-07-23

## 2019-07-23 DIAGNOSIS — J30.1 NON-SEASONAL ALLERGIC RHINITIS DUE TO POLLEN: ICD-10-CM

## 2019-07-23 DIAGNOSIS — K21.9 GASTROESOPHAGEAL REFLUX DISEASE, ESOPHAGITIS PRESENCE NOT SPECIFIED: ICD-10-CM

## 2019-07-23 DIAGNOSIS — Z12.11 COLON CANCER SCREENING: Primary | ICD-10-CM

## 2019-07-24 NOTE — TELEPHONE ENCOUNTER
Both of these consults were placed 09 May 2019 to the same physicians, Before this these consults were placed 07 May 2019 to local providers.    Please ask this patient to activate these referrals and set up these appointments, or possibly find a PCP in her local area.

## 2019-08-17 DIAGNOSIS — J42 CHRONIC BRONCHITIS, UNSPECIFIED CHRONIC BRONCHITIS TYPE: ICD-10-CM

## 2019-08-19 RX ORDER — SALMETEROL XINAFOATE 50 UG/1
POWDER, METERED ORAL; RESPIRATORY (INHALATION)
Refills: 0 | OUTPATIENT
Start: 2019-08-19

## 2019-08-21 DIAGNOSIS — R11.0 NAUSEA: ICD-10-CM

## 2019-08-21 DIAGNOSIS — R42 VERTIGO: ICD-10-CM

## 2019-08-21 RX ORDER — MECLIZINE HYDROCHLORIDE 25 MG/1
25 TABLET ORAL 3 TIMES DAILY PRN
Qty: 30 TABLET | Refills: 1 | Status: SHIPPED | OUTPATIENT
Start: 2019-08-21 | End: 2020-01-28 | Stop reason: SDUPTHER

## 2019-08-21 RX ORDER — ONDANSETRON HYDROCHLORIDE 8 MG/1
TABLET, FILM COATED ORAL
Qty: 30 TABLET | Refills: 0 | Status: SHIPPED | OUTPATIENT
Start: 2019-08-21 | End: 2020-04-15

## 2019-08-29 DIAGNOSIS — J44.1 COPD WITH EXACERBATION: Primary | ICD-10-CM

## 2019-09-10 ENCOUNTER — PES CALL (OUTPATIENT)
Dept: ADMINISTRATIVE | Facility: CLINIC | Age: 64
End: 2019-09-10

## 2019-09-17 ENCOUNTER — TELEPHONE (OUTPATIENT)
Dept: FAMILY MEDICINE | Facility: CLINIC | Age: 64
End: 2019-09-17

## 2019-09-17 ENCOUNTER — OFFICE VISIT (OUTPATIENT)
Dept: INTERNAL MEDICINE | Facility: CLINIC | Age: 64
End: 2019-09-17
Payer: MEDICARE

## 2019-09-17 VITALS
OXYGEN SATURATION: 96 % | DIASTOLIC BLOOD PRESSURE: 78 MMHG | HEIGHT: 66 IN | SYSTOLIC BLOOD PRESSURE: 130 MMHG | BODY MASS INDEX: 30.51 KG/M2 | WEIGHT: 189.81 LBS | HEART RATE: 85 BPM

## 2019-09-17 DIAGNOSIS — F17.210 CIGARETTE SMOKER: Primary | ICD-10-CM

## 2019-09-17 DIAGNOSIS — J41.0 SIMPLE CHRONIC BRONCHITIS: ICD-10-CM

## 2019-09-17 DIAGNOSIS — Z12.39 SCREENING BREAST EXAMINATION: Primary | ICD-10-CM

## 2019-09-17 DIAGNOSIS — D64.9 ANEMIA, UNSPECIFIED TYPE: ICD-10-CM

## 2019-09-17 DIAGNOSIS — I10 ESSENTIAL HYPERTENSION: ICD-10-CM

## 2019-09-17 DIAGNOSIS — Z00.00 ENCOUNTER FOR PREVENTIVE HEALTH EXAMINATION: Primary | ICD-10-CM

## 2019-09-17 DIAGNOSIS — R20.2 NUMBNESS AND TINGLING: ICD-10-CM

## 2019-09-17 DIAGNOSIS — Z72.0 TOBACCO USE: ICD-10-CM

## 2019-09-17 DIAGNOSIS — F32.A DEPRESSION, UNSPECIFIED DEPRESSION TYPE: ICD-10-CM

## 2019-09-17 DIAGNOSIS — R20.8 BURNING SENSATION: ICD-10-CM

## 2019-09-17 DIAGNOSIS — B18.2 CHRONIC HEPATITIS C WITHOUT HEPATIC COMA: ICD-10-CM

## 2019-09-17 DIAGNOSIS — E66.9 OBESITY (BMI 30.0-34.9): ICD-10-CM

## 2019-09-17 DIAGNOSIS — R20.0 NUMBNESS AND TINGLING: ICD-10-CM

## 2019-09-17 DIAGNOSIS — F43.10 PTSD (POST-TRAUMATIC STRESS DISORDER): ICD-10-CM

## 2019-09-17 DIAGNOSIS — M79.7 FIBROMYALGIA: ICD-10-CM

## 2019-09-17 DIAGNOSIS — E78.5 HYPERLIPIDEMIA, UNSPECIFIED HYPERLIPIDEMIA TYPE: ICD-10-CM

## 2019-09-17 DIAGNOSIS — M81.0 OSTEOPOROSIS, UNSPECIFIED OSTEOPOROSIS TYPE, UNSPECIFIED PATHOLOGICAL FRACTURE PRESENCE: ICD-10-CM

## 2019-09-17 PROBLEM — E66.811 OBESITY (BMI 30.0-34.9): Status: ACTIVE | Noted: 2019-09-17

## 2019-09-17 PROCEDURE — 99999 PR PBB SHADOW E&M-EST. PATIENT-LVL V: CPT | Mod: PBBFAC,,, | Performed by: NURSE PRACTITIONER

## 2019-09-17 PROCEDURE — 99215 OFFICE O/P EST HI 40 MIN: CPT | Mod: PBBFAC | Performed by: NURSE PRACTITIONER

## 2019-09-17 PROCEDURE — 99999 PR PBB SHADOW E&M-EST. PATIENT-LVL V: ICD-10-PCS | Mod: PBBFAC,,, | Performed by: NURSE PRACTITIONER

## 2019-09-17 PROCEDURE — G0439 PPPS, SUBSEQ VISIT: HCPCS | Mod: ,,, | Performed by: NURSE PRACTITIONER

## 2019-09-17 PROCEDURE — G0439 PR MEDICARE ANNUAL WELLNESS SUBSEQUENT VISIT: ICD-10-PCS | Mod: ,,, | Performed by: NURSE PRACTITIONER

## 2019-09-17 RX ORDER — BUPROPION HYDROCHLORIDE 75 MG/1
75 TABLET ORAL DAILY
COMMUNITY
End: 2020-09-23 | Stop reason: SDUPTHER

## 2019-09-17 NOTE — PATIENT INSTRUCTIONS
Counseling and Referral of Other Preventative  (Italic type indicates deductible and co-insurance are waived)    Patient Name: Luz Marina Moses  Today's Date: 9/17/2019    Health Maintenance       Date Due Completion Date    Pneumococcal Vaccine (Medium Risk) (1 of 1 - PPSV23) 03/01/1974 ---    Shingles Vaccine (1 of 2) 03/01/2005 ---    LDCT Lung Screen 03/01/2010 ---    Influenza Vaccine (1) 09/01/2019 ---    TETANUS VACCINE 11/06/2019 (Originally [birth year]1/1973) ---    DEXA SCAN 01/26/2020 1/26/2017    Override on 10/11/2016: Done    Lipid Panel 05/03/2020 5/3/2019    Mammogram 05/29/2020 5/29/2018    Override on 4/1/2015: Done    Colonoscopy 11/06/2027 11/6/2017 (Declined)    Override on 11/6/2017: Declined        Orders Placed This Encounter   Procedures    Ambulatory referral to Smoking Cessation Program     The following information is provided to all patients.  This information is to help you find resources for any of the problems found today that may be affecting your health:                Living healthy guide: www.[facility].[place].gov      Understanding Diabetes: www.diabetes.org      Eating healthy: www.cdc.gov/healthyweight      CDC home safety checklist: www.cdc.gov/steadi/patient.html      Agency on Aging: www.goea.[place].[facility]      Alcoholics anonymous (AA): www.aa.org      Physical Activity: www.caitlin.nih.gov/ox2mqin      Tobacco use: www.quitwithusla.org

## 2019-09-17 NOTE — TELEPHONE ENCOUNTER
----- Message from Nilsa Bernal MA sent at 9/17/2019  9:22 AM CDT -----  Please contact patient about Mammo/CT chest order was cancelled in chart. Thank you   Nilsa Bernal  9/17/2019

## 2019-09-17 NOTE — TELEPHONE ENCOUNTER
Morning Magdalena, could you or someone in your office get these test scheduled for Mrs Moses? Thanks for your help with this matter.

## 2019-09-17 NOTE — PROGRESS NOTES
"Luz Marina Moses presented for a  Medicare AWV and comprehensive Health Risk Assessment today. The following components were reviewed and updated:    · Medical history  · Family History  · Social history  · Allergies and Current Medications  · Health Risk Assessment  · Health Maintenance  · Care Team     ** See Completed Assessments for Annual Wellness Visit within the encounter summary.**       The following assessments were completed:  · Living Situation  · CAGE  · Depression Screening  · Timed Get Up and Go  · Whisper Test  · Cognitive Function Screening  · Nutrition Screening  · ADL Screening  · PAQ Screening    Vitals:    09/17/19 0741   BP: 130/78   BP Location: Left arm   Patient Position: Sitting   BP Method: Medium (Manual)   Pulse: 85   SpO2: 96%   Weight: 86.1 kg (189 lb 13.1 oz)   Height: 5' 5.75" (1.67 m)     Body mass index is 30.87 kg/m².  Physical Exam   Constitutional: She is oriented to person, place, and time. She appears well-developed and well-nourished.   HENT:   Head: Normocephalic.   Cardiovascular: Normal rate, regular rhythm and normal heart sounds.   No murmur heard.  Pulses:       Dorsalis pedis pulses are 2+ on the right side, and 2+ on the left side.   Pulmonary/Chest: Effort normal. No respiratory distress.   End inspiratory wheeze noted to left upper lobe, otherwise breath sounds clear.    Abdominal: Soft. She exhibits no mass. There is no tenderness.   Musculoskeletal: Normal range of motion. She exhibits no edema.   Feet:   Right Foot:   Protective Sensation: 10 sites tested. 10 sites sensed.   Skin Integrity: Negative for ulcer or blister.   Left Foot:   Protective Sensation: 10 sites tested. 10 sites sensed.   Skin Integrity: Negative for ulcer or blister.   Neurological: She is alert and oriented to person, place, and time. She exhibits normal muscle tone.   Skin: Skin is warm, dry and intact.   Bilateral feet warm to touch with normal color.    Psychiatric: She has a normal mood and " affect. Her speech is normal and behavior is normal.   Nursing note and vitals reviewed.        Diagnoses and health risks identified today and associated recommendations/orders:    1. Encounter for preventive health examination  She declines vaccines.   MA to schedule:  Gastroenterology  ENT  Dermatology per patient request for skin cancer check.     2. Simple chronic bronchitis  Reports a  Persistent productive cough, not new and not worsening. Reports improved with medication. End inspiratory wheeze noted on PE (see above). Reports she has not used her inhaler this am. Reports she is at her respiratory baseline. Denies SOB, fever, wheeze. Reports she knows to seek medical attention for exacerbation symptoms (denies any at this time).   Stable. Continue current treatment plan as previously prescribed with your PCP.      3. Essential hypertension  Stable and controlled. Continue current treatment plan as previously prescribed with your PCP.     4. Hyperlipidemia, unspecified hyperlipidemia type  Not  At goal.   Continue current treatment plan as previously prescribed with your PCP.     5. Chronic hepatitis C without hepatic coma  Advised to follow up with gastroenterology for further evaluation and treatment. She expressed understanding.      6. Anemia, unspecified type  Continue current treatment plan as previously prescribed with your PCP.     7. Depression, unspecified depression type  PHQ 2-1.   She has seen psychiatry department in past but lost to follow up. Psychiatry department contact information given  Advised to follow up with PCP/psychiatry for further evaluation and recommendations. She expressed understanding.      8. PTSD (post-traumatic stress disorder)  See #7    9. Fibromyalgia  Continue current treatment plan as previously prescribed with your PCP.     10. Osteoporosis, unspecified osteoporosis type, unspecified pathological fracture presence  DEXA 1/2017  Reports she is not taking medication.    Advised to follow up with PCP for further evaluation and recommendations. She expressed understanding.      11. Tobacco use  Discussed the importance of smoking cessation and advised to quit smoking. Patient expressed understanding.   - Ambulatory referral to Smoking Cessation Program    12. Obesity (BMI 30.0-34.9)  Encouraged healthy diet and exercise as tolerated to help bring BMI into normal range.     13. Numbness and tingling  Reports intermittent numbness and tingling in bilateral hands, ongoing for about a year, denies any worsening.   Advised to follow up with PCP/pain management for further evaluation and treatment. She expressed understanding.      14. Burning sensation  Reports intermittent burning sensation in bilateral feet, ongoing for about a month, denies any worsening.   Encouraged daily foot inspections.   Advised to follow up with PCP/pain management for further evaluation and treatment. She expressed understanding.        Provided Luz Marina with a 5-10 year written screening schedule and personal prevention plan.  Education, counseling, and referrals were provided as needed. After Visit Summary printed and given to patient which includes a list of additional screenings\tests needed.    Follow up in about 1 year (around 9/17/2020) for AWV.    Juju Reyes NP  I offered to discuss end of life issues, including information on how to make advance directives that the patient could use to name someone who would make medical decisions on their behalf if they became too ill to make themselves.    ___Patient declined  _X_Patient is interested, I provided paper work and offered to discuss.

## 2019-09-23 NOTE — PROGRESS NOTES
"Referring Provider:    No referring provider defined for this encounter.  Subjective:   Patient: Luz Marina Moses [id number], :1955   Visit date:2019 11:42 AM    Chief Complaint:  Dizziness    HPI:  Luz Marina is a 64 y.o. female who I was asked to see in consultation for evaluation of the following issue(s):    Dizziness/Vertigo:  Onset:  2 week(s)  Total number of episodes:  daily  Duration of individual episodes: minutes  Severity: moderate  Exacerbating factors: head back  Prior Medications: meclizine (Antivert) - last taken 5 days ago  Relieving factors:  remaining motionless  Associated signs and symptoms:  None  Quality:   Spinning- Yes   Light headedness- Yes   Sensation that room is moving but patient is motionless- Yes  Prior history of similar events: Yes    Had "vertigo" in her 20's, and underwent a dizzy test. Resolved after    C/o difficulty swallowing - scheduled for EGD 2019  Current Every Day Smoker; Start date: 1971; 1.5 packs/day for 40 years (60.00 pk-yrs)  Currently enrolled for smoking cessation program in October    Underwent recent MRI C-spine with incidental finding of possible ranula. Patient has records in clinic       Review of Systems:  Negative unless checked off.  Gen:  []fever   []fatigue  HENT:  []nosebleeds  []dental problem   Eyes:  []photophobia  []visual disturbance  Resp:  []chest tightness []wheezing  Card:  []chest pain  []leg swelling  GI:  []abdominal pain []blood in stool  :  []dysuria  []hematuria  Musc:  []joint swelling  []gait problem  Skin:  []color change  []pallor  Neuro:  []seizures  []numbness  Hem:  []bruise/bleed easily  Psych:  []hallucinations  []behavioral problems  Allergy/Imm: is allergic to atorvastatin; red dye; spiriva respimat [tiotropium bromide]; augmentin [amoxicillin-pot clavulanate]; crestor [rosuvastatin]; lodine [etodolac]; and xarelto [rivaroxaban].    Her meds, allergies, medical, surgical, social & family histories were reviewed & " updated:  -     She has a current medication list which includes the following prescription(s): albuterol, alendronate, bupropion, docusate sodium, escitalopram oxalate, esomeprazole, ezetimibe, fluticasone propionate, gabapentin, lisinopril, meclizine, morphine, narcan, ondansetron, salmeterol, tiotropium, tizanidine, tramadol, and triamterene-hydrochlorothiazide 37.5-25 mg.  -     She  has a past medical history of Allergy, Anemia, Anxiety, Arthritis, COPD (chronic obstructive pulmonary disease), Depression, Encounter for blood transfusion, Encounter for screening mammogram for breast cancer (2018), Fibromyalgia, H/O fibromyalgia, Heart murmur, Hepatitis C (2017), Hyperlipidemia, Hypertension, IBS (irritable bowel syndrome), Need for hepatitis C screening test (2018), Osteoarthritis, Osteoporosis, and PTSD (post-traumatic stress disorder).   -     She does not have any pertinent problems on file.   -     She  has a past surgical history that includes Tonsillectomy;  section; Hysterectomy; Leg Surgery (Left, 16); and Fracture surgery.  -     She  reports that she has been smoking. She started smoking about 48 years ago. She has a 60.00 pack-year smoking history. She has never used smokeless tobacco. She reports that she does not drink alcohol or use drugs.  -     Her family history includes Alcohol abuse in her brother; Arthritis in her mother and sister; Cancer (age of onset: 45) in her sister; Depression in her mother; Diabetes in her mother; Early death in her mother; Heart disease in her maternal grandfather; Hyperlipidemia in her brother; Hypertension in her mother; Kidney disease in her mother; Liver disease in her brother; Mental illness in her mother; Miscarriages / Stillbirths in her mother; Stroke in her brother, sister, and sister; Thrombophlebitis in her other.  -     She is allergic to atorvastatin; red dye; spiriva respimat [tiotropium bromide]; augmentin [amoxicillin-pot  "clavulanate]; crestor [rosuvastatin]; lodine [etodolac]; and xarelto [rivaroxaban].    Objective:     Physical Exam: (abnormal findings indicated in BOLD)    Physical Exam:  Vitals:  BP (!) 148/79   Pulse 85   Temp 98 °F (36.7 °C) (Tympanic)   Ht 5' 6" (1.676 m)   Wt 84.1 kg (185 lb 6.5 oz)   BMI 29.93 kg/m²   General appearance:  Well developed, well nourished    Eyes:  Extraocular motions intact, PERRL    Communication:  no hoarseness, no dysphonia    Ears:  Otoscopy of external auditory canals and tympanic membranes was normal, clinical speech reception thresholds grossly intact, no mass/lesion of auricle.  Nose:  No masses/lesions of external nose, nasal mucosa, septum, and turbinates were within normal limits.  Mouth:  No mass/lesion of lips, teeth, gums, hard/soft palate, tongue, tonsils, or oropharynx.    Cardiovascular:  No pedal edema; Radial Pulses +2     Neck & Lymphatics:  No cervical lymphadenopathy, no neck mass/crepitus/ asymmetry, trachea is midline, no thyroid enlargement/tenderness/mass.    Psych: Oriented x3,  Alert with normal mood and affect.     Respiration/Chest:  Symmetric expansion during respiration, normal respiratory effort.    Skin:  Warm and intact. No ulcerations of face, scalp, neck.    CRANIAL NERVES:  III, IV, VI:  Extraocular muscles intact.  Pupils equally reactive to light and accommodation.  V: Facial sensory function to light touch intact and symmetrical.  No evidence of atrophy of the masseter and temporal muscles.  VII:  Facial strength intact and symmetrical.  IX:  Soft palate elevates in the midline.  XI:  Shoulders motility and strength intact and symmetrical. No atrophy of sternocleidomastoid and trapezius muscles.  XII:  Tongue motility and strength intact.  No spontaneous or gaze nystagmus.    Harwich-Hallpike test:  No vertigo or nystagmus.    Head-shaking test:  No nystagmus.  Vestibulo-ocular reflex:  No evidence of catch-up saccades to bilateral head turns.  " Oculo-counter torsion:  Intact to bilateral head tilts.  Romberg test:  No abnormal sway or falls with eyes open and closed.    Fukuda stepping test:  No abnormal sway.    Gait:  No ataxia and can tandem gait 6 steps.    Finger-to-nose testing intact without dysmetria.  Deviation of index.  Eyes closed:  No deviation.  MOTOR STRENGTH:  Strength 5/5 throughout.  SENSORY:  Sensory function to light touch and proprioception intact throughout.    DH: Negative bilaterally                Assessment & Plan:   Luz Marina was seen today for dizziness.    Diagnoses and all orders for this visit:    Smoking    Neck mass    Dizziness          DIZZINESS-   Dizziness is an extremely complex problem that may arise from many sources.  I requires the coordination between the visual system, the vestibular system as well as the proprioreceptive system.  Additionally, balance is compromised in the setting of musculoskeletal, cerebral, cardiac, and numerous physiologic disorders.  The complex interplay between these systems may also lead to dizziness if there is dysynchrony between the bilateral vestibular symptoms.    Central vestibular symptoms can generally be distinguished from peripheral vestibulopathies by the presence of other non vestibular neurologic symptoms (focal weakness, headache), light headedness (rather than true vertigo), near syncope, weak limbs, panic, fuzziness/cloudiness in mentation, and clumsiness.  Peripheral vestibulopathies are generally, at some point during their course, characterized by a true vertiginous sensation of movement, difficulty with sudden head movements, nausea, difficulty with sudden head movement, and possibly oscicllopsia.    BPPV is the most common cause of episodic vertigo.  Symptoms generally last for seconds then resolve when motionless, otitic symptoms are absent and may be provoked with sudden head motion.  This may occur spontaneously, but frequently follow head trauma or recent vestibular  neuronitis.  Nystagmus is typically geotropic and directed toward the affected ear (hyperactive input to the inferior vestibular nerve via the Singular nerve). Canalith repositioning maneuvers are the method of choice for treating this condition.  Mild imbalance following is common and may last 1-2 weeks.    Vestibular neuronitis and labyrinthitis can be distinguished by the presence of sensorineural hearing loss in labyrinthitis, but during their active phase, vertigo is constant.   MRI may be necessary during this phase to exclude CNS pathology.  Frequently this is preceded by a viral illness. Both result in permanent partial end organ weakness of the affected vestibular nerve (usually superior).  Otherwise, they are essentially the same.  The early (vertiginous, 1-3 days) phase is characterized by acute onset of vertigo that is essentially constant and present even in the absence of motion.  Nystagmus is directed away from the affected ear (hypoactive).  In the acute phase, steroids, limited use of vestibular suppressants and hydration are the most effective treatment. Patients then enter the second phase of uncompensated vestibulopathy where they have a general sense of imbalance.  The duration of this phase depends on several factors, but is generally delayed in the presence of vestibular suppressants, advanced age, central/systemic balance issues and sessile behavior.   Phase 3 is compensated vestibulopathy where symptoms generally only occur with sudden head movements and can be seen with catch up saccades on head thrust.   However, in the presence of bilateral VN, oscillopsia is the hallmark of the disease and compensation is frequently very delayed and poor.    Other causes of vertigo that are typically constant are vestibulotoxic medications and autoimmune inner ear disease and these are generally bilateral in nature.    Meniere's disease is typically (85%) unilateral and defined by 2 spontaneous vertigo  attacks lasting 20 min or more, documented hearing loss (typically low tone, frequently fluctuating) and otic fullness or tinnitus in the affected ear. However, the presence of endolymphatic hydrops may result in similar symptoms, not meeting these strict criteria.  This disease can be difficult to distinguish from vestibular migraines, which are not always associated with headaches.    Superior canal dehiscence presents with episodic vertigo lasting seconds to minutes, aural fullness, autophony, hyperacusis and tinnitus (jey pulsatile).  Aural pressure is the most common presenting symptom.  Symptoms can be provoked with Valsalva.  Bone conduction thresholds are supranormal.    Perilymph fistula presents with fluctuating hearing loss, episodic vertigo lasting seconds to minutes and frequently is associated with prior barotrauma or otologic surgery.  Conservative measures such as stool softeners and bedrest frequently lead to resolution.  In cases of persistent symptoms, unfortunately there is no noninvasive diagnostic test with high specificity, and surgical exploration is sometimes necessary when this is expected.    Vestibular schwannomas may have constant or episodic vertigo, frequently SNHL and sometimes may be accompanied by headache or facial numbness.    The diagnosis and options of management were discussed at length with the patient, including hearing, balance function and the risks associated with my recommendations. We spent a considerable amount of time discussing strategies to cope with dizziness. I emphasized the great importance of fall avoidance and activities that may be dangerous if Luz Marina has an episode of dizziness.  I answered all questions in layman's terms. Based on the history, physical exam and imaging studies there is moderate evidence of a vestibular dysfunction.  I recommend a diagnostic VNG.     Neck mass/ranula- recommend CT neck with contrast    Dysphagia- EGD scheduled in near  future.     We discussed her medical conditions, treatments and plan.  Luz Marina should return to clinic if any issues arise (symptoms worsen or persist), otherwise we will see her back in the clinic only as needed.      Thank you for allowing me to participate in the care of Luz Marina.    Sree Geronimo MD      Patient: Luz Marina Moses [id number], :1955  Procedure date:2019  Patient's medications, allergies, past medical, surgical, social and family histories were reviewed and updated as appropriate.  Chief Complaint:  Dizziness    HPI:  Luz Marina is a 64 y.o. female with the history of present illness as discussed in the clinic note from today.    Procedure: Risks, benefits, and alternatives of the procedure were discussed with the patient, and the patient consented to the fiberoptic examination.  We applied a topical nasal decongestant and analgesic.  After adequate anesthesia was obtained, the flexible fiberoptic scope was passed into each nostril independently.  Each nasal cavity, the entire pharynx (nasopharynx to hypopharynx) and the larynx were visualized. At the end of the examination, the scope was removed. The patient tolerated the procedure well with no complications.     Findings:  -     Laryngeal mucosa is normal  -     Posterior commissure has significant hypertrophy  -     Lingual tonsils have no hypertrophy  -     Adenoids have no  hypertrophy  -     Right vocal fold: normal mobility     mass/lesion: none  -     Left vocal fold: normal mobility     mass/lesion: none  -     Other findings: none    Assessment & Plan:  - see today's clinic note

## 2019-09-24 ENCOUNTER — OFFICE VISIT (OUTPATIENT)
Dept: GASTROENTEROLOGY | Facility: CLINIC | Age: 64
End: 2019-09-24
Payer: MEDICARE

## 2019-09-24 ENCOUNTER — OFFICE VISIT (OUTPATIENT)
Dept: OTOLARYNGOLOGY | Facility: CLINIC | Age: 64
End: 2019-09-24
Payer: MEDICARE

## 2019-09-24 VITALS
DIASTOLIC BLOOD PRESSURE: 79 MMHG | HEART RATE: 85 BPM | BODY MASS INDEX: 29.8 KG/M2 | WEIGHT: 185.44 LBS | SYSTOLIC BLOOD PRESSURE: 148 MMHG | HEIGHT: 66 IN | TEMPERATURE: 98 F

## 2019-09-24 VITALS
DIASTOLIC BLOOD PRESSURE: 74 MMHG | HEART RATE: 72 BPM | WEIGHT: 185.44 LBS | BODY MASS INDEX: 29.8 KG/M2 | SYSTOLIC BLOOD PRESSURE: 154 MMHG | HEIGHT: 66 IN

## 2019-09-24 DIAGNOSIS — R42 DIZZINESS: ICD-10-CM

## 2019-09-24 DIAGNOSIS — R14.0 ABDOMINAL BLOATING: ICD-10-CM

## 2019-09-24 DIAGNOSIS — R13.10 DYSPHAGIA, UNSPECIFIED TYPE: ICD-10-CM

## 2019-09-24 DIAGNOSIS — F17.200 SMOKING: Primary | ICD-10-CM

## 2019-09-24 DIAGNOSIS — K83.8 COMMON BILE DUCT DILATION: ICD-10-CM

## 2019-09-24 DIAGNOSIS — B18.2 CHRONIC HEPATITIS C WITHOUT HEPATIC COMA: Primary | ICD-10-CM

## 2019-09-24 DIAGNOSIS — Z12.11 ENCOUNTER FOR SCREENING COLONOSCOPY: ICD-10-CM

## 2019-09-24 DIAGNOSIS — K59.03 DRUG-INDUCED CONSTIPATION: ICD-10-CM

## 2019-09-24 DIAGNOSIS — K21.9 GASTROESOPHAGEAL REFLUX DISEASE, ESOPHAGITIS PRESENCE NOT SPECIFIED: ICD-10-CM

## 2019-09-24 DIAGNOSIS — R22.1 NECK MASS: ICD-10-CM

## 2019-09-24 PROCEDURE — 99999 PR PBB SHADOW E&M-EST. PATIENT-LVL III: ICD-10-PCS | Mod: PBBFAC,,, | Performed by: INTERNAL MEDICINE

## 2019-09-24 PROCEDURE — 31575 DIAGNOSTIC LARYNGOSCOPY: CPT | Mod: PBBFAC | Performed by: OTOLARYNGOLOGY

## 2019-09-24 PROCEDURE — 31575 DIAGNOSTIC LARYNGOSCOPY: CPT | Mod: S$PBB,,, | Performed by: OTOLARYNGOLOGY

## 2019-09-24 PROCEDURE — 31575 PR LARYNGOSCOPY, FLEXIBLE; DIAGNOSTIC: ICD-10-PCS | Mod: S$PBB,,, | Performed by: OTOLARYNGOLOGY

## 2019-09-24 PROCEDURE — 99204 PR OFFICE/OUTPT VISIT, NEW, LEVL IV, 45-59 MIN: ICD-10-PCS | Mod: S$PBB,,, | Performed by: INTERNAL MEDICINE

## 2019-09-24 PROCEDURE — 99204 OFFICE O/P NEW MOD 45 MIN: CPT | Mod: S$PBB,,, | Performed by: INTERNAL MEDICINE

## 2019-09-24 PROCEDURE — 99204 OFFICE O/P NEW MOD 45 MIN: CPT | Mod: 25,S$PBB,, | Performed by: OTOLARYNGOLOGY

## 2019-09-24 PROCEDURE — 99204 PR OFFICE/OUTPT VISIT, NEW, LEVL IV, 45-59 MIN: ICD-10-PCS | Mod: 25,S$PBB,, | Performed by: OTOLARYNGOLOGY

## 2019-09-24 PROCEDURE — 99213 OFFICE O/P EST LOW 20 MIN: CPT | Mod: PBBFAC | Performed by: INTERNAL MEDICINE

## 2019-09-24 PROCEDURE — 99999 PR PBB SHADOW E&M-EST. PATIENT-LVL III: CPT | Mod: PBBFAC,,, | Performed by: INTERNAL MEDICINE

## 2019-09-24 PROCEDURE — 99213 OFFICE O/P EST LOW 20 MIN: CPT | Mod: PBBFAC,27,25 | Performed by: OTOLARYNGOLOGY

## 2019-09-24 PROCEDURE — 99999 PR PBB SHADOW E&M-EST. PATIENT-LVL III: ICD-10-PCS | Mod: PBBFAC,,, | Performed by: OTOLARYNGOLOGY

## 2019-09-24 PROCEDURE — 99999 PR PBB SHADOW E&M-EST. PATIENT-LVL III: CPT | Mod: PBBFAC,,, | Performed by: OTOLARYNGOLOGY

## 2019-09-24 RX ORDER — DOCUSATE SODIUM 100 MG/1
100 CAPSULE, LIQUID FILLED ORAL DAILY
Qty: 30 CAPSULE | Refills: 5 | COMMUNITY
Start: 2019-09-24 | End: 2019-10-24

## 2019-09-24 RX ORDER — ESOMEPRAZOLE MAGNESIUM 40 MG/1
40 GRANULE, DELAYED RELEASE ORAL
Qty: 1200 MG | Refills: 5 | Status: SHIPPED | OUTPATIENT
Start: 2019-09-24 | End: 2021-05-26 | Stop reason: SDUPTHER

## 2019-09-24 NOTE — LETTER
September 24, 2019      Juju Reyes NP  [address] [facility] [address] [place] [address]           Count includes the [facility]  [address] [place] [id number]  Phone: [phone]  Fax: [phone]          Patient: Luz Marina Moses   MR Number: [id number]   YOB: 1955   Date of Visit: 9/24/2019       Dear Juju Reyes:    Thank you for referring Luz Marina Moses to me for evaluation. Attached you will find relevant portions of my assessment and plan of care.    If you have questions, please do not hesitate to call me. I look forward to following Luz Marina Moses along with you.    Sincerely,    Caitlin Johansen MD    Enclosure  CC:  No Recipients    If you would like to receive this communication electronically, please contact [email or web address] or [phone] to request more information on [employer] Link access.    For providers and/or their staff who would like to refer a patient to [facility], please contact us through our one-stop-shop provider referral line, [facility], at [phone].    If you feel you have received this communication in error or would no longer like to receive these types of communications, please e-mail [email or web address]

## 2019-09-24 NOTE — PROGRESS NOTES
"[facility]  Gastroenterology    Patient evaluated at the request of Juju Reyes NP  [address] [place] [address]    PCP: Navjot Baig MD    9/24/19    Chief Complaint: Hepatitis C    Subjective:   Luz Marina Moses is a 64 y.o. female with history of HTN, HLD, COPD, anxiety/depression here for evaluation of chronic HCV. Patient had a screening HCV Ab drawn which was positive in 2017. She had subsequent labs drawn which showed a genotype 1a with a viral load of > 3.6 million RNA copies. Her US abdomen was done at [facility] and was negative for cirrhosis but did show CBD dilation of 1.9 cm without any intrahepatic biliary dilation. It was recommended she go for "another test" but patient failed to follow-up. Her past liver chemistries have been normal. As for how she got the HCV, patient believes it was through a blood transfusion in 1982. She denies tattoos, IV drug use or intranasal cocaine use.     Patient also reports GERD and abdominal bloating. She was previously taking Protonix but this hasn't worked for her. She prefers Nexium and is requesting this in the clinic today. She has been having reflux symptoms for the past 1-2 years. She has never had an EGD before. Her abdominal bloating is intermittent. No N/V or weight loss. In fact, patient has had a 40 lb weight gain since being put on Prozac.    Patient has never had a colonoscopy. It was scheduled twice in the past but she cancelled it. She denies any blood in stool. She has some issues with constipation but sees pain therapy and is on chronic pain medication with Morphine. She is not currently taking any stools softener. She denies any FH of colon cancer.       Past Medical History:   Diagnosis Date    Allergy     Anemia     Anxiety     Arthritis     COPD (chronic obstructive pulmonary disease)     Depression     Encounter for blood transfusion     Encounter for screening mammogram for breast cancer 5/8/2018    " Fibromyalgia     currently on disability for fibromyalgia.     H/O fibromyalgia     Heart murmur     Hepatitis C 2017    Hyperlipidemia     Hypertension     IBS (irritable bowel syndrome)     Need for hepatitis C screening test 2018    Osteoarthritis     Osteoporosis     PTSD (post-traumatic stress disorder)        Past Surgical History:   Procedure Laterality Date     SECTION      FRACTURE SURGERY      HYSTERECTOMY      partial     LEG SURGERY Left 16    orif    TONSILLECTOMY         Current Outpatient Medications on File Prior to Visit   Medication Sig Dispense Refill    albuterol (PROVENTIL HFA) 90 mcg/actuation inhaler Inhale 2 puffs into the lungs every 6 (six) hours as needed for Wheezing. Rescue 18 g 0    alendronate (FOSAMAX) 70 MG tablet Take 1 tablet (70 mg total) by mouth every 7 days. 4 tablet 5    buPROPion (WELLBUTRIN) 75 MG tablet Take 75 mg by mouth once daily.      ezetimibe (ZETIA) 10 mg tablet Take 1 tablet (10 mg total) by mouth once daily. 90 tablet 3    fluticasone propionate (FLONASE) 50 mcg/actuation nasal spray 2 sprays (100 mcg total) by Each Nare route once daily. 16 g 5    gabapentin (NEURONTIN) 600 MG tablet Take 600 mg by mouth 3 (three) times daily.      lisinopril (PRINIVIL,ZESTRIL) 20 MG tablet Take 1 tablet (20 mg total) by mouth once daily. 90 tablet 1    meclizine (ANTIVERT) 25 mg tablet Take 1 tablet (25 mg total) by mouth 3 (three) times daily as needed. 30 tablet 1    morphine (MSIR) 15 MG tablet 15 mg every 6 (six) hours as needed.       NARCAN 4 mg/actuation Spry       ondansetron (ZOFRAN) 8 MG tablet Take 1 tablet (8 mg total) by mouth every 6 to 8 hours as needed (Nausea). 30 tablet 0    salmeterol (SEREVENT) 50 mcg/dose diskus inhaler Inhale 1 puff into the lungs 2 (two) times daily. Controller 1 each 5    tizanidine (ZANAFLEX) 4 MG tablet Take 4 mg by mouth every 6 (six) hours as needed.      triamterene-hydrochlorothiazide  37.5-25 mg (MAXZIDE-25) 37.5-25 mg per tablet Take 1 tablet by mouth once daily. 90 tablet 1    escitalopram oxalate (LEXAPRO) 10 MG tablet Take 10 mg by mouth Daily.      tiotropium (SPIRIVA) 18 mcg inhalation capsule Inhale 1 capsule (18 mcg total) into the lungs once daily. Controller 1 Box 0    traMADol (ULTRAM) 50 mg tablet Take 50 mg by mouth 2 (two) times daily.  0    [DISCONTINUED] pantoprazole (PROTONIX) 40 MG tablet Take 1 tablet (40 mg total) by mouth once daily. 30 tablet 11     No current facility-administered medications on file prior to visit.        Review of patient's allergies indicates:   Allergen Reactions    Atorvastatin     Red dye Diarrhea and Rash    Spiriva respimat [tiotropium bromide] Anaphylaxis    Augmentin [amoxicillin-pot clavulanate]     Crestor [rosuvastatin] Other (See Comments)     Chest Pain    Lodine [etodolac] Other (See Comments)     Dizziness     Xarelto [rivaroxaban]      Blister on tongue and soreness all over mouth        Social History     Socioeconomic History    Marital status:      Spouse name: Not on file    Number of children: 2    Years of education: Not on file    Highest education level: Not on file   Occupational History    Occupation: Disabled-      Comment: PTSD, Archana   Social Needs    Financial resource strain: Not on file    Food insecurity:     Worry: Not on file     Inability: Not on file    Transportation needs:     Medical: Not on file     Non-medical: Not on file   Tobacco Use    Smoking status: Current Every Day Smoker     Packs/day: 1.50     Years: 40.00     Pack years: 60.00     Start date: 1/1/1971    Smokeless tobacco: Never Used   Substance and Sexual Activity    Alcohol use: No     Comment: denies use    Drug use: No    Sexual activity: Not Currently   Lifestyle    Physical activity:     Days per week: Not on file     Minutes per session: Not on file    Stress: Not on file   Relationships    Social connections:      Talks on phone: Not on file     Gets together: Not on file     Attends Jew service: Not on file     Active member of club or organization: Not on file     Attends meetings of clubs or organizations: Not on file     Relationship status: Not on file   Other Topics Concern    Not on file   Social History Narrative    Currently on disability for fibromyalgia.         1 step son and 1 son.       Family History   Problem Relation Age of Onset    Arthritis Mother     Kidney disease Mother     Depression Mother     Diabetes Mother     Early death Mother     Hypertension Mother     Mental illness Mother     Miscarriages / Stillbirths Mother     Stroke Sister     Stroke Brother     Hyperlipidemia Brother     Stroke Sister     Alcohol abuse Brother     Liver disease Brother     Thrombophlebitis Other     Cancer Sister 45        breast     Arthritis Sister     Heart disease Maternal Grandfather        Review of Systems   Constitutional: Positive for unexpected weight change (weight gain). Negative for appetite change and fever.   HENT: Negative for postnasal drip, rhinorrhea, sneezing, sore throat and trouble swallowing.    Eyes: Negative for visual disturbance.   Respiratory: Negative for cough, shortness of breath and wheezing.    Cardiovascular: Negative for chest pain, palpitations and leg swelling.   Gastrointestinal: Positive for abdominal distention and constipation. Negative for abdominal pain, blood in stool, diarrhea, nausea and vomiting.   Genitourinary: Negative for dysuria.   Musculoskeletal: Negative for arthralgias, joint swelling and myalgias.   Skin: Negative for color change, pallor and rash.   Neurological: Negative for weakness, light-headedness, numbness and headaches.   Hematological: Negative for adenopathy. Does not bruise/bleed easily.   Psychiatric/Behavioral: Negative for agitation.           Objective:   Vitals:   Vitals:    09/24/19 1301   BP: (!) 154/74   Pulse: 72        Physical Exam   Constitutional: She is oriented to person, place, and time. No distress.   HENT:   Head: Normocephalic and atraumatic.   Mouth/Throat: No oropharyngeal exudate.   Eyes: Pupils are equal, round, and reactive to light. Conjunctivae and EOM are normal. Right eye exhibits no discharge. Left eye exhibits no discharge. No scleral icterus.   Neck: Normal range of motion.   Cardiovascular: Normal rate, regular rhythm and normal heart sounds. Exam reveals no gallop and no friction rub.   No murmur heard.  Pulmonary/Chest: Effort normal and breath sounds normal. No stridor. No respiratory distress. She has no wheezes. She has no rales.   Abdominal: Soft. Bowel sounds are normal. She exhibits no distension and no mass. There is no tenderness. There is no guarding.   Musculoskeletal: Normal range of motion. She exhibits no edema.   Neurological: She is alert and oriented to person, place, and time.   Skin: Skin is warm and dry. No rash noted. She is not diaphoretic. No erythema. No pallor.   Psychiatric: She has a normal mood and affect.   Vitals reviewed.          IMPRESSION     Problem List Items Addressed This Visit        GI    Chronic hepatitis C without hepatic coma - Primary    Relevant Orders    CBC auto differential    Comprehensive metabolic panel    HCV FibroSURE    Hepatitis C RNA, quantitative, PCR    Hepatitis panel, acute    Protime-INR    Toxicology screen, urine    US Abdomen Limited      Other Visit Diagnoses     Gastroesophageal reflux disease, esophagitis presence not specified        Relevant Orders    Case request GI: EGD (ESOPHAGOGASTRODUODENOSCOPY), COLONOSCOPY (Completed)    Common bile duct dilation        Relevant Orders    US Abdomen Limited    Encounter for screening colonoscopy        Relevant Orders    Case request GI: EGD (ESOPHAGOGASTRODUODENOSCOPY), COLONOSCOPY (Completed)    Abdominal bloating        Drug-induced constipation              PLANS:    - Last HCV labs were  "drawn in 2017  - Will re-check HCV RNA, hepatitis panel. No need to re-check genotype  - CBC, CMP, INR and UDS  - Fibrosure and RUQ US to r/o cirrhosis and can evaluate history of biliary dilation at the same time  - Pending labs, will vaccinate with Twinrix if not already completed  - Schedule for EGD for evaluation of GERD, abdominal bloating and colonoscopy for screening purposes  - Will discontinue Protonix, as patient is not taking. Start Nexium 40 mg po daily  - Reflux precautions, weight loss  - Will start Colace 100 mg po daily for constipation while patient is on pain medication. There was an allergy warning which came up for red dye and patient states she is not allergic to "pink" medications but only "red" medications. She wanted to proceed with the prescription with plans to speak to the pharmacist about it prior to taking. Offered Miralax instead but patient does not want a powder stool softener  - RTC in 3 months for f/u labs and imaging for possible referral for treatment    Chronic hepatitis C without hepatic coma  -     CBC auto differential; Future; Expected date: 09/24/2019  -     Comprehensive metabolic panel; Future; Expected date: 09/24/2019  -     HCV FibroSURE; Future; Expected date: 09/24/2019  -     Hepatitis C RNA, quantitative, PCR; Future; Expected date: 09/24/2019  -     Hepatitis panel, acute; Future; Expected date: 09/24/2019  -     Protime-INR; Future; Expected date: 09/24/2019  -     Toxicology screen, urine  -     US Abdomen Limited    Gastroesophageal reflux disease, esophagitis presence not specified  -     Case request GI: EGD (ESOPHAGOGASTRODUODENOSCOPY), COLONOSCOPY    Common bile duct dilation  -     US Abdomen Limited    Encounter for screening colonoscopy  -     Case request GI: EGD (ESOPHAGOGASTRODUODENOSCOPY), COLONOSCOPY    Abdominal bloating    Drug-induced constipation    Other orders  -     esomeprazole (NEXIUM PACKET) 40 mg GrPS; Take 40 mg by mouth before breakfast. "  Dispense: 1200 mg; Refill: 5  -     docusate sodium (COLACE) 100 MG capsule; Take 1 capsule (100 mg total) by mouth once daily.  Dispense: 30 capsule; Refill: 5            Caitlin Johansen MD  Gastroenterology and Hepatology

## 2019-09-30 ENCOUNTER — OFFICE VISIT (OUTPATIENT)
Dept: OPHTHALMOLOGY | Facility: CLINIC | Age: 64
End: 2019-09-30
Payer: MEDICARE

## 2019-09-30 DIAGNOSIS — H52.4 BILATERAL PRESBYOPIA: ICD-10-CM

## 2019-09-30 DIAGNOSIS — H25.13 CATARACT, NUCLEAR SCLEROTIC SENILE, BILATERAL: Primary | ICD-10-CM

## 2019-09-30 PROCEDURE — 92015 PR REFRACTION: ICD-10-PCS | Mod: ,,, | Performed by: OPTOMETRIST

## 2019-09-30 PROCEDURE — 99999 PR PBB SHADOW E&M-EST. PATIENT-LVL I: ICD-10-PCS | Mod: PBBFAC,,, | Performed by: OPTOMETRIST

## 2019-09-30 PROCEDURE — 92004 COMPRE OPH EXAM NEW PT 1/>: CPT | Mod: S$PBB,,, | Performed by: OPTOMETRIST

## 2019-09-30 PROCEDURE — 92004 PR EYE EXAM, NEW PATIENT,COMPREHESV: ICD-10-PCS | Mod: S$PBB,,, | Performed by: OPTOMETRIST

## 2019-09-30 PROCEDURE — 99999 PR PBB SHADOW E&M-EST. PATIENT-LVL I: CPT | Mod: PBBFAC,,, | Performed by: OPTOMETRIST

## 2019-09-30 PROCEDURE — 99211 OFF/OP EST MAY X REQ PHY/QHP: CPT | Mod: PBBFAC | Performed by: OPTOMETRIST

## 2019-09-30 PROCEDURE — 92015 DETERMINE REFRACTIVE STATE: CPT | Mod: ,,, | Performed by: OPTOMETRIST

## 2019-10-11 ENCOUNTER — TELEPHONE (OUTPATIENT)
Dept: SMOKING CESSATION | Facility: CLINIC | Age: 64
End: 2019-10-11

## 2019-10-25 ENCOUNTER — TELEPHONE (OUTPATIENT)
Dept: GASTROENTEROLOGY | Facility: CLINIC | Age: 64
End: 2019-10-25

## 2019-10-25 NOTE — TELEPHONE ENCOUNTER
Patient called and wanted her Ultrasound of her abdomen and Labs scheduled for 11/6/2019 appointments were scheduled.

## 2019-10-31 ENCOUNTER — TELEPHONE (OUTPATIENT)
Dept: SMOKING CESSATION | Facility: CLINIC | Age: 64
End: 2019-10-31

## 2019-11-04 ENCOUNTER — TELEPHONE (OUTPATIENT)
Dept: SMOKING CESSATION | Facility: CLINIC | Age: 64
End: 2019-11-04

## 2019-11-04 NOTE — TELEPHONE ENCOUNTER
2 attempt. Missed intake.  Left message to  to call Elaina Rasmussen at [phone] or [phone] to reschedule or answer questions.

## 2019-11-05 ENCOUNTER — TELEPHONE (OUTPATIENT)
Dept: GASTROENTEROLOGY | Facility: CLINIC | Age: 64
End: 2019-11-05

## 2019-11-05 NOTE — TELEPHONE ENCOUNTER
----- Message from Elaina Sam sent at 11/5/2019  7:01 AM CST -----  Contact: Patient  Patient needs to reschedule her labs and US, but would like Ms Renhannah Anderson to do it, please call her back at [phone]. Thank you

## 2019-11-05 NOTE — TELEPHONE ENCOUNTER
Patient to be out of town and wanted to reschedule her appointments, Appointments reschedule and patient very Appreciative.

## 2019-11-06 ENCOUNTER — TELEPHONE (OUTPATIENT)
Dept: SMOKING CESSATION | Facility: CLINIC | Age: 64
End: 2019-11-06

## 2019-11-06 NOTE — TELEPHONE ENCOUNTER
3 attempt. Missed intake.  Left message to  to call Elaina Rasmussen at [phone] or [phone] to reschedule or answer questions. Will let patient call us.

## 2019-11-08 ENCOUNTER — LAB VISIT (OUTPATIENT)
Dept: LAB | Facility: HOSPITAL | Age: 64
End: 2019-11-08
Attending: INTERNAL MEDICINE
Payer: MEDICARE

## 2019-11-08 DIAGNOSIS — B18.2 CHRONIC HEPATITIS C WITHOUT HEPATIC COMA: ICD-10-CM

## 2019-11-08 LAB
ALBUMIN SERPL BCP-MCNC: 4 G/DL (ref 3.5–5.2)
ALP SERPL-CCNC: 87 U/L (ref 55–135)
ALT SERPL W/O P-5'-P-CCNC: 19 U/L (ref 10–44)
ANION GAP SERPL CALC-SCNC: 10 MMOL/L (ref 8–16)
AST SERPL-CCNC: 19 U/L (ref 10–40)
BASOPHILS # BLD AUTO: 0.02 K/UL (ref 0–0.2)
BASOPHILS NFR BLD: 0.5 % (ref 0–1.9)
BILIRUB SERPL-MCNC: 0.2 MG/DL (ref 0.1–1)
BUN SERPL-MCNC: 9 MG/DL (ref 8–23)
CALCIUM SERPL-MCNC: 8.6 MG/DL (ref 8.7–10.5)
CHLORIDE SERPL-SCNC: 96 MMOL/L (ref 95–110)
CO2 SERPL-SCNC: 24 MMOL/L (ref 23–29)
CREAT SERPL-MCNC: 0.8 MG/DL (ref 0.5–1.4)
DIFFERENTIAL METHOD: ABNORMAL
EOSINOPHIL # BLD AUTO: 0 K/UL (ref 0–0.5)
EOSINOPHIL NFR BLD: 0.3 % (ref 0–8)
ERYTHROCYTE [DISTWIDTH] IN BLOOD BY AUTOMATED COUNT: 13.2 % (ref 11.5–14.5)
EST. GFR  (AFRICAN AMERICAN): >60 ML/MIN/1.73 M^2
EST. GFR  (NON AFRICAN AMERICAN): >60 ML/MIN/1.73 M^2
GLUCOSE SERPL-MCNC: 95 MG/DL (ref 70–110)
HCT VFR BLD AUTO: 36.2 % (ref 37–48.5)
HGB BLD-MCNC: 11.7 G/DL (ref 12–16)
IMM GRANULOCYTES # BLD AUTO: 0.01 K/UL (ref 0–0.04)
IMM GRANULOCYTES NFR BLD AUTO: 0.3 % (ref 0–0.5)
INR PPP: 0.9 (ref 0.8–1.2)
LYMPHOCYTES # BLD AUTO: 1.5 K/UL (ref 1–4.8)
LYMPHOCYTES NFR BLD: 38.5 % (ref 18–48)
MCH RBC QN AUTO: 31.1 PG (ref 27–31)
MCHC RBC AUTO-ENTMCNC: 32.3 G/DL (ref 32–36)
MCV RBC AUTO: 96 FL (ref 82–98)
MONOCYTES # BLD AUTO: 0.4 K/UL (ref 0.3–1)
MONOCYTES NFR BLD: 11.7 % (ref 4–15)
NEUTROPHILS # BLD AUTO: 1.8 K/UL (ref 1.8–7.7)
NEUTROPHILS NFR BLD: 48.7 % (ref 38–73)
NRBC BLD-RTO: 0 /100 WBC
PLATELET # BLD AUTO: 196 K/UL (ref 150–350)
PMV BLD AUTO: 10.1 FL (ref 9.2–12.9)
POTASSIUM SERPL-SCNC: 3.6 MMOL/L (ref 3.5–5.1)
PROT SERPL-MCNC: 6.8 G/DL (ref 6–8.4)
PROTHROMBIN TIME: 9.4 SEC (ref 9–12.5)
RBC # BLD AUTO: 3.76 M/UL (ref 4–5.4)
SODIUM SERPL-SCNC: 130 MMOL/L (ref 136–145)
WBC # BLD AUTO: 3.77 K/UL (ref 3.9–12.7)

## 2019-11-08 PROCEDURE — 85610 PROTHROMBIN TIME: CPT

## 2019-11-08 PROCEDURE — 36415 COLL VENOUS BLD VENIPUNCTURE: CPT

## 2019-11-08 PROCEDURE — 87522 HEPATITIS C REVRS TRNSCRPJ: CPT

## 2019-11-08 PROCEDURE — 85025 COMPLETE CBC W/AUTO DIFF WBC: CPT

## 2019-11-08 PROCEDURE — 80074 ACUTE HEPATITIS PANEL: CPT

## 2019-11-08 PROCEDURE — 80053 COMPREHEN METABOLIC PANEL: CPT

## 2019-11-11 ENCOUNTER — OFFICE VISIT (OUTPATIENT)
Dept: INTERNAL MEDICINE | Facility: CLINIC | Age: 64
End: 2019-11-11
Payer: MEDICARE

## 2019-11-11 ENCOUNTER — TELEPHONE (OUTPATIENT)
Dept: INTERNAL MEDICINE | Facility: CLINIC | Age: 64
End: 2019-11-11

## 2019-11-11 VITALS
DIASTOLIC BLOOD PRESSURE: 58 MMHG | WEIGHT: 183.19 LBS | OXYGEN SATURATION: 96 % | BODY MASS INDEX: 28.75 KG/M2 | SYSTOLIC BLOOD PRESSURE: 122 MMHG | HEIGHT: 67 IN | TEMPERATURE: 98 F | HEART RATE: 90 BPM

## 2019-11-11 DIAGNOSIS — I10 ESSENTIAL HYPERTENSION: ICD-10-CM

## 2019-11-11 DIAGNOSIS — R05.9 COUGH: Primary | ICD-10-CM

## 2019-11-11 DIAGNOSIS — J06.9 VIRAL UPPER RESPIRATORY ILLNESS: ICD-10-CM

## 2019-11-11 LAB
HAV IGM SERPL QL IA: NEGATIVE
HBV CORE IGM SERPL QL IA: NEGATIVE
HBV SURFACE AG SERPL QL IA: NEGATIVE
HCV AB SERPL QL IA: POSITIVE
HCV RNA SERPL NAA+PROBE-LOG IU: 2.35 LOG (10) IU/ML
HCV RNA SERPL QL NAA+PROBE: DETECTED IU/ML
HCV RNA SPEC NAA+PROBE-ACNC: 222 IU/ML

## 2019-11-11 PROCEDURE — 99213 OFFICE O/P EST LOW 20 MIN: CPT | Mod: S$PBB,,, | Performed by: FAMILY MEDICINE

## 2019-11-11 PROCEDURE — 96372 THER/PROPH/DIAG INJ SC/IM: CPT | Mod: PBBFAC

## 2019-11-11 PROCEDURE — 99215 OFFICE O/P EST HI 40 MIN: CPT | Mod: PBBFAC | Performed by: FAMILY MEDICINE

## 2019-11-11 PROCEDURE — 99213 PR OFFICE/OUTPT VISIT, EST, LEVL III, 20-29 MIN: ICD-10-PCS | Mod: S$PBB,,, | Performed by: FAMILY MEDICINE

## 2019-11-11 PROCEDURE — 99999 PR PBB SHADOW E&M-EST. PATIENT-LVL V: ICD-10-PCS | Mod: PBBFAC,,, | Performed by: FAMILY MEDICINE

## 2019-11-11 PROCEDURE — 99999 PR PBB SHADOW E&M-EST. PATIENT-LVL V: CPT | Mod: PBBFAC,,, | Performed by: FAMILY MEDICINE

## 2019-11-11 RX ORDER — BENZONATATE 200 MG/1
200 CAPSULE ORAL 3 TIMES DAILY PRN
Qty: 60 CAPSULE | Refills: 0 | Status: SHIPPED | OUTPATIENT
Start: 2019-11-11 | End: 2019-11-12 | Stop reason: SDUPTHER

## 2019-11-11 RX ORDER — BETAMETHASONE SODIUM PHOSPHATE AND BETAMETHASONE ACETATE 3; 3 MG/ML; MG/ML
9 INJECTION, SUSPENSION INTRA-ARTICULAR; INTRALESIONAL; INTRAMUSCULAR; SOFT TISSUE
Status: COMPLETED | OUTPATIENT
Start: 2019-11-11 | End: 2019-11-11

## 2019-11-11 RX ORDER — TRIAMTERENE/HYDROCHLOROTHIAZID 37.5-25 MG
1 TABLET ORAL DAILY
Qty: 90 TABLET | Refills: 1 | Status: SHIPPED | OUTPATIENT
Start: 2019-11-11 | End: 2020-01-28 | Stop reason: SDUPTHER

## 2019-11-11 RX ORDER — LISINOPRIL 20 MG/1
20 TABLET ORAL DAILY
Qty: 90 TABLET | Refills: 1 | Status: SHIPPED | OUTPATIENT
Start: 2019-11-11 | End: 2020-01-28 | Stop reason: SDUPTHER

## 2019-11-11 RX ADMIN — BETAMETHASONE ACETATE AND BETAMETHASONE SODIUM PHOSPHATE 9 MG: 3; 3 INJECTION, SUSPENSION INTRA-ARTICULAR; INTRALESIONAL; INTRAMUSCULAR; SOFT TISSUE at 02:11

## 2019-11-11 NOTE — PROGRESS NOTES
Luz Marina Moses  2019  [id number]    Navjot Baig MD  Patient Care Team:  Navjot Baig MD as PCP - General (Family Medicine)  Parth Shook MD as PCP - MSSP Attributed  Deon Rucker MD as Consulting Physician (Pain Medicine)        Chief Complaint:  Chief Complaint   Patient presents with    Cough    Chest Congestion    Sinus Problem    Headache       History of Present Illness:   Luz Marina Moses 64 y.o. female presents today with one-week history of severe nonproductive cough and myalgias as well as scratchy throat and nasal congestion and coryza    The patient states she has had fever of approximately 100 ° for a few days.  No vomiting or diarrhea.  Patient says that she cannot take the flu shot because of allergies.      History:  Past Medical History:   Diagnosis Date    Allergy     Anemia     Anxiety     Arthritis     COPD (chronic obstructive pulmonary disease)     Depression     Encounter for blood transfusion     Encounter for screening mammogram for breast cancer 2018    Fibromyalgia     currently on disability for fibromyalgia.     H/O fibromyalgia     Heart murmur     Hepatitis C 2017    Hyperlipidemia     Hypertension     IBS (irritable bowel syndrome)     Need for hepatitis C screening test 2018    Osteoarthritis     Osteoporosis     PTSD (post-traumatic stress disorder)      Past Surgical History:   Procedure Laterality Date     SECTION      FRACTURE SURGERY      HYSTERECTOMY      partial     LEG SURGERY Left 16    orif    TONSILLECTOMY       Family History   Problem Relation Age of Onset    Arthritis Mother     Kidney disease Mother     Depression Mother     Diabetes Mother     Early death Mother     Hypertension Mother     Mental illness Mother     Miscarriages / Stillbirths Mother     Stroke Sister     Stroke Brother     Hyperlipidemia Brother     Stroke Sister     Alcohol abuse Brother     Liver disease Brother      Thrombophlebitis Other     Cancer Sister 45        breast     Arthritis Sister     Heart disease Maternal Grandfather      Social History     Socioeconomic History    Marital status:      Spouse name: Not on file    Number of children: 2    Years of education: Not on file    Highest education level: Not on file   Occupational History    Occupation: Disabled-      Comment: PTSD, Archana   Social Needs    Financial resource strain: Not on file    Food insecurity:     Worry: Not on file     Inability: Not on file    Transportation needs:     Medical: Not on file     Non-medical: Not on file   Tobacco Use    Smoking status: Current Every Day Smoker     Packs/day: 1.50     Years: 40.00     Pack years: 60.00     Start date: 1/1/1971    Smokeless tobacco: Never Used   Substance and Sexual Activity    Alcohol use: No     Comment: denies use    Drug use: No    Sexual activity: Not Currently   Lifestyle    Physical activity:     Days per week: Not on file     Minutes per session: Not on file    Stress: Not on file   Relationships    Social connections:     Talks on phone: Not on file     Gets together: Not on file     Attends [employer] service: Not on file     Active member of club or organization: Not on file     Attends meetings of clubs or organizations: Not on file     Relationship status: Not on file   Other Topics Concern    Not on file   Social History Narrative    Currently on disability for fibromyalgia.         1 step son and 1 son.     Patient Active Problem List   Diagnosis    Fibromyalgia    Primary osteoarthritis involving multiple joints    Osteoporosis    IBS (irritable bowel syndrome)    Essential hypertension    PTSD (post-traumatic stress disorder)    Depression    Allergic rhinitis due to pollen    Closed bimalleolar fracture of left ankle    Bimalleolar ankle fracture    Mixed dyslipidemia    Statin myopathy    Chronic hepatitis C without hepatic coma    Tobacco  use    COPD with exacerbation    Vertigo    Simple chronic bronchitis    Anemia    Obesity (BMI 30.0-34.9)    Cough    Viral upper respiratory illness     Review of patient's allergies indicates:   Allergen Reactions    Atorvastatin     Red dye Diarrhea and Rash    Spiriva respimat [tiotropium bromide] Anaphylaxis    Augmentin [amoxicillin-pot clavulanate]     Crestor [rosuvastatin] Other (See Comments)     Chest Pain    Lodine [etodolac] Other (See Comments)     Dizziness     Xarelto [rivaroxaban]      Blister on tongue and soreness all over mouth        The following were reviewed at this visit: active problem list, medication list, allergies, family history, social history, and health maintenance.    Medications:  Current Outpatient Medications on File Prior to Visit   Medication Sig Dispense Refill    albuterol (PROVENTIL HFA) 90 mcg/actuation inhaler Inhale 2 puffs into the lungs every 6 (six) hours as needed for Wheezing. Rescue 18 g 0    alendronate (FOSAMAX) 70 MG tablet Take 1 tablet (70 mg total) by mouth every 7 days. 4 tablet 5    buPROPion (WELLBUTRIN) 75 MG tablet Take 75 mg by mouth once daily.      escitalopram oxalate (LEXAPRO) 10 MG tablet Take 10 mg by mouth Daily.      esomeprazole (NEXIUM PACKET) 40 mg GrPS Take 40 mg by mouth before breakfast. 1200 mg 5    ezetimibe (ZETIA) 10 mg tablet Take 1 tablet (10 mg total) by mouth once daily. 90 tablet 3    fluticasone propionate (FLONASE) 50 mcg/actuation nasal spray 2 sprays (100 mcg total) by Each Nare route once daily. 16 g 5    gabapentin (NEURONTIN) 600 MG tablet Take 600 mg by mouth 3 (three) times daily.      lisinopril (PRINIVIL,ZESTRIL) 20 MG tablet Take 1 tablet (20 mg total) by mouth once daily. 90 tablet 1    meclizine (ANTIVERT) 25 mg tablet Take 1 tablet (25 mg total) by mouth 3 (three) times daily as needed. 30 tablet 1    morphine (MSIR) 15 MG tablet 15 mg every 6 (six) hours as needed.       NARCAN 4  mg/actuation Spry       ondansetron (ZOFRAN) 8 MG tablet Take 1 tablet (8 mg total) by mouth every 6 to 8 hours as needed (Nausea). 30 tablet 0    salmeterol (SEREVENT) 50 mcg/dose diskus inhaler Inhale 1 puff into the lungs 2 (two) times daily. Controller 1 each 5    tiotropium (SPIRIVA) 18 mcg inhalation capsule Inhale 1 capsule (18 mcg total) into the lungs once daily. Controller 1 Box 0    tizanidine (ZANAFLEX) 4 MG tablet Take 4 mg by mouth every 6 (six) hours as needed.      traMADol (ULTRAM) 50 mg tablet Take 50 mg by mouth 2 (two) times daily.  0    triamterene-hydrochlorothiazide 37.5-25 mg (MAXZIDE-25) 37.5-25 mg per tablet Take 1 tablet by mouth once daily. 90 tablet 1     No current facility-administered medications on file prior to visit.        Medications have been reviewed and reconciled with patient at this visit.      Exam:  Wt Readings from Last 3 Encounters:   11/11/19 83.1 kg (183 lb 3.2 oz)   09/24/19 84.1 kg (185 lb 6.5 oz)   09/24/19 84.1 kg (185 lb 6.5 oz)     Temp Readings from Last 3 Encounters:   11/11/19 98.3 °F (36.8 °C) (Tympanic)   09/24/19 98 °F (36.7 °C) (Tympanic)   05/07/19 98.4 °F (36.9 °C) (Oral)     BP Readings from Last 3 Encounters:   11/11/19 (!) 122/58   09/24/19 (!) 148/79   09/24/19 (!) 154/74     Pulse Readings from Last 3 Encounters:   11/11/19 90   09/24/19 85   09/24/19 72     Body mass index is 28.69 kg/m².      Review of Systems   Constitutional: Negative for chills, fever and weight loss.   Respiratory: Negative for shortness of breath.    Cardiovascular: Negative for chest pain and palpitations.     Physical Exam  WNWD, A&O ambulatory adult female no acute distress    HEENT-ear canals clear with TMs retracted  Pharynx with mild to moderate erythema them a and edema as well as postnasal drip  No nasal discharge  Neck-supple  Heart regular rate and rhythm  Lungs clear except for few rhonchi  Psychiatric good affect cognition    The patient verbalizes good  understanding of the medical issues involved and appreciation for the care provided today.      Luz Marina was seen today for cough, chest congestion, sinus problem and headache.    Diagnoses and all orders for this visit:    Cough    Viral upper respiratory illness    Other orders  -     betamethasone acetate-betamethasone sodium phosphate injection 9 mg          I discussed the nature of the problem(s) with the patient today.  The patient was encouraged to participate in appropriate physical activity.    After visit summary was printed and given to patient upon discharge today.  Patient goals and care plan are included in After Visit Summary.    This note was produced with voice recognition software and may have sound a like errors

## 2019-11-11 NOTE — TELEPHONE ENCOUNTER
----- Message from Tino Lopez sent at 11/11/2019  2:43 PM CST -----  Contact: Pt   States she's calling to let the Dr know that her insurance doesn't cover tessalon pearls and wants to discuss and can be reached at [phone]//thanks/dbw    Pt disconnected while typing the message

## 2019-11-11 NOTE — PATIENT INSTRUCTIONS
Flonase over-the-counter nasal spray twice a day for 2 weeks    Hydrate well    Take benzonatate 3 times a day to suppress cough    Advil or Tylenol to 3 times a day as needed for muscle pain

## 2019-11-12 LAB
A2 MACROGLOB SERPL-MCNC: 166 MG/DL (ref 100–280)
ALT SERPL W P-5'-P-CCNC: 15 U/L (ref 7–45)
APO A-I SERPL-MCNC: 103 MG/DL
BILIRUB SERPL-MCNC: <0.2 MG/DL
BIOPREDICTIVE SERIAL NUMBER: ABNORMAL
FIBROSIS STAGE SERPL QL: ABNORMAL
FIBROTEST INTERPRETATION: ABNORMAL
FIBROTEST-ACTITEST COMMENT: ABNORMAL
GGT SERPL-CCNC: 18 U/L (ref 5–36)
HAPTOGLOB SERPL-MCNC: 157 MG/DL (ref 30–200)
LIVER FIBR SCORE SERPL CALC.FIBROSURE: 0.12
NECROINFLAMMAT INTERP: ABNORMAL
NECROINFLAMMATORY ACT GRADE SERPL QL: ABNORMAL
NECROINFLAMMATORY ACT SCORE SERPL: 0.04

## 2019-11-12 RX ORDER — BENZONATATE 200 MG/1
200 CAPSULE ORAL 3 TIMES DAILY PRN
Qty: 60 CAPSULE | Refills: 0 | Status: SHIPPED | OUTPATIENT
Start: 2019-11-12 | End: 2019-12-02

## 2019-11-12 NOTE — TELEPHONE ENCOUNTER
Patient notified and states she would like the prescription sent to our pharmacy and pay out of pocket. Please send to [facility].

## 2019-11-12 NOTE — TELEPHONE ENCOUNTER
Patient notified of 's recommendation to try OTC Robitussin DM. Patient states she is unable to use this and other OTC cough meds due to being allergic to red dye. Patient states she has taken a prescription medication in the past that has helped but can not remember the name. Please advise of any other recommendations.

## 2019-11-14 ENCOUNTER — TELEPHONE (OUTPATIENT)
Dept: RADIOLOGY | Facility: HOSPITAL | Age: 64
End: 2019-11-14

## 2019-11-15 ENCOUNTER — HOSPITAL ENCOUNTER (OUTPATIENT)
Dept: RADIOLOGY | Facility: HOSPITAL | Age: 64
Discharge: HOME | End: 2019-11-15
Attending: INTERNAL MEDICINE
Payer: MEDICARE

## 2019-11-15 PROCEDURE — 76705 US ABDOMEN LIMITED: ICD-10-PCS | Mod: 26,,, | Performed by: RADIOLOGY

## 2019-11-15 PROCEDURE — 76705 ECHO EXAM OF ABDOMEN: CPT | Mod: TC

## 2019-11-15 PROCEDURE — 76705 ECHO EXAM OF ABDOMEN: CPT | Mod: 26,,, | Performed by: RADIOLOGY

## 2019-11-18 ENCOUNTER — TELEPHONE (OUTPATIENT)
Dept: ENDOSCOPY | Facility: HOSPITAL | Age: 64
End: 2019-11-18

## 2019-11-18 NOTE — TELEPHONE ENCOUNTER
Attempted to confirm removal from schedule with patient, no answer. Left message to call office, number provided.

## 2019-11-18 NOTE — TELEPHONE ENCOUNTER
----- Message from Pricilla Blake sent at 11/18/2019  9:44 AM CST -----  Contact: Self  Patient requesting to have 12/09/19 appointment cancelled. She will call back to reschedule. If needed please call patient back at [phone]

## 2019-12-03 ENCOUNTER — TELEPHONE (OUTPATIENT)
Dept: ENDOSCOPY | Facility: HOSPITAL | Age: 64
End: 2019-12-03

## 2019-12-03 NOTE — TELEPHONE ENCOUNTER
Patient called office to cancel procedures scheduled for 12-9-2019.  She stated there was an unexpected death in the family-her grandson and she could not have procedures done at this time.  Patient stated she would call office to reschedule procedures when ready.

## 2019-12-11 ENCOUNTER — TELEPHONE (OUTPATIENT)
Dept: FAMILY MEDICINE | Facility: CLINIC | Age: 64
End: 2019-12-11

## 2019-12-11 NOTE — TELEPHONE ENCOUNTER
Patient notified and verbalized understanding in regards to her labs. She states that she is going to [facility] in [place] to get them completed. She stated that last time she got lab work done that they did not see any orders. Explained to patient that we are in the same system as [facility] and they can see our orders. Patient verbalized understanding. Still requested that we pend the orders to go just in case. Orders have been pended.

## 2019-12-17 ENCOUNTER — TELEPHONE (OUTPATIENT)
Dept: GASTROENTEROLOGY | Facility: CLINIC | Age: 64
End: 2019-12-17

## 2019-12-17 DIAGNOSIS — R93.2 ABNORMAL FINDINGS ON DIAGNOSTIC IMAGING OF LIVER AND BILIARY TRACT: ICD-10-CM

## 2019-12-17 DIAGNOSIS — K83.8 ACQUIRED DILATION OF BILE DUCT: Primary | ICD-10-CM

## 2019-12-17 NOTE — TELEPHONE ENCOUNTER
----- Message from Caitlin Johansen MD sent at 12/17/2019 10:27 AM CST -----  Patient's liver ultrasound showed some changes of chronic hepatitis but no liver lesions. She does have some dilation of her common hepatic duct on the imaging without any evidence of stones. This should be further evaluated with an MRCP (MRI of the bile ducts). I will place the order. Please call patient and inform of results.

## 2019-12-19 ENCOUNTER — OFFICE VISIT (OUTPATIENT)
Dept: GASTROENTEROLOGY | Facility: CLINIC | Age: 64
End: 2019-12-19
Payer: MEDICARE

## 2019-12-19 VITALS
HEIGHT: 67 IN | WEIGHT: 181.19 LBS | DIASTOLIC BLOOD PRESSURE: 68 MMHG | BODY MASS INDEX: 28.44 KG/M2 | SYSTOLIC BLOOD PRESSURE: 144 MMHG | OXYGEN SATURATION: 96 % | HEART RATE: 88 BPM

## 2019-12-19 DIAGNOSIS — B18.2 CHRONIC HEPATITIS C WITHOUT HEPATIC COMA: Primary | ICD-10-CM

## 2019-12-19 DIAGNOSIS — R93.3 ABNORMAL FINDING ON GI TRACT IMAGING: ICD-10-CM

## 2019-12-19 PROCEDURE — 99214 PR OFFICE/OUTPT VISIT, EST, LEVL IV, 30-39 MIN: ICD-10-PCS | Mod: S$PBB,,, | Performed by: INTERNAL MEDICINE

## 2019-12-19 PROCEDURE — 99213 OFFICE O/P EST LOW 20 MIN: CPT | Mod: PBBFAC | Performed by: INTERNAL MEDICINE

## 2019-12-19 PROCEDURE — 99214 OFFICE O/P EST MOD 30 MIN: CPT | Mod: S$PBB,,, | Performed by: INTERNAL MEDICINE

## 2019-12-19 PROCEDURE — 99999 PR PBB SHADOW E&M-EST. PATIENT-LVL III: ICD-10-PCS | Mod: PBBFAC,,, | Performed by: INTERNAL MEDICINE

## 2019-12-19 PROCEDURE — 99999 PR PBB SHADOW E&M-EST. PATIENT-LVL III: CPT | Mod: PBBFAC,,, | Performed by: INTERNAL MEDICINE

## 2019-12-19 NOTE — PROGRESS NOTES
"[facility]  Gastroenterology    PCP: Navjot Baig MD    12/19/19    HPI     Follow-up      Additional comments: rev US and lab          Last edited by Lorena Abebe LPN on 12/19/2019  1:44 PM. (History)      Reason for Visit: Follow-up chronic HCV and biliary ductal dilation    Brief HPI: Luz Marina Moses is a 64 y.o. female with history of HTN, HLD, COPD, anxiety/depression who initially presented for evaluation of chronic HCV. Patient had a screening HCV Ab drawn which was positive in 2017. She had subsequent labs drawn which showed a genotype 1a with a viral load of > 3.6 million RNA copies. Her US abdomen was done at [facility] and was negative for cirrhosis but did show CBD dilation of 1.9 cm without any intrahepatic biliary dilation. It was recommended she go for "another test" but patient failed to follow-up. Her past liver chemistries have been normal. As for how she got the HCV, patient believes it was through a blood transfusion in 1982. She denies tattoos, IV drug use or intranasal cocaine use.      Patient also reports GERD and abdominal bloating. She was previously taking Protonix but this hasn't worked for her. She prefers Nexium and is requesting this in the clinic today. She has been having reflux symptoms for the past 1-2 years. She has never had an EGD before. Her abdominal bloating is intermittent. No N/V or weight loss. In fact, patient has had a 40 lb weight gain since being put on Prozac.     Patient has never had a colonoscopy. It was scheduled twice in the past but she cancelled it. She denies any blood in stool. She has some issues with constipation but sees pain therapy and is on chronic pain medication with Morphine. She is not currently taking any stools softener. She denies any FH of colon cancer.     Subjective:   Interval HPI: Labs completed. Patient still with presence of virus in blood with viral load of 222. Fibrosure is F0 and liver ultrasound negative for " cirrhosis or liver lesions. Patient's ultrasound did show common hepatic duct dilation of 1.7 cm for which MRCP was ordered for further evaluation but has not yet been completed. Patient was scheduled for EGD for complaints of GERD and abdominal bloating and a screening colonoscopy but this was not completed either.       Past Medical History:   Diagnosis Date    Allergy     Anemia     Anxiety     Arthritis     COPD (chronic obstructive pulmonary disease)     Depression     Encounter for blood transfusion     Encounter for screening mammogram for breast cancer 2018    Fibromyalgia     currently on disability for fibromyalgia.     H/O fibromyalgia     Heart murmur     Hepatitis C 2017    Hyperlipidemia     Hypertension     IBS (irritable bowel syndrome)     Need for hepatitis C screening test 2018    Osteoarthritis     Osteoporosis     PTSD (post-traumatic stress disorder)        Past Surgical History:   Procedure Laterality Date     SECTION      FRACTURE SURGERY      HYSTERECTOMY      partial     LEG SURGERY Left 16    orif    TONSILLECTOMY         Current Outpatient Medications on File Prior to Visit   Medication Sig Dispense Refill    albuterol (PROVENTIL HFA) 90 mcg/actuation inhaler Inhale 2 puffs into the lungs every 6 (six) hours as needed for Wheezing. Rescue 18 g 0    alendronate (FOSAMAX) 70 MG tablet Take 1 tablet (70 mg total) by mouth every 7 days. 4 tablet 5    buPROPion (WELLBUTRIN) 75 MG tablet Take 75 mg by mouth once daily.      ezetimibe (ZETIA) 10 mg tablet Take 1 tablet (10 mg total) by mouth once daily. 90 tablet 3    fluticasone propionate (FLONASE) 50 mcg/actuation nasal spray 2 sprays (100 mcg total) by Each Nare route once daily. 16 g 5    gabapentin (NEURONTIN) 600 MG tablet Take 600 mg by mouth 3 (three) times daily.      lisinopril (PRINIVIL,ZESTRIL) 20 MG tablet Take 1 tablet (20 mg total) by mouth once daily. 90 tablet 1     meclizine (ANTIVERT) 25 mg tablet Take 1 tablet (25 mg total) by mouth 3 (three) times daily as needed. 30 tablet 1    morphine (MSIR) 15 MG tablet 15 mg every 6 (six) hours as needed.       ondansetron (ZOFRAN) 8 MG tablet Take 1 tablet (8 mg total) by mouth every 6 to 8 hours as needed (Nausea). 30 tablet 0    salmeterol (SEREVENT) 50 mcg/dose diskus inhaler Inhale 1 puff into the lungs 2 (two) times daily. Controller 1 each 5    tiotropium (SPIRIVA) 18 mcg inhalation capsule Inhale 1 capsule (18 mcg total) into the lungs once daily. Controller 1 Box 0    tizanidine (ZANAFLEX) 4 MG tablet Take 4 mg by mouth every 6 (six) hours as needed.      triamterene-hydrochlorothiazide 37.5-25 mg (MAXZIDE-25) 37.5-25 mg per tablet Take 1 tablet by mouth once daily. 90 tablet 1    escitalopram oxalate (LEXAPRO) 10 MG tablet Take 10 mg by mouth Daily.      esomeprazole (NEXIUM PACKET) 40 mg GrPS Take 40 mg by mouth before breakfast. (Patient not taking: Reported on 12/19/2019) 1200 mg 5    NARCAN 4 mg/actuation Spry       traMADol (ULTRAM) 50 mg tablet Take 50 mg by mouth 2 (two) times daily.  0     No current facility-administered medications on file prior to visit.        Review of patient's allergies indicates:   Allergen Reactions    Atorvastatin     Red dye Diarrhea and Rash    Spiriva respimat [tiotropium bromide] Anaphylaxis    Augmentin [amoxicillin-pot clavulanate]     Crestor [rosuvastatin] Other (See Comments)     Chest Pain    Lodine [etodolac] Other (See Comments)     Dizziness     Xarelto [rivaroxaban]      Blister on tongue and soreness all over mouth        Social History     Socioeconomic History    Marital status:      Spouse name: Not on file    Number of children: 2    Years of education: Not on file    Highest education level: Not on file   Occupational History    Occupation: Disabled-      Comment: PTSD, Archana   Social Needs    Financial resource strain: Not on file     Food insecurity:     Worry: Not on file     Inability: Not on file    Transportation needs:     Medical: Not on file     Non-medical: Not on file   Tobacco Use    Smoking status: Current Every Day Smoker     Packs/day: 1.50     Years: 40.00     Pack years: 60.00     Start date: 1/1/1971    Smokeless tobacco: Never Used   Substance and Sexual Activity    Alcohol use: No     Comment: denies use    Drug use: No    Sexual activity: Not Currently   Lifestyle    Physical activity:     Days per week: Not on file     Minutes per session: Not on file    Stress: Not on file   Relationships    Social connections:     Talks on phone: Not on file     Gets together: Not on file     Attends [employer] service: Not on file     Active member of club or organization: Not on file     Attends meetings of clubs or organizations: Not on file     Relationship status: Not on file   Other Topics Concern    Not on file   Social History Narrative    Currently on disability for fibromyalgia.         1 step son and 1 son.       Family History   Problem Relation Age of Onset    Arthritis Mother     Kidney disease Mother     Depression Mother     Diabetes Mother     Early death Mother     Hypertension Mother     Mental illness Mother     Miscarriages / Stillbirths Mother     Stroke Sister     Stroke Brother     Hyperlipidemia Brother     Stroke Sister     Alcohol abuse Brother     Liver disease Brother     Thrombophlebitis Other     Cancer Sister 45        breast     Arthritis Sister     Heart disease Maternal Grandfather        Review of Systems   Constitutional: Negative for appetite change, fever and unexpected weight change.   HENT: Negative for postnasal drip, rhinorrhea, sneezing, sore throat and trouble swallowing.    Eyes: Negative for visual disturbance.   Respiratory: Negative for cough, shortness of breath and wheezing.    Cardiovascular: Negative for chest pain, palpitations and leg swelling.    Gastrointestinal: Negative for abdominal pain, blood in stool, constipation, diarrhea, nausea and vomiting.   Genitourinary: Negative for dysuria.   Musculoskeletal: Negative for arthralgias, joint swelling and myalgias.   Skin: Negative for color change, pallor and rash.   Neurological: Negative for weakness, light-headedness, numbness and headaches.   Hematological: Negative for adenopathy. Does not bruise/bleed easily.   Psychiatric/Behavioral: Negative for agitation.           Objective:   Vitals:   Vitals:    12/19/19 1343   BP: (!) 144/68   Pulse: 88       Physical Exam   Constitutional: She is oriented to person, place, and time. No distress.   HENT:   Head: Normocephalic and atraumatic.   Mouth/Throat: No oropharyngeal exudate.   Eyes: Pupils are equal, round, and reactive to light. Conjunctivae and EOM are normal. Right eye exhibits no discharge. Left eye exhibits no discharge. No scleral icterus.   Neck: Normal range of motion.   Cardiovascular: Normal rate, regular rhythm and normal heart sounds. Exam reveals no gallop and no friction rub.   No murmur heard.  Pulmonary/Chest: Effort normal and breath sounds normal. No stridor. No respiratory distress. She has no wheezes. She has no rales.   Abdominal: Soft. Bowel sounds are normal. She exhibits no distension and no mass. There is no tenderness. There is no guarding.   Musculoskeletal: Normal range of motion. She exhibits no edema.   Neurological: She is alert and oriented to person, place, and time.   Skin: Skin is warm and dry. No rash noted. She is not diaphoretic. No erythema. No pallor.   Psychiatric: She has a normal mood and affect.   Vitals reviewed.    Imaging:     US Liver 9/24/19:     Liver is normal in size with minimally heterogeneous echotexture.  This can be seen with hepatitis.  No discrete cystic or solid lesion identified.  There is note made of 8 mm calcification within the left lobe possibly representing prior granulomatous  disease.    Prominent common hepatic duct without evidence of choledocholithiasis.  Duct measures 1.7 cm.    No other significant findings.  Follow-up and/or further evaluation as warranted.    IMPRESSION     Problem List Items Addressed This Visit        GI    Chronic hepatitis C without hepatic coma - Primary      Other Visit Diagnoses     Abnormal finding on GI tract imaging              PLANS:    - Patient with chronic HCV genotype 1a with viral load of 222  - US liver negative for cirrhosis, Fibrosure is F0  - UDS and labs completed  - Plan for referral for Mavyret x 8 weeks   - If approved, patient will need repeat viral load at 4 weeks and at time of completion of therapy at 8 weeks. Patient can call in to have this lab work completed. She was told to call in and inform clinic once she has started her medications  - Also discussed that pharmacy and insurance may prefer a different medication. If Mavyret is not approved, will change to Epclusa and patient is agreeable with this plan  - MRCP ordered for further evaluation of prominent common hepatic duct noted on US. Will f/u results  - Patient would like to wait to schedule her EGD and colonoscopy due to recent death of grandson and needing some time   - RTC in 3 months for follow-up    Chronic hepatitis C without hepatic coma    Abnormal finding on GI tract imaging      Caitlin Johansen MD  Gastroenterology and Hepatology

## 2019-12-20 ENCOUNTER — TELEPHONE (OUTPATIENT)
Dept: PHARMACY | Facility: CLINIC | Age: 64
End: 2019-12-20

## 2019-12-26 NOTE — TELEPHONE ENCOUNTER
DOCUMENTATION ONLY:  Prior authorization for Mavyret approved from 11/23/2019 to 12/22/2020 x 8 weeks of treatment.   Case ID# [id number]     Co-pay: $8.50    Patient Assistance IS NOT required.     Forward to clinical pharmacist for consult & shipment.

## 2020-01-02 ENCOUNTER — TELEPHONE (OUTPATIENT)
Dept: PHARMACY | Facility: CLINIC | Age: 65
End: 2020-01-02

## 2020-01-02 NOTE — TELEPHONE ENCOUNTER
[facility] - scheduled on 1/7 at 9:30 am (phone).

## 2020-01-06 DIAGNOSIS — J41.0 SIMPLE CHRONIC BRONCHITIS: ICD-10-CM

## 2020-01-06 RX ORDER — ALBUTEROL SULFATE 90 UG/1
2 AEROSOL, METERED RESPIRATORY (INHALATION) EVERY 6 HOURS PRN
Qty: 18 G | Refills: 0 | Status: SHIPPED | OUTPATIENT
Start: 2020-01-06 | End: 2020-01-28 | Stop reason: SDUPTHER

## 2020-01-07 NOTE — TELEPHONE ENCOUNTER
Initial Mavyret Consultation attempted at 9:36 am (attempt 1 - left note open in case of call back). NA, LVM for call back. Will f/u if no call back.   - Initial consult scheduled for today, 1/7 at 9:30 am.

## 2020-01-10 NOTE — TELEPHONE ENCOUNTER
Initial Mavyret Consultation attempted (attempt 2). NA, LVM for call back. Will f/u if no call back.   - Initial consult scheduled on 1/7 at 9:30 am - NA.

## 2020-01-16 NOTE — TELEPHONE ENCOUNTER
Patient called for initial consult and ship shady lamb.     Sree Hester, CHUCK.Ph., AAHIVP  Clinical Pharmacist, HIV/HCV  [facility]  Phone: [phone]

## 2020-01-20 ENCOUNTER — PATIENT MESSAGE (OUTPATIENT)
Dept: GASTROENTEROLOGY | Facility: CLINIC | Age: 65
End: 2020-01-20

## 2020-01-20 NOTE — TELEPHONE ENCOUNTER
Initial Mavyret Consultation attempted (attempt 4). NA, LVM for call back. Will f/u if no call back.   - Post card sent to phone  - Portal message sent  - Provider's office message due to lack of contact

## 2020-01-24 ENCOUNTER — TELEPHONE (OUTPATIENT)
Dept: PHARMACY | Facility: CLINIC | Age: 65
End: 2020-01-24

## 2020-01-24 RX ORDER — GABAPENTIN 600 MG/1
600 TABLET ORAL 3 TIMES DAILY
COMMUNITY
End: 2020-09-23

## 2020-01-24 NOTE — TELEPHONE ENCOUNTER
Initial Mavyret consult completed on . Mavyret will be shipped on  to arrive at patient's home on  via [employer]. Patient will start Mavyret on . Address confirmed, CC on file. Confirmed 2 patient identifiers - name and . Therapy Appropriate.     Mavyret 100/40mg- Take three tablets by mouth once daily WITH FOOD x 8 weeks  Counseling was reviewed:   1. Patient MUST take Mavyret at the SAME time every day.   2. Potential Side effects include: nausea, headaches, diarrhea and fatigue.   Headache: Patient may treat with OTC remedies. If Tylenol is used, dose should not exceed 2000mg per day.    4. Medication list reviewed. No DDIs or allergies noted. Patient MUST contact myself or provider prior to starting any new OTC, herbal, or prescription drugs to avoid potential DDIs.    DDI: Medication list reviewed and potential DDIs addressed.    Discussed the importance of staying well hydrated while on therapy. Compliance stressed - patient to take missed doses as soon as remembered, but NOT to take 2 doses in one day. Patient will report questions or concerns to myself or practitioner. Patient verbalizes understanding. Patient plans to start Mavyret on .  Consultation included: indication; goals of treatment; administration; storage and handling; side effects; how to handle side effects; the importance of compliance; how to handle missed doses; the importance of laboratory monitoring; the importance of keeping all follow up appointments.  Patient understands to report any medication changes to [facility] and provider. All questions answered and addressed to patients satisfaction. F/U will occur 7-10 days from start of treatment, [facility] to contact patient in 3 weeks for refills.

## 2020-01-28 ENCOUNTER — OFFICE VISIT (OUTPATIENT)
Dept: FAMILY MEDICINE | Facility: CLINIC | Age: 65
End: 2020-01-28
Payer: MEDICARE

## 2020-01-28 VITALS
DIASTOLIC BLOOD PRESSURE: 74 MMHG | BODY MASS INDEX: 28.41 KG/M2 | HEIGHT: 67 IN | HEART RATE: 83 BPM | SYSTOLIC BLOOD PRESSURE: 134 MMHG | OXYGEN SATURATION: 99 % | WEIGHT: 181 LBS

## 2020-01-28 DIAGNOSIS — I10 ESSENTIAL HYPERTENSION: Primary | ICD-10-CM

## 2020-01-28 DIAGNOSIS — M81.0 OSTEOPOROSIS WITHOUT CURRENT PATHOLOGICAL FRACTURE, UNSPECIFIED OSTEOPOROSIS TYPE: ICD-10-CM

## 2020-01-28 DIAGNOSIS — J42 CHRONIC BRONCHITIS, UNSPECIFIED CHRONIC BRONCHITIS TYPE: ICD-10-CM

## 2020-01-28 DIAGNOSIS — E78.49 OTHER HYPERLIPIDEMIA: ICD-10-CM

## 2020-01-28 DIAGNOSIS — R42 VERTIGO: ICD-10-CM

## 2020-01-28 PROCEDURE — 99214 PR OFFICE/OUTPT VISIT, EST, LEVL IV, 30-39 MIN: ICD-10-PCS | Mod: S$PBB,,, | Performed by: FAMILY MEDICINE

## 2020-01-28 PROCEDURE — 99214 OFFICE O/P EST MOD 30 MIN: CPT | Performed by: FAMILY MEDICINE

## 2020-01-28 PROCEDURE — 99214 OFFICE O/P EST MOD 30 MIN: CPT | Mod: S$PBB,,, | Performed by: FAMILY MEDICINE

## 2020-01-28 RX ORDER — LISINOPRIL 20 MG/1
20 TABLET ORAL DAILY
Qty: 90 TABLET | Refills: 1 | Status: SHIPPED | OUTPATIENT
Start: 2020-01-28 | End: 2020-07-24 | Stop reason: SDUPTHER

## 2020-01-28 RX ORDER — ALBUTEROL SULFATE 90 UG/1
2 AEROSOL, METERED RESPIRATORY (INHALATION) EVERY 6 HOURS PRN
Qty: 18 G | Refills: 3 | Status: SHIPPED | OUTPATIENT
Start: 2020-01-28 | End: 2020-05-11 | Stop reason: SDUPTHER

## 2020-01-28 RX ORDER — ALENDRONATE SODIUM 70 MG/1
70 TABLET ORAL
Qty: 4 TABLET | Refills: 5 | Status: SHIPPED | OUTPATIENT
Start: 2020-01-28 | End: 2020-09-23 | Stop reason: SDUPTHER

## 2020-01-28 RX ORDER — MECLIZINE HYDROCHLORIDE 25 MG/1
25 TABLET ORAL 3 TIMES DAILY PRN
Qty: 30 TABLET | Refills: 1 | Status: SHIPPED | OUTPATIENT
Start: 2020-01-28 | End: 2020-04-15

## 2020-01-28 RX ORDER — TRIAMTERENE/HYDROCHLOROTHIAZID 37.5-25 MG
1 TABLET ORAL DAILY
Qty: 90 TABLET | Refills: 1 | Status: SHIPPED | OUTPATIENT
Start: 2020-01-28 | End: 2020-07-24 | Stop reason: SDUPTHER

## 2020-01-28 RX ORDER — EZETIMIBE 10 MG/1
10 TABLET ORAL DAILY
Qty: 90 TABLET | Refills: 1 | Status: SHIPPED | OUTPATIENT
Start: 2020-01-28 | End: 2020-07-24 | Stop reason: SDUPTHER

## 2020-01-28 NOTE — PROGRESS NOTES
SUBJECTIVE:    Patient ID: Luz Marina Moses is a 64 y.o. female.    Chief Complaint: Anxiety (rx refills)  63 yo female here today to follow up on her chronic medical conditions, pt lives in [place] and gets most of her specialty care in .    I have ordered mammo and low density lung scan pt is having problem getting these tests completed in [place].      SPMHX:  CPOD: Salmeterol 50mcg, Albuterol 90mcg, pt has some congestion  HTN: Lisinopril 20mg, Triamterene HCTZ 37.5/25mg, blood pressure is well controlled  Hyperlipidemia: (Statin Myopathy)  On Zetia 10mg  Chronic Hep C: Glecaprevir/pibrentasvir 100/40mg  Osteoporosis: Fosamax 70mg (Pt not really taking) Last Dexa was 2017  Allergic Rhinitis: Flonase,  PTSD:  Depression: Wellbutrin 75mg  Fibromyalgia: Gabapentin 600mg   Vertigo: Meclizine 25mg,     Specialists:  Gastro: Dr Johansen  Pain Management: Dr Rucker  ENT: Dr Geronimo      Smoke: 1 ppd  ETOH: none  Exercise: None      Hypertension   This is a chronic problem. The current episode started more than 1 year ago. The problem is unchanged. The problem is controlled. Associated symptoms include anxiety. Pertinent negatives include no blurred vision, chest pain, headaches, malaise/fatigue, neck pain, orthopnea, palpitations, peripheral edema, PND, shortness of breath or sweats. There are no associated agents to hypertension. Risk factors for coronary artery disease include dyslipidemia, sedentary lifestyle, smoking/tobacco exposure and post-menopausal state. Past treatments include ACE inhibitors and diuretics. The current treatment provides moderate improvement. Compliance problems include exercise and diet.    Hyperlipidemia   This is a chronic problem. The current episode started more than 1 year ago. The problem is controlled. Recent lipid tests were reviewed and are normal. Factors aggravating her hyperlipidemia include thiazides. Pertinent negatives include no chest pain, myalgias or shortness of  breath. Current antihyperlipidemic treatment includes ezetimibe (Statin intolerant). The current treatment provides mild improvement of lipids. Compliance problems include adherence to diet and adherence to exercise.    COPD  The patient is not currently have symptoms / an exacerbation. The patient has COPD for approximately 5 years. Symptoms in previous episodes have included cough, wheezing and increased sputum, and typically last 3 days. Previous episodes have been exacerbated by viral infections. Current treatment includes albuterol inhaler and salmeterol, which generally provides complete resolution of symptoms.   She uses 1 pillow at night. Patient currently is not on home oxygen therapy.. The patient is having no constitutional symptoms, denying fever, chills, anorexia, or weight loss. The patient has not been hospitalized for this condition before. She currently smokes 1 packs per day. The patient is experiencing exercise intolerance.    Osteoporosis  Pt had a Dexa Scan prior to age 65 due to repetitive breaks (not pathologic fractures) I cannot see the results of the last dexa scan, pt has not been faithful in taking her bisphosphonate. Her last Dexa was 3 years ago.  + smoking, not thin and frail, , positive hx of smoking and lack of exercise.    Past Medical History:   Diagnosis Date    Allergy     Anemia     Anxiety     Arthritis     COPD (chronic obstructive pulmonary disease)     Depression     Encounter for blood transfusion     Encounter for screening mammogram for breast cancer 5/8/2018    Fibromyalgia     currently on disability for fibromyalgia.     H/O fibromyalgia     Heart murmur     Hepatitis C 01/2017    Hyperlipidemia     Hypertension     IBS (irritable bowel syndrome)     Need for hepatitis C screening test 5/8/2018    Osteoarthritis     Osteoporosis     PTSD (post-traumatic stress disorder)      Social History     Socioeconomic History    Marital status:       Spouse name: Not on file    Number of children: 2    Years of education: Not on file    Highest education level: Not on file   Occupational History    Occupation: Disabled-      Comment: PTSD, Archana   Social Needs    Financial resource strain: Not on file    Food insecurity:     Worry: Not on file     Inability: Not on file    Transportation needs:     Medical: Not on file     Non-medical: Not on file   Tobacco Use    Smoking status: Current Every Day Smoker     Packs/day: 1.50     Years: 40.00     Pack years: 60.00     Start date: 1971    Smokeless tobacco: Never Used   Substance and Sexual Activity    Alcohol use: No     Comment: denies use    Drug use: No    Sexual activity: Not Currently   Lifestyle    Physical activity:     Days per week: Not on file     Minutes per session: Not on file    Stress: Not on file   Relationships    Social connections:     Talks on phone: Not on file     Gets together: Not on file     Attends Zoroastrian service: Not on file     Active member of club or organization: Not on file     Attends meetings of clubs or organizations: Not on file     Relationship status: Not on file   Other Topics Concern    Not on file   Social History Narrative    Currently on disability for fibromyalgia.         1 step son and 1 son.     Past Surgical History:   Procedure Laterality Date     SECTION      FRACTURE SURGERY      HYSTERECTOMY      partial     LEG SURGERY Left 16    orif    TONSILLECTOMY       Family History   Problem Relation Age of Onset    Arthritis Mother     Kidney disease Mother     Depression Mother     Diabetes Mother     Early death Mother     Hypertension Mother     Mental illness Mother     Miscarriages / Stillbirths Mother     Stroke Sister     Stroke Brother     Hyperlipidemia Brother     Stroke Sister     Alcohol abuse Brother     Liver disease Brother     Thrombophlebitis Other     Cancer Sister 45        breast      Arthritis Sister     Heart disease Maternal Grandfather      Current Outpatient Medications   Medication Sig Dispense Refill    albuterol (PROVENTIL HFA) 90 mcg/actuation inhaler Inhale 2 puffs into the lungs every 6 (six) hours as needed for Wheezing. Rescue 18 g 3    alendronate (FOSAMAX) 70 MG tablet Take 1 tablet (70 mg total) by mouth every 7 days. 4 tablet 5    buPROPion (WELLBUTRIN) 75 MG tablet Take 75 mg by mouth once daily.      esomeprazole (NEXIUM PACKET) 40 mg GrPS Take 40 mg by mouth before breakfast. 1200 mg 5    ezetimibe (ZETIA) 10 mg tablet Take 1 tablet (10 mg total) by mouth once daily. 90 tablet 1    fluticasone propionate (FLONASE) 50 mcg/actuation nasal spray 2 sprays (100 mcg total) by Each Nare route once daily. 16 g 5    gabapentin (NEURONTIN) 600 MG tablet Take 600 mg by mouth 3 (three) times daily.      glecaprevir-pibrentasvir (MAVYRET) 100-40 mg Tab Take 3 tablets by mouth once daily. Take with food. 84 tablet 1    lisinopril (PRINIVIL,ZESTRIL) 20 MG tablet Take 1 tablet (20 mg total) by mouth once daily. 90 tablet 1    meclizine (ANTIVERT) 25 mg tablet Take 1 tablet (25 mg total) by mouth 3 (three) times daily as needed. 30 tablet 1    morphine (MSIR) 15 MG tablet 15 mg every 6 (six) hours as needed.       ondansetron (ZOFRAN) 8 MG tablet Take 1 tablet (8 mg total) by mouth every 6 to 8 hours as needed (Nausea). 30 tablet 0    salmeterol (SEREVENT) 50 mcg/dose diskus inhaler Inhale 1 puff into the lungs 2 (two) times daily. Controller 1 each 5    tizanidine (ZANAFLEX) 4 MG tablet Take 4 mg by mouth every 6 (six) hours as needed.      triamterene-hydrochlorothiazide 37.5-25 mg (MAXZIDE-25) 37.5-25 mg per tablet Take 1 tablet by mouth once daily. 90 tablet 1    gabapentin (NEURONTIN) 600 MG tablet Take 600 mg by mouth 3 (three) times daily.      NARCAN 4 mg/actuation Spry        No current facility-administered medications for this visit.      Review of patient's  "allergies indicates:   Allergen Reactions    Atorvastatin     Red dye Diarrhea and Rash    Spiriva respimat [tiotropium bromide] Anaphylaxis    Augmentin [amoxicillin-pot clavulanate]     Crestor [rosuvastatin] Other (See Comments)     Chest Pain    Lodine [etodolac] Other (See Comments)     Dizziness     Xarelto [rivaroxaban]      Blister on tongue and soreness all over mouth        Review of Systems   Constitutional: Negative for activity change, appetite change, fatigue, malaise/fatigue and unexpected weight change.   HENT: Negative for congestion, ear pain, hearing loss, postnasal drip, sinus pressure, sinus pain, sneezing and sore throat.    Eyes: Negative for blurred vision, photophobia and pain.   Respiratory: Negative for cough, chest tightness, shortness of breath and wheezing.    Cardiovascular: Negative for chest pain, palpitations, orthopnea, leg swelling and PND.   Gastrointestinal: Negative for abdominal distention, abdominal pain, blood in stool, constipation, diarrhea, nausea and vomiting.   Endocrine: Negative for cold intolerance, heat intolerance, polydipsia and polyuria.   Genitourinary: Negative for difficulty urinating, dysuria, flank pain, frequency, hematuria, pelvic pain and urgency.   Musculoskeletal: Negative for arthralgias, back pain, joint swelling, myalgias and neck pain.   Skin: Negative for pallor.   Allergic/Immunologic: Negative for environmental allergies and food allergies.   Neurological: Negative for dizziness, weakness, light-headedness, numbness and headaches.   Hematological: Does not bruise/bleed easily.   Psychiatric/Behavioral: Negative for agitation, confusion, decreased concentration and sleep disturbance. The patient is not nervous/anxious.           Blood pressure 134/74, pulse 83, height 5' 7" (1.702 m), weight 82.1 kg (181 lb), SpO2 99 %. Body mass index is 28.35 kg/m².   Objective:      Physical Exam   Constitutional: She is oriented to person, place, and " time. She appears well-developed and well-nourished. No distress.   HENT:   Head: Normocephalic and atraumatic.   Right Ear: External ear normal.   Left Ear: External ear normal.   Eyes: Pupils are equal, round, and reactive to light. Conjunctivae are normal. Right eye exhibits no discharge. Left eye exhibits no discharge.   Cardiovascular: Normal rate, regular rhythm and normal heart sounds.   No murmur heard.  Pulmonary/Chest: Effort normal and breath sounds normal. No respiratory distress. She has no wheezes.   Neurological: She is alert and oriented to person, place, and time.   Skin: Skin is warm and dry. No rash noted. She is not diaphoretic.   Vitals reviewed.          Assessment:       1. Essential hypertension    2. Chronic bronchitis, unspecified chronic bronchitis type    3. Other hyperlipidemia    4. Simple chronic bronchitis    5. Vertigo    6. Osteoporosis without current pathological fracture, unspecified osteoporosis type         Plan:           Essential hypertension  -     lisinopril (PRINIVIL,ZESTRIL) 20 MG tablet; Take 1 tablet (20 mg total) by mouth once daily.  Dispense: 90 tablet; Refill: 1  -     triamterene-hydrochlorothiazide 37.5-25 mg (MAXZIDE-25) 37.5-25 mg per tablet; Take 1 tablet by mouth once daily.  Dispense: 90 tablet; Refill: 1  -     Basic metabolic panel; Future; Expected date: 01/28/2020  Blood pressure is well controlled today, she is not exercising, we discussed the need to increase her activity and watch her sodium intake.  Chronic bronchitis, unspecified chronic bronchitis type  -     salmeterol (SEREVENT) 50 mcg/dose diskus inhaler; Inhale 1 puff into the lungs 2 (two) times daily. Controller  Dispense: 1 each; Refill: 5  -     albuterol (PROVENTIL HFA) 90 mcg/actuation inhaler; Inhale 2 puffs into the lungs every 6 (six) hours as needed for Wheezing. Rescue  Dispense: 18 g; Refill: 3  Pt is stable on her medications will refill,   Other hyperlipidemia  -     ezetimibe  (ZETIA) 10 mg tablet; Take 1 tablet (10 mg total) by mouth once daily.  Dispense: 90 tablet; Refill: 1  -     Lipid panel; Future; Expected date: 01/28/2020  Will get lipid panel, cannot take statins.    Vertigo  -     meclizine (ANTIVERT) 25 mg tablet; Take 1 tablet (25 mg total) by mouth 3 (three) times daily as needed.  Dispense: 30 tablet; Refill: 1  Not currently an issue pt has symptoms when she turns her head to the right she has been given a handout on performing an epley manuver at home.    Osteoporosis without current pathological fracture, unspecified osteoporosis type  -     DXA Bone Density Spine And Hip; Future; Expected date: 01/28/2020  -     alendronate (FOSAMAX) 70 MG tablet; Take 1 tablet (70 mg total) by mouth every 7 days.  Dispense: 4 tablet; Refill: 5  Will repeat her bone density scan to determine if she need continual treatment.

## 2020-02-06 ENCOUNTER — TELEPHONE (OUTPATIENT)
Dept: PHARMACY | Facility: CLINIC | Age: 65
End: 2020-02-06

## 2020-02-06 NOTE — TELEPHONE ENCOUNTER
Initial touch base conducted for Mavyret - Name/ confirmed. Confirmed patient started medication as instructed on .  Patient confirms that they are taking Mavyret every day between 11 am-1 pm WITH food. Patient advised to try and take the medicine at the same time every day as consistently as possible to have drug levels remain stable in the body. Patient verbalized understanding. Patient denies skipping or missing doses. Patient reports experiencing nausea and fatigue, but both are tolerable. Patient was experiencing nausea before beginning treatment and takes Zofran to help treat it. Patient also states she was experiencing fatigue before treatment, but it became a tad worse when beginning Mavyret. Patient advised to ensure she remains hydrated and consumes an adequate amount of water during the day. Patient verbalized understanding. Patient reports no new medications, otc remedies, or allergies. Patient reminded of lab appointments.  Patient counseled on importance of maintaining adherence and keeping lab appointments which were scheduled.  Advised to call [facility] and provider if any issues arise.  No questions or concerns. Aware [facility] will call for refills when patient has 7 days of medication on hand.

## 2020-02-10 ENCOUNTER — PATIENT MESSAGE (OUTPATIENT)
Dept: GASTROENTEROLOGY | Facility: CLINIC | Age: 65
End: 2020-02-10

## 2020-02-10 ENCOUNTER — TELEPHONE (OUTPATIENT)
Dept: GASTROENTEROLOGY | Facility: CLINIC | Age: 65
End: 2020-02-10

## 2020-02-10 NOTE — TELEPHONE ENCOUNTER
----- Message from Stephanie Otero sent at 2/10/2020  3:16 PM CST -----  Contact: patient  Type:  Patient Returning Call    Who Called:patient  Who Left Message for Patient:Debbie Anderson  Does the patient know what this is regarding?:michael appt  Would the patient rather a call back or a response via [facility]? Call  Best Call Back Number:[phone]  Additional Information: please call back

## 2020-02-20 ENCOUNTER — TELEPHONE (OUTPATIENT)
Dept: PHARMACY | Facility: CLINIC | Age: 65
End: 2020-02-20

## 2020-02-20 RX ORDER — PROMETHAZINE HYDROCHLORIDE 12.5 MG/1
12.5 TABLET ORAL EVERY 4 HOURS PRN
Qty: 20 TABLET | Refills: 0 | Status: SHIPPED | OUTPATIENT
Start: 2020-02-20 | End: 2020-04-15 | Stop reason: SDUPTHER

## 2020-02-20 NOTE — TELEPHONE ENCOUNTER
Mavyret refill (2 of 2) and reassessment attempted (attempt 1). NA, LVM for call back. Will f/u if no call back. $0 copay.

## 2020-02-20 NOTE — TELEPHONE ENCOUNTER
Mavyret refill (2 of 2) confirmed and reassessment complete. We will ship Mavyret refill on  via fedex to arrive on . $0.00 copay- 004. Confirmed 2 patient identifiers - name and . Therapy appropriate.     Patient has 7 doses of Mavyret remaining and takes it around 11:30am-12:30pm daily.  Pt reports she is having significant difficulty with nausea. Pt reports she has a prescription for Zofran, but is finding that the medication is not working well to keep her nausea under control. Pt states that some days the nausea is so bad that it makes it difficult to eat. Advised pt on the importance of making sure that she is able to take her Mavyret dose WITH FOOD. Pt verbalized understanding. Advised pt on options for treating nausea. Pt agreed that it would be worth seeing if the provider would be willing to prescribe Phenergan to help with the nausea. Advised the patient that the Phenergan may lead to additional fatigue. The patient was comfortable with that side effect if it would help with the nausea. Advised the patient that [facility] will contact the provider to make the request for Phenergan. Will f/u with patient regarding side effect.     Patient denies missing any doses, no new medications, no new allergies or health conditions reported at this time. Allergies reviewed and medication reconciliation complete ([facility]).  Disease education reviewed (including transmission and prevention). Patient counseled on importance of maintaining adherence and keeping lab appointments which were scheduled. All questions answered and addressed to patients satisfaction. Advised to call [facility] and provider if any issues arise.  Pt verbalized understanding.

## 2020-03-23 DIAGNOSIS — B18.2 CHRONIC HEPATITIS C WITHOUT HEPATIC COMA: Primary | ICD-10-CM

## 2020-03-26 ENCOUNTER — LAB VISIT (OUTPATIENT)
Dept: LAB | Facility: HOSPITAL | Age: 65
End: 2020-03-26
Attending: INTERNAL MEDICINE
Payer: MEDICARE

## 2020-03-26 DIAGNOSIS — B18.2 CHRONIC HEPATITIS C WITHOUT HEPATIC COMA: ICD-10-CM

## 2020-03-26 LAB
ALBUMIN SERPL BCP-MCNC: 3.9 G/DL (ref 3.5–5.2)
ALP SERPL-CCNC: 123 U/L (ref 55–135)
ALT SERPL W/O P-5'-P-CCNC: 14 U/L (ref 10–44)
AST SERPL-CCNC: 17 U/L (ref 10–40)
BASOPHILS # BLD AUTO: 0.05 K/UL (ref 0–0.2)
BASOPHILS NFR BLD: 0.6 % (ref 0–1.9)
BILIRUB DIRECT SERPL-MCNC: 0.2 MG/DL (ref 0.1–0.3)
BILIRUB SERPL-MCNC: 0.3 MG/DL (ref 0.1–1)
DIFFERENTIAL METHOD: ABNORMAL
EOSINOPHIL # BLD AUTO: 0 K/UL (ref 0–0.5)
EOSINOPHIL NFR BLD: 0.3 % (ref 0–8)
ERYTHROCYTE [DISTWIDTH] IN BLOOD BY AUTOMATED COUNT: 13.3 % (ref 11.5–14.5)
HCT VFR BLD AUTO: 38.3 % (ref 37–48.5)
HGB BLD-MCNC: 12.1 G/DL (ref 12–16)
IMM GRANULOCYTES # BLD AUTO: 0.02 K/UL (ref 0–0.04)
IMM GRANULOCYTES NFR BLD AUTO: 0.2 % (ref 0–0.5)
INR PPP: 0.9 (ref 0.8–1.2)
LYMPHOCYTES # BLD AUTO: 3 K/UL (ref 1–4.8)
LYMPHOCYTES NFR BLD: 34.4 % (ref 18–48)
MCH RBC QN AUTO: 30.9 PG (ref 27–31)
MCHC RBC AUTO-ENTMCNC: 31.6 G/DL (ref 32–36)
MCV RBC AUTO: 98 FL (ref 82–98)
MONOCYTES # BLD AUTO: 0.7 K/UL (ref 0.3–1)
MONOCYTES NFR BLD: 7.5 % (ref 4–15)
NEUTROPHILS # BLD AUTO: 5 K/UL (ref 1.8–7.7)
NEUTROPHILS NFR BLD: 57 % (ref 38–73)
NRBC BLD-RTO: 0 /100 WBC
PLATELET # BLD AUTO: 493 K/UL (ref 150–350)
PMV BLD AUTO: 9.6 FL (ref 9.2–12.9)
PROT SERPL-MCNC: 7.9 G/DL (ref 6–8.4)
PROTHROMBIN TIME: 9.4 SEC (ref 9–12.5)
RBC # BLD AUTO: 3.91 M/UL (ref 4–5.4)
WBC # BLD AUTO: 8.69 K/UL (ref 3.9–12.7)

## 2020-03-26 PROCEDURE — 36415 COLL VENOUS BLD VENIPUNCTURE: CPT

## 2020-03-26 PROCEDURE — 85610 PROTHROMBIN TIME: CPT

## 2020-03-26 PROCEDURE — 87522 HEPATITIS C REVRS TRNSCRPJ: CPT

## 2020-03-26 PROCEDURE — 85025 COMPLETE CBC W/AUTO DIFF WBC: CPT

## 2020-03-26 PROCEDURE — 80076 HEPATIC FUNCTION PANEL: CPT

## 2020-03-30 LAB
HCV RNA SERPL NAA+PROBE-LOG IU: <1.08 LOG (10) IU/ML
HCV RNA SERPL QL NAA+PROBE: NOT DETECTED IU/ML
HCV RNA SPEC NAA+PROBE-ACNC: <12 IU/ML

## 2020-03-31 ENCOUNTER — PATIENT MESSAGE (OUTPATIENT)
Dept: GASTROENTEROLOGY | Facility: CLINIC | Age: 65
End: 2020-03-31

## 2020-03-31 ENCOUNTER — TELEPHONE (OUTPATIENT)
Dept: GASTROENTEROLOGY | Facility: CLINIC | Age: 65
End: 2020-03-31

## 2020-03-31 DIAGNOSIS — B18.2 CHRONIC HEPATITIS C WITHOUT HEPATIC COMA: Primary | ICD-10-CM

## 2020-03-31 DIAGNOSIS — R93.3 ABNORMAL FINDING ON GI TRACT IMAGING: ICD-10-CM

## 2020-03-31 NOTE — TELEPHONE ENCOUNTER
"[facility]  Gastroenterology    PCP: Navjot Baig MD    3/31/20     The patient location is: Home  The chief complaint leading to consultation is:Follow-up Chronic HCV  Visit type: Virtual visit with synchronous audio and video- converted to telephone visit, as patient could not log into telemedicine  Total time spent with patient: 5 minutes  Each patient to whom he or she provides medical services by telemedicine is:  (1) informed of the relationship between the physician and patient and the respective role of any other health care provider with respect to management of the patient; and (2) notified that he or she may decline to receive medical services by telemedicine and may withdraw from such care at any time.    Notes: See below    Reason for Visit: Follow-up HCV    Brief HPI: Luz Marina Moses is a 64 y.o. female with history of HTN, HLD, COPD, anxiety/depression who initially presented for evaluation of chronic HCV. Patient had a screening HCV Ab drawn which was positive in 2017. She had subsequent labs drawn which showed a genotype 1a with a viral load of > 3.6 million RNA copies. Her US abdomen was done at [facility] and was negative for cirrhosis but did show CBD dilation of 1.9 cm without any intrahepatic biliary dilation. It was recommended she go for "another test" but patient failed to follow-up.      Repeat labs completed during subsequent visit. Viral load of 222. Fibrosure F0 and liver ultrasound negative for cirrhosis or liver lesions. Patient's ultrasound did show common hepatic duct dilation of 1.7 cm for which MRCP was ordered for further evaluation but has not yet been completed. Patient was scheduled for EGD for complaints of GERD and abdominal bloating and a screening colonoscopy but this was not completed either as patient had a death in the family and wanted to wait to schedule.       Subjective:   Interval HPI: Patient was started on Mavyret x 8 weeks with first dose on " . She completed the 8 weeks of therapy and had viral load drawn at end of therapy on 20 which showed that viral load was undetectable. MRCP for common hepatic duct dilation still not completed.     Past Medical History:   Diagnosis Date    Allergy     Anemia     Anxiety     Arthritis     COPD (chronic obstructive pulmonary disease)     Depression     Encounter for blood transfusion     Encounter for screening mammogram for breast cancer 2018    Fibromyalgia     currently on disability for fibromyalgia.     H/O fibromyalgia     Heart murmur     Hepatitis C 2017    Hyperlipidemia     Hypertension     IBS (irritable bowel syndrome)     Need for hepatitis C screening test 2018    Osteoarthritis     Osteoporosis     PTSD (post-traumatic stress disorder)        Past Surgical History:   Procedure Laterality Date     SECTION      FRACTURE SURGERY      HYSTERECTOMY      partial     LEG SURGERY Left 16    orif    TONSILLECTOMY         Current Outpatient Medications on File Prior to Visit   Medication Sig Dispense Refill    albuterol (PROVENTIL HFA) 90 mcg/actuation inhaler Inhale 2 puffs into the lungs every 6 (six) hours as needed for Wheezing. Rescue 18 g 3    alendronate (FOSAMAX) 70 MG tablet Take 1 tablet (70 mg total) by mouth every 7 days. 4 tablet 5    buPROPion (WELLBUTRIN) 75 MG tablet Take 75 mg by mouth once daily.      esomeprazole (NEXIUM PACKET) 40 mg GrPS Take 40 mg by mouth before breakfast. 1200 mg 5    ezetimibe (ZETIA) 10 mg tablet Take 1 tablet (10 mg total) by mouth once daily. 90 tablet 1    fluticasone propionate (FLONASE) 50 mcg/actuation nasal spray 2 sprays (100 mcg total) by Each Nare route once daily. 16 g 5    gabapentin (NEURONTIN) 600 MG tablet Take 600 mg by mouth 3 (three) times daily.      gabapentin (NEURONTIN) 600 MG tablet Take 600 mg by mouth 3 (three) times daily.      lisinopril (PRINIVIL,ZESTRIL) 20 MG tablet  Take 1 tablet (20 mg total) by mouth once daily. 90 tablet 1    meclizine (ANTIVERT) 25 mg tablet Take 1 tablet (25 mg total) by mouth 3 (three) times daily as needed. 30 tablet 1    morphine (MSIR) 15 MG tablet 15 mg every 6 (six) hours as needed.       NARCAN 4 mg/actuation Spry       ondansetron (ZOFRAN) 8 MG tablet Take 1 tablet (8 mg total) by mouth every 6 to 8 hours as needed (Nausea). 30 tablet 0    promethazine (PHENERGAN) 12.5 MG Tab Take 1 tablet (12.5 mg total) by mouth every 4 (four) hours as needed. 20 tablet 0    salmeterol (SEREVENT) 50 mcg/dose diskus inhaler Inhale 1 puff into the lungs 2 (two) times daily. Controller 1 each 5    tizanidine (ZANAFLEX) 4 MG tablet Take 4 mg by mouth every 6 (six) hours as needed.      triamterene-hydrochlorothiazide 37.5-25 mg (MAXZIDE-25) 37.5-25 mg per tablet Take 1 tablet by mouth once daily. 90 tablet 1     No current facility-administered medications on file prior to visit.        Review of patient's allergies indicates:   Allergen Reactions    Atorvastatin     Red dye Diarrhea and Rash    Spiriva respimat [tiotropium bromide] Anaphylaxis    Augmentin [amoxicillin-pot clavulanate]     Crestor [rosuvastatin] Other (See Comments)     Chest Pain    Lodine [etodolac] Other (See Comments)     Dizziness     Xarelto [rivaroxaban]      Blister on tongue and soreness all over mouth        Social History     Socioeconomic History    Marital status:      Spouse name: Not on file    Number of children: 2    Years of education: Not on file    Highest education level: Not on file   Occupational History    Occupation: Disabled-      Comment: PTSD, Archana   Social Needs    Financial resource strain: Not on file    Food insecurity:     Worry: Not on file     Inability: Not on file    Transportation needs:     Medical: Not on file     Non-medical: Not on file   Tobacco Use    Smoking status: Current Every Day Smoker     Packs/day: 1.50     Years:  40.00     Pack years: 60.00     Start date: 1/1/1971    Smokeless tobacco: Never Used   Substance and Sexual Activity    Alcohol use: No     Comment: denies use    Drug use: No    Sexual activity: Not Currently   Lifestyle    Physical activity:     Days per week: Not on file     Minutes per session: Not on file    Stress: Not on file   Relationships    Social connections:     Talks on phone: Not on file     Gets together: Not on file     Attends Anabaptist service: Not on file     Active member of club or organization: Not on file     Attends meetings of clubs or organizations: Not on file     Relationship status: Not on file   Other Topics Concern    Not on file   Social History Narrative    Currently on disability for fibromyalgia.         1 step son and 1 son.       Family History   Problem Relation Age of Onset    Arthritis Mother     Kidney disease Mother     Depression Mother     Diabetes Mother     Early death Mother     Hypertension Mother     Mental illness Mother     Miscarriages / Stillbirths Mother     Stroke Sister     Stroke Brother     Hyperlipidemia Brother     Stroke Sister     Alcohol abuse Brother     Liver disease Brother     Thrombophlebitis Other     Cancer Sister 45        breast     Arthritis Sister     Heart disease Maternal Grandfather        Review of Systems   Constitutional: Negative for appetite change, fever and unexpected weight change.   HENT: Negative for postnasal drip, rhinorrhea, sneezing, sore throat and trouble swallowing.    Eyes: Negative for visual disturbance.   Respiratory: Negative for cough, shortness of breath and wheezing.    Cardiovascular: Negative for chest pain, palpitations and leg swelling.   Gastrointestinal: Negative for abdominal pain, blood in stool, constipation, diarrhea, nausea and vomiting.   Genitourinary: Negative for dysuria.   Musculoskeletal: Negative for arthralgias, joint swelling and myalgias.   Skin: Negative for color  change, pallor and rash.   Neurological: Negative for weakness, light-headedness, numbness and headaches.   Hematological: Negative for adenopathy. Does not bruise/bleed easily.   Psychiatric/Behavioral: Negative for agitation.           Objective:   Vitals: There were no vitals filed for this visit.    Physical Exam- Unable to complete due to virtual visit converted to telephone visit    IMPRESSION     Problem List Items Addressed This Visit        GI    Chronic hepatitis C without hepatic coma - Primary      Other Visit Diagnoses     Abnormal finding on GI tract imaging              PLANS:    - Patient is s/p 8 weeks therapy with Mavyret (started 01/30/20) for chronic HCV- genotype 1a, initial viral load 222  - Most recent viral load on 03/26/20 post treatment is undetectable  - Will recheck viral load in at 3 month, 6 month and 1 year huma post-treatment to ensure viral load remains undetectable  - Liver US was negative for any liver cirrhosis or liver lesions. No further surveillance required  - MRCP ordered previously for finding of common hepatic duct dilation noted on US. Imaging not yet completed  - EGD/colonoscopy for abdominal bloating and CRC screening to be scheduled at a later date, as no elective procedures currently being performed due to COVID-19 situation  - RTC in 3 months for lab check and scheduling of procedures and imaging    Chronic hepatitis C without hepatic coma    Abnormal finding on GI tract imaging      Caitlin Johansen MD  Gastroenterology and Hepatology

## 2020-03-31 NOTE — TELEPHONE ENCOUNTER
Patient was calling because she is scheduled to have a video call with you today at 9:40am but video is not coming up . Patient  Was told she that her phone has to be on wifi to be able to access video and she does not have wifi and was trying to use her cellular data but that does not work.  she is requesting that you give her a call. Please advise.

## 2020-04-15 RX ORDER — PROMETHAZINE HYDROCHLORIDE 12.5 MG/1
12.5 TABLET ORAL EVERY 4 HOURS PRN
Qty: 20 TABLET | Refills: 0 | Status: SHIPPED | OUTPATIENT
Start: 2020-04-15 | End: 2021-06-22

## 2020-05-11 DIAGNOSIS — J30.1 NON-SEASONAL ALLERGIC RHINITIS DUE TO POLLEN: ICD-10-CM

## 2020-05-11 DIAGNOSIS — J42 CHRONIC BRONCHITIS, UNSPECIFIED CHRONIC BRONCHITIS TYPE: ICD-10-CM

## 2020-05-11 DIAGNOSIS — R42 VERTIGO: Primary | ICD-10-CM

## 2020-05-11 RX ORDER — MECLIZINE HYDROCHLORIDE 25 MG/1
25 TABLET ORAL 3 TIMES DAILY PRN
Qty: 90 TABLET | Refills: 1 | Status: SHIPPED | OUTPATIENT
Start: 2020-05-11 | End: 2021-04-21 | Stop reason: SDUPTHER

## 2020-05-11 RX ORDER — ALBUTEROL SULFATE 90 UG/1
2 AEROSOL, METERED RESPIRATORY (INHALATION) EVERY 6 HOURS PRN
Qty: 18 G | Refills: 3 | Status: SHIPPED | OUTPATIENT
Start: 2020-05-11 | End: 2021-05-26 | Stop reason: SDUPTHER

## 2020-05-11 RX ORDER — FLUTICASONE PROPIONATE 50 MCG
2 SPRAY, SUSPENSION (ML) NASAL DAILY
Qty: 16 G | Refills: 5 | Status: SHIPPED | OUTPATIENT
Start: 2020-05-11 | End: 2020-09-23 | Stop reason: SDUPTHER

## 2020-05-30 NOTE — PROGRESS NOTES
HPI     Decreased near and distance visual acuity.  Patient wears OTC readers.  New patient last eye exam 40 years ago.  Update glasses RX.    Last edited by Cedrick Ashton, OD on 9/30/2019  1:56 PM. (History)            Assessment /Plan     For exam results, see Encounter Report.    Cataract, nuclear sclerotic senile, bilateral    Bilateral presbyopia      Minimal cataracts OU, not surgical    Dispense Final Rx for glasses or may use OTC glasses.  RTC 1 year  Discussed above and answered questions.                    98.7

## 2020-06-09 ENCOUNTER — TELEPHONE (OUTPATIENT)
Dept: GASTROENTEROLOGY | Facility: CLINIC | Age: 65
End: 2020-06-09

## 2020-06-09 NOTE — TELEPHONE ENCOUNTER
Spoke to pt and she needs to  a copy of her recent labs and clinic note from Dr Johansen.  Printed labs and clinic note, advised pt to stop by the clinic and I will have it at the . Pt agreed to plan.

## 2020-06-25 DIAGNOSIS — B18.2 CHRONIC HEPATITIS C WITHOUT HEPATIC COMA: Primary | ICD-10-CM

## 2020-06-29 ENCOUNTER — TELEPHONE (OUTPATIENT)
Dept: GASTROENTEROLOGY | Facility: CLINIC | Age: 65
End: 2020-06-29

## 2020-06-29 NOTE — TELEPHONE ENCOUNTER
Called patient to schedule follow up treatment labs for Hep C no answer left voicemail to call back.

## 2020-06-29 NOTE — TELEPHONE ENCOUNTER
----- Message from Agnes Gracia PharmD sent at 6/25/2020  8:17 AM CDT -----  Regarding: SVR 12 Labs  CHRISTIAN Kline Dr. was following up on this patient's SVR labs to confirm HCV eradication and noticed none have been reported at this time. Has the patient completed these labs? If not, can the labs be scheduled to confirm successful HCV treatment?    Let us know how we can be of assistance.     Thank you,   Agnes Gracia, PharmD  Clinical Pharmacist   [facility]  [phone]

## 2020-07-24 DIAGNOSIS — E78.49 OTHER HYPERLIPIDEMIA: ICD-10-CM

## 2020-07-24 DIAGNOSIS — I10 ESSENTIAL HYPERTENSION: ICD-10-CM

## 2020-07-27 RX ORDER — TRIAMTERENE/HYDROCHLOROTHIAZID 37.5-25 MG
1 TABLET ORAL DAILY
Qty: 30 TABLET | Refills: 0 | Status: SHIPPED | OUTPATIENT
Start: 2020-07-27 | End: 2020-09-23 | Stop reason: SDUPTHER

## 2020-07-27 RX ORDER — EZETIMIBE 10 MG/1
10 TABLET ORAL DAILY
Qty: 30 TABLET | Refills: 0 | Status: SHIPPED | OUTPATIENT
Start: 2020-07-27 | End: 2020-08-25 | Stop reason: SDUPTHER

## 2020-07-27 RX ORDER — LISINOPRIL 20 MG/1
20 TABLET ORAL DAILY
Qty: 30 TABLET | Refills: 0 | Status: SHIPPED | OUTPATIENT
Start: 2020-07-27 | End: 2020-09-14 | Stop reason: SDUPTHER

## 2020-07-27 NOTE — TELEPHONE ENCOUNTER
Please call her and have her get her labs completed , if she cannot get her labs completed before tomorrow the appointment should be rescheduled.

## 2020-07-31 NOTE — TELEPHONE ENCOUNTER
Patient rescheduled her appointment yesterday and told her to please get labs prior to her appointment

## 2020-08-05 DIAGNOSIS — M81.0 MENOPAUSAL OSTEOPOROSIS: ICD-10-CM

## 2020-08-05 DIAGNOSIS — Z12.31 ENCOUNTER FOR SCREENING MAMMOGRAM FOR BREAST CANCER: Primary | ICD-10-CM

## 2020-08-25 DIAGNOSIS — E78.49 OTHER HYPERLIPIDEMIA: ICD-10-CM

## 2020-08-25 RX ORDER — EZETIMIBE 10 MG/1
10 TABLET ORAL DAILY
Qty: 30 TABLET | Refills: 0 | Status: SHIPPED | OUTPATIENT
Start: 2020-08-25 | End: 2020-09-23 | Stop reason: SDUPTHER

## 2020-09-10 ENCOUNTER — HOSPITAL ENCOUNTER (OUTPATIENT)
Dept: RADIOLOGY | Facility: HOSPITAL | Age: 65
Discharge: HOME | End: 2020-09-10
Attending: FAMILY MEDICINE
Payer: MEDICARE

## 2020-09-10 DIAGNOSIS — M81.0 MENOPAUSAL OSTEOPOROSIS: ICD-10-CM

## 2020-09-10 DIAGNOSIS — Z12.31 ENCOUNTER FOR SCREENING MAMMOGRAM FOR BREAST CANCER: ICD-10-CM

## 2020-09-10 PROCEDURE — 77080 DXA BONE DENSITY AXIAL: CPT | Mod: TC,PO

## 2020-09-10 PROCEDURE — 77067 SCR MAMMO BI INCL CAD: CPT | Mod: TC,PO

## 2020-09-14 DIAGNOSIS — J42 CHRONIC BRONCHITIS, UNSPECIFIED CHRONIC BRONCHITIS TYPE: ICD-10-CM

## 2020-09-14 DIAGNOSIS — I10 ESSENTIAL HYPERTENSION: ICD-10-CM

## 2020-09-14 RX ORDER — LISINOPRIL 20 MG/1
20 TABLET ORAL DAILY
Qty: 30 TABLET | Refills: 0 | Status: SHIPPED | OUTPATIENT
Start: 2020-09-14 | End: 2020-09-23 | Stop reason: SDUPTHER

## 2020-09-22 RX ORDER — HYDROCODONE BITARTRATE AND ACETAMINOPHEN 10; 325 MG/1; MG/1
1 TABLET ORAL EVERY 8 HOURS
COMMUNITY
Start: 2020-09-08 | End: 2022-09-01

## 2020-09-23 ENCOUNTER — OFFICE VISIT (OUTPATIENT)
Dept: FAMILY MEDICINE | Facility: CLINIC | Age: 65
End: 2020-09-23
Payer: MEDICARE

## 2020-09-23 VITALS
WEIGHT: 172.38 LBS | RESPIRATION RATE: 18 BRPM | DIASTOLIC BLOOD PRESSURE: 82 MMHG | OXYGEN SATURATION: 98 % | SYSTOLIC BLOOD PRESSURE: 134 MMHG | TEMPERATURE: 98 F | HEART RATE: 89 BPM | HEIGHT: 67 IN | BODY MASS INDEX: 27.06 KG/M2

## 2020-09-23 DIAGNOSIS — I10 ESSENTIAL HYPERTENSION: ICD-10-CM

## 2020-09-23 DIAGNOSIS — M81.0 OSTEOPOROSIS WITHOUT CURRENT PATHOLOGICAL FRACTURE, UNSPECIFIED OSTEOPOROSIS TYPE: ICD-10-CM

## 2020-09-23 DIAGNOSIS — E78.49 OTHER HYPERLIPIDEMIA: ICD-10-CM

## 2020-09-23 DIAGNOSIS — J30.1 NON-SEASONAL ALLERGIC RHINITIS DUE TO POLLEN: ICD-10-CM

## 2020-09-23 DIAGNOSIS — J42 CHRONIC BRONCHITIS, UNSPECIFIED CHRONIC BRONCHITIS TYPE: Primary | ICD-10-CM

## 2020-09-23 PROCEDURE — 99214 OFFICE O/P EST MOD 30 MIN: CPT | Mod: S$PBB,,, | Performed by: FAMILY MEDICINE

## 2020-09-23 PROCEDURE — 99215 OFFICE O/P EST HI 40 MIN: CPT | Performed by: FAMILY MEDICINE

## 2020-09-23 PROCEDURE — 99214 PR OFFICE/OUTPT VISIT, EST, LEVL IV, 30-39 MIN: ICD-10-PCS | Mod: S$PBB,,, | Performed by: FAMILY MEDICINE

## 2020-09-23 RX ORDER — ALENDRONATE SODIUM 70 MG/1
70 TABLET ORAL
Qty: 4 TABLET | Refills: 5 | Status: SHIPPED | OUTPATIENT
Start: 2020-09-23 | End: 2021-05-26 | Stop reason: SDUPTHER

## 2020-09-23 RX ORDER — TRIAMTERENE/HYDROCHLOROTHIAZID 37.5-25 MG
1 TABLET ORAL DAILY
Qty: 30 TABLET | Refills: 6 | Status: SHIPPED | OUTPATIENT
Start: 2020-09-23 | End: 2021-04-26 | Stop reason: SDUPTHER

## 2020-09-23 RX ORDER — EZETIMIBE 10 MG/1
10 TABLET ORAL DAILY
Qty: 30 TABLET | Refills: 6 | Status: SHIPPED | OUTPATIENT
Start: 2020-09-23 | End: 2021-04-26 | Stop reason: SDUPTHER

## 2020-09-23 RX ORDER — FLUTICASONE PROPIONATE 50 MCG
2 SPRAY, SUSPENSION (ML) NASAL DAILY
Qty: 16 G | Refills: 5 | Status: SHIPPED | OUTPATIENT
Start: 2020-09-23 | End: 2021-05-26 | Stop reason: SDUPTHER

## 2020-09-23 RX ORDER — LISINOPRIL 20 MG/1
20 TABLET ORAL DAILY
Qty: 30 TABLET | Refills: 6 | Status: SHIPPED | OUTPATIENT
Start: 2020-09-23 | End: 2021-05-26 | Stop reason: SDUPTHER

## 2020-09-23 RX ORDER — BUPROPION HYDROCHLORIDE 75 MG/1
75 TABLET ORAL DAILY
Qty: 30 TABLET | Refills: 6 | Status: SHIPPED | OUTPATIENT
Start: 2020-09-23 | End: 2021-05-26 | Stop reason: SDUPTHER

## 2020-09-23 NOTE — PROGRESS NOTES
SUBJECTIVE:    Patient ID: Luz Marina Moses is a 65 y.o. female.    Chief Complaint: Hyperlipidemia and Hypertension  63 yo female here today to follow up on her chronic medical conditions.  She is doing well today and she has no new complaints.        SPMHX:  CPOD: Salmeterol 50mcg, Albuterol 90mcg,   HTN: Lisinopril 20mg, Triamterene HCTZ 37.5/25mg, blood pressure is well controlled  Hyperlipidemia: (Statin Myopathy)  On Zetia 10mg ASCVD is elevate pt continues to decline starting on low dose statin.  Chronic Hep C: Completed treatment  Osteoporosis: Fosamax 70mg (Pt not really taking) Last Dexa was   Allergic Rhinitis: Flonase,  Depression: Wellbutrin 75mg  Fibromyalgia: Gabapentin 600mg   Vertigo: Meclizine 25mg,      Specialists:  Gastro: Dr Johansen  Pain Management: Dr Rucker, Dr Best  ENT: Dr Geronimo        Smoke: 1/2 ppd  ETOH: none  Exercise: None    Lipids: ASCVD risk  16.4%  Chol: 216  Tri  HDL:46  LDL:144      Fasting  Glucose 70 - 110 mg/dL 115High         Hypertension  This is a chronic problem. The current episode started more than 1 year ago. The problem is unchanged. The problem is controlled. Pertinent negatives include no anxiety, blurred vision, chest pain, headaches, malaise/fatigue, neck pain, orthopnea, palpitations or shortness of breath. There are no associated agents to hypertension. Risk factors for coronary artery disease include dyslipidemia, obesity, post-menopausal state, sedentary lifestyle and smoking/tobacco exposure. Past treatments include ACE inhibitors and diuretics. The current treatment provides moderate improvement. Compliance problems include exercise and diet.    Hyperlipidemia  This is a chronic problem. The current episode started more than 1 year ago. The problem is uncontrolled. Recent lipid tests were reviewed and are high. Exacerbating diseases include obesity. Factors aggravating her hyperlipidemia include fatty foods. Pertinent negatives include no chest pain,  focal sensory loss, focal weakness, leg pain or shortness of breath. Current antihyperlipidemic treatment includes ezetimibe. The current treatment provides mild improvement of lipids. Compliance problems include adherence to exercise and adherence to diet.    Depression  Visit Type: follow-up  Patient is not experiencing: decreased concentration, depressed mood, dry mouth, feelings of hopelessness, feelings of worthlessness, palpitations and shortness of breath.  Frequency of symptoms: occasionally   Severity: mild   Sleep quality: fair  Nighttime awakenings: occasional  Compliance with medications:  %          Osteoporosis  She was diagnosed with osteoporosis by bone density scan in 2017. Patient denies history of fracture.The cause of osteoporosis is felt to be due to postmenopausal estrogen deficiency and dietary calcium and/or vitamin D deficiency.   She is not currently being treated with calcium and vitamin D supplementation.  She is currently being treated with bisphosphonates       Past Medical History:   Diagnosis Date    Allergy     Anemia     Anxiety     Arthritis     COPD (chronic obstructive pulmonary disease)     Depression     Encounter for blood transfusion     Encounter for screening mammogram for breast cancer 5/8/2018    Fibromyalgia     currently on disability for fibromyalgia.     H/O fibromyalgia     Heart murmur     Hepatitis C 01/2017    Hyperlipidemia     Hypertension     IBS (irritable bowel syndrome)     Need for hepatitis C screening test 5/8/2018    Osteoarthritis     Osteoporosis     PTSD (post-traumatic stress disorder)      Social History     Socioeconomic History    Marital status:      Spouse name: Not on file    Number of children: 2    Years of education: Not on file    Highest education level: Not on file   Occupational History    Occupation: Disabled-      Comment: PTSD, Archana   Social Needs    Financial resource strain: Not on file     Food insecurity     Worry: Not on file     Inability: Not on file    Transportation needs     Medical: Not on file     Non-medical: Not on file   Tobacco Use    Smoking status: Current Every Day Smoker     Packs/day: 1.50     Years: 40.00     Pack years: 60.00     Start date: 1971    Smokeless tobacco: Never Used   Substance and Sexual Activity    Alcohol use: No     Comment: denies use    Drug use: No    Sexual activity: Not Currently   Lifestyle    Physical activity     Days per week: Not on file     Minutes per session: Not on file    Stress: Only a little   Relationships    Social connections     Talks on phone: Not on file     Gets together: Not on file     Attends Mor[employer] service: Not on file     Active member of club or organization: Not on file     Attends meetings of clubs or organizations: Not on file     Relationship status: Not on file   Other Topics Concern    Not on file   Social History Narrative    Currently on disability for fibromyalgia.         1 step son and 1 son.     Past Surgical History:   Procedure Laterality Date     SECTION      FRACTURE SURGERY      HYSTERECTOMY      partial     LEG SURGERY Left 16    orif    TONSILLECTOMY       Family History   Problem Relation Age of Onset    Arthritis Mother     Kidney disease Mother     Depression Mother     Diabetes Mother     Early death Mother     Hypertension Mother     Mental illness Mother     Miscarriages / Stillbirths Mother     Stroke Sister     Breast cancer Sister     Stroke Brother     Hyperlipidemia Brother     Stroke Sister     Alcohol abuse Brother     Liver disease Brother     Thrombophlebitis Other     Cancer Sister 45        breast     Arthritis Sister     Heart disease Maternal Grandfather      Current Outpatient Medications   Medication Sig Dispense Refill    albuterol (PROVENTIL HFA) 90 mcg/actuation inhaler Inhale 2 puffs into the lungs every 6 (six) hours as needed for  Wheezing. Rescue 18 g 3    alendronate (FOSAMAX) 70 MG tablet Take 1 tablet (70 mg total) by mouth every 7 days. 4 tablet 5    buPROPion (WELLBUTRIN) 75 MG tablet Take 1 tablet (75 mg total) by mouth once daily. 30 tablet 6    ezetimibe (ZETIA) 10 mg tablet Take 1 tablet (10 mg total) by mouth once daily. 30 tablet 6    fluticasone propionate (FLONASE) 50 mcg/actuation nasal spray 2 sprays (100 mcg total) by Each Nostril route once daily. 16 g 5    gabapentin (NEURONTIN) 600 MG tablet Take 600 mg by mouth 3 (three) times daily.      HYDROcodone-acetaminophen (NORCO)  mg per tablet Take 1 tablet by mouth every 8 (eight) hours.      lisinopriL (PRINIVIL,ZESTRIL) 20 MG tablet Take 1 tablet (20 mg total) by mouth once daily. 30 tablet 6    meclizine (ANTIVERT) 25 mg tablet Take 1 tablet (25 mg total) by mouth 3 (three) times daily as needed. 90 tablet 1    morphine (MSIR) 15 MG tablet 15 mg every 6 (six) hours as needed.       NARCAN 4 mg/actuation Spry       promethazine (PHENERGAN) 12.5 MG Tab Take 1 tablet (12.5 mg total) by mouth every 4 (four) hours as needed. 20 tablet 0    salmeteroL (SEREVENT) 50 mcg/dose diskus inhaler Inhale 1 puff into the lungs 2 (two) times daily. Controller 1 each 5    tizanidine (ZANAFLEX) 4 MG tablet Take 4 mg by mouth every 6 (six) hours as needed.      triamterene-hydrochlorothiazide 37.5-25 mg (MAXZIDE-25) 37.5-25 mg per tablet Take 1 tablet by mouth once daily. 30 tablet 6    esomeprazole (NEXIUM PACKET) 40 mg GrPS Take 40 mg by mouth before breakfast. 1200 mg 5     No current facility-administered medications for this visit.      Review of patient's allergies indicates:   Allergen Reactions    Atorvastatin     Red dye Diarrhea and Rash    Spiriva respimat [tiotropium bromide] Anaphylaxis    Augmentin [amoxicillin-pot clavulanate]     Crestor [rosuvastatin] Other (See Comments)     Chest Pain    Lodine [etodolac] Other (See Comments)     Dizziness      "Xarelto [rivaroxaban]      Blister on tongue and soreness all over mouth        Review of Systems   Constitutional: Negative for activity change, appetite change, diaphoresis, fatigue, fever, malaise/fatigue and unexpected weight change.   HENT: Negative for congestion, postnasal drip, rhinorrhea, sinus pressure and sinus pain.    Eyes: Negative for blurred vision.   Respiratory: Negative for cough, chest tightness, shortness of breath and wheezing.    Cardiovascular: Negative for chest pain, palpitations and orthopnea.   Genitourinary: Negative for dysuria, flank pain, frequency and urgency.   Musculoskeletal: Negative for neck pain.   Neurological: Negative for focal weakness and headaches.   Psychiatric/Behavioral: Positive for depression. Negative for decreased concentration and dysphoric mood.          Blood pressure 134/82, pulse 89, temperature 97.9 °F (36.6 °C), resp. rate 18, height 5' 7" (1.702 m), weight 78.2 kg (172 lb 6.4 oz), SpO2 98 %. Body mass index is 27 kg/m².   Objective:      Physical Exam  Constitutional:       General: She is not in acute distress.     Appearance: Normal appearance. She is well-developed. She is not ill-appearing or toxic-appearing.   HENT:      Head: Normocephalic and atraumatic.      Right Ear: External ear normal.      Left Ear: External ear normal.      Nose: Nose normal. No congestion or rhinorrhea.      Mouth/Throat:      Mouth: Mucous membranes are moist.      Pharynx: Oropharynx is clear. No oropharyngeal exudate.   Eyes:      General:         Right eye: No discharge.         Left eye: No discharge.      Conjunctiva/sclera: Conjunctivae normal.      Pupils: Pupils are equal, round, and reactive to light.   Cardiovascular:      Rate and Rhythm: Normal rate and regular rhythm.      Heart sounds: Normal heart sounds. No murmur.   Pulmonary:      Effort: Pulmonary effort is normal. No respiratory distress.      Breath sounds: Normal breath sounds.   Skin:     General: Skin " is warm and dry.      Capillary Refill: Capillary refill takes less than 2 seconds.   Neurological:      Mental Status: She is alert and oriented to person, place, and time.   Psychiatric:         Mood and Affect: Mood normal.         Thought Content: Thought content normal.         Judgment: Judgment normal.             Assessment:       1. Chronic bronchitis, unspecified chronic bronchitis type    2. Essential hypertension    3. Non-seasonal allergic rhinitis due to pollen    4. Osteoporosis without current pathological fracture, unspecified osteoporosis type    5. Other hyperlipidemia         Plan:           Chronic bronchitis, unspecified chronic bronchitis type  -     salmeteroL (SEREVENT) 50 mcg/dose diskus inhaler; Inhale 1 puff into the lungs 2 (two) times daily. Controller  Dispense: 1 each; Refill: 5    Essential hypertension  -     lisinopriL (PRINIVIL,ZESTRIL) 20 MG tablet; Take 1 tablet (20 mg total) by mouth once daily.  Dispense: 30 tablet; Refill: 6  -     triamterene-hydrochlorothiazide 37.5-25 mg (MAXZIDE-25) 37.5-25 mg per tablet; Take 1 tablet by mouth once daily.  Dispense: 30 tablet; Refill: 6    Non-seasonal allergic rhinitis due to pollen  -     fluticasone propionate (FLONASE) 50 mcg/actuation nasal spray; 2 sprays (100 mcg total) by Each Nostril route once daily.  Dispense: 16 g; Refill: 5    Osteoporosis without current pathological fracture, unspecified osteoporosis type  -     alendronate (FOSAMAX) 70 MG tablet; Take 1 tablet (70 mg total) by mouth every 7 days.  Dispense: 4 tablet; Refill: 5    Other hyperlipidemia  -     ezetimibe (ZETIA) 10 mg tablet; Take 1 tablet (10 mg total) by mouth once daily.  Dispense: 30 tablet; Refill: 6    Other orders  -     buPROPion (WELLBUTRIN) 75 MG tablet; Take 1 tablet (75 mg total) by mouth once daily.  Dispense: 30 tablet; Refill: 6

## 2020-10-12 ENCOUNTER — OFFICE VISIT (OUTPATIENT)
Dept: INTERNAL MEDICINE | Facility: CLINIC | Age: 65
End: 2020-10-12
Payer: MEDICARE

## 2020-10-12 VITALS
SYSTOLIC BLOOD PRESSURE: 136 MMHG | RESPIRATION RATE: 18 BRPM | WEIGHT: 171.75 LBS | BODY MASS INDEX: 26.96 KG/M2 | HEIGHT: 67 IN | DIASTOLIC BLOOD PRESSURE: 78 MMHG | HEART RATE: 90 BPM | OXYGEN SATURATION: 95 % | TEMPERATURE: 99 F

## 2020-10-12 DIAGNOSIS — R09.81 NASAL CONGESTION: ICD-10-CM

## 2020-10-12 DIAGNOSIS — J41.0 SIMPLE CHRONIC BRONCHITIS: ICD-10-CM

## 2020-10-12 DIAGNOSIS — I10 ESSENTIAL HYPERTENSION: ICD-10-CM

## 2020-10-12 DIAGNOSIS — R52 BODY ACHES: Primary | ICD-10-CM

## 2020-10-12 LAB
INFLUENZA A, MOLECULAR: NEGATIVE
INFLUENZA B, MOLECULAR: NEGATIVE
SPECIMEN SOURCE: NORMAL

## 2020-10-12 PROCEDURE — 99213 PR OFFICE/OUTPT VISIT, EST, LEVL III, 20-29 MIN: ICD-10-PCS | Mod: S$PBB,,, | Performed by: PHYSICIAN ASSISTANT

## 2020-10-12 PROCEDURE — 87502 INFLUENZA DNA AMP PROBE: CPT

## 2020-10-12 PROCEDURE — 99999 PR PBB SHADOW E&M-EST. PATIENT-LVL V: ICD-10-PCS | Mod: PBBFAC,,, | Performed by: PHYSICIAN ASSISTANT

## 2020-10-12 PROCEDURE — 99213 OFFICE O/P EST LOW 20 MIN: CPT | Mod: S$PBB,,, | Performed by: PHYSICIAN ASSISTANT

## 2020-10-12 PROCEDURE — 99215 OFFICE O/P EST HI 40 MIN: CPT | Mod: PBBFAC | Performed by: PHYSICIAN ASSISTANT

## 2020-10-12 PROCEDURE — 99999 PR PBB SHADOW E&M-EST. PATIENT-LVL V: CPT | Mod: PBBFAC,,, | Performed by: PHYSICIAN ASSISTANT

## 2020-10-12 NOTE — PROGRESS NOTES
"Subjective:      Patient ID: Luz Marina Moses is a 65 y.o. female.    Chief Complaint: Headache, Generalized Body Aches, Fatigue, and Nasal Congestion    Patient is new to me, being seen today for flu-like symptoms x1wk.  Has been taking otc medication with no improvement.     Review of Systems   Constitutional: Positive for diaphoresis, fatigue and fever. Negative for chills.   HENT: Positive for congestion, postnasal drip, sinus pressure, sinus pain and sore throat (resolved). Negative for ear pain and rhinorrhea.    Respiratory: Positive for cough (dry) and chest tightness (using inhalers). Negative for shortness of breath and wheezing.         H/o COPD   Gastrointestinal: Positive for abdominal pain and nausea. Negative for constipation, diarrhea and vomiting.   Musculoskeletal: Positive for myalgias.   Skin: Negative for rash.   Neurological: Positive for weakness and headaches. Negative for dizziness and light-headedness.       Objective:   /78 (BP Location: Right arm, Patient Position: Sitting, BP Method: Medium (Manual))   Pulse 90   Temp 98.8 °F (37.1 °C) (Tympanic)   Resp 18   Ht 5' 7" (1.702 m)   Wt 77.9 kg (171 lb 11.8 oz)   SpO2 95%   BMI 26.90 kg/m²   Physical Exam  Constitutional:       General: She is not in acute distress.     Appearance: Normal appearance. She is well-developed. She is not ill-appearing.   HENT:      Head: Normocephalic and atraumatic.   Cardiovascular:      Rate and Rhythm: Normal rate and regular rhythm.      Heart sounds: Normal heart sounds. No murmur.   Pulmonary:      Effort: Pulmonary effort is normal. No respiratory distress.      Breath sounds: Normal breath sounds. No decreased breath sounds.   Skin:     General: Skin is warm and dry.      Findings: No rash.   Psychiatric:         Speech: Speech normal.         Behavior: Behavior normal.         Thought Content: Thought content normal.       Assessment:      1. Body aches    2. Nasal congestion    3. Simple " chronic bronchitis    4. Essential hypertension       Plan:   Body aches  -     Influenza A & B by Molecular  -     COVID-19 Routine Screening; Future; Expected date: 10/12/2020    Nasal congestion  -     Influenza A & B by Molecular  -     COVID-19 Routine Screening; Future; Expected date: 10/12/2020    Simple chronic bronchitis   Stable, continue use of inhalers     Essential hypertension   Controlled on current regimen    Concern for COVID, symptomatic care at this time      Discussed with patient he/she should self quarantine until we have the results of their test     Discussed worsening signs/symptoms and when to return to clinic or go to ED.   Patient expresses understanding and agrees with treatment plan.

## 2020-10-15 ENCOUNTER — TELEPHONE (OUTPATIENT)
Dept: INTERNAL MEDICINE | Facility: CLINIC | Age: 65
End: 2020-10-15

## 2020-10-15 DIAGNOSIS — B96.89 BACTERIAL SINUSITIS: Primary | ICD-10-CM

## 2020-10-15 DIAGNOSIS — R05.9 COUGH: ICD-10-CM

## 2020-10-15 DIAGNOSIS — J32.9 BACTERIAL SINUSITIS: Primary | ICD-10-CM

## 2020-10-15 NOTE — TELEPHONE ENCOUNTER
----- Message from Raisa Mcmullen sent at 10/15/2020 11:05 AM CDT -----  Contact: pt  The pt request a call concerning her test results and a antibiotic she needs called into her pharmacy, the pt gave no additional info and can be reached at [phone]///[id number]

## 2020-10-15 NOTE — TELEPHONE ENCOUNTER
Patient calling to see if you can call something in since its not the flu or covid. Patient stated she's still having a low grade fever and she was hoping she will be better by the time she goes back to work tomorrow.

## 2020-10-16 RX ORDER — BENZONATATE 200 MG/1
200 CAPSULE ORAL 3 TIMES DAILY PRN
Qty: 30 CAPSULE | Refills: 0 | Status: SHIPPED | OUTPATIENT
Start: 2020-10-16 | End: 2020-10-26

## 2020-10-16 RX ORDER — DOXYCYCLINE HYCLATE 100 MG
100 TABLET ORAL EVERY 12 HOURS
Qty: 14 TABLET | Refills: 0 | Status: SHIPPED | OUTPATIENT
Start: 2020-10-16 | End: 2020-10-23

## 2020-10-16 NOTE — TELEPHONE ENCOUNTER
Given time frame, will send antibiotic.  Have sent note to pharmacy to dispense doxycyline without red dye d/t allergy.  She should take entire course and let us know if symptoms persist.  Will also send cough tablets.

## 2020-10-20 ENCOUNTER — TELEPHONE (OUTPATIENT)
Dept: INTERNAL MEDICINE | Facility: CLINIC | Age: 65
End: 2020-10-20

## 2020-10-20 NOTE — TELEPHONE ENCOUNTER
----- Message from Angela Brower sent at 10/20/2020  9:43 AM CDT -----  Regarding: Call back  Contact: Patient  Patient would like the nurse to give her a call back at  [phone] ([place]) concerning getting a copy of her negative covid-19 test results.

## 2020-10-21 ENCOUNTER — TELEPHONE (OUTPATIENT)
Dept: INTERNAL MEDICINE | Facility: CLINIC | Age: 65
End: 2020-10-21

## 2020-10-21 NOTE — TELEPHONE ENCOUNTER
----- Message from Angela Brower sent at 10/20/2020  9:43 AM CDT -----  Regarding: Call back  Contact: Patient  Patient would like the nurse to give her a call back at  [phone] ([place]) concerning getting a copy of her negative covid-19 test results.

## 2020-11-02 RX ORDER — ONDANSETRON HYDROCHLORIDE 8 MG/1
8 TABLET, FILM COATED ORAL EVERY 8 HOURS PRN
Qty: 20 TABLET | Refills: 0 | Status: SHIPPED | OUTPATIENT
Start: 2020-11-02 | End: 2021-04-21 | Stop reason: SDUPTHER

## 2020-11-06 DIAGNOSIS — M25.562 LEFT KNEE PAIN, UNSPECIFIED CHRONICITY: Primary | ICD-10-CM

## 2020-11-18 ENCOUNTER — PES CALL (OUTPATIENT)
Dept: ADMINISTRATIVE | Facility: CLINIC | Age: 65
End: 2020-11-18

## 2020-12-31 ENCOUNTER — PES CALL (OUTPATIENT)
Dept: ADMINISTRATIVE | Facility: CLINIC | Age: 65
End: 2020-12-31

## 2021-02-01 ENCOUNTER — PES CALL (OUTPATIENT)
Dept: ADMINISTRATIVE | Facility: CLINIC | Age: 66
End: 2021-02-01

## 2021-04-21 DIAGNOSIS — R42 VERTIGO: ICD-10-CM

## 2021-04-24 RX ORDER — ONDANSETRON HYDROCHLORIDE 8 MG/1
8 TABLET, FILM COATED ORAL EVERY 8 HOURS PRN
Qty: 20 TABLET | Refills: 0 | Status: SHIPPED | OUTPATIENT
Start: 2021-04-24 | End: 2021-08-02

## 2021-04-24 RX ORDER — MECLIZINE HYDROCHLORIDE 25 MG/1
25 TABLET ORAL 3 TIMES DAILY PRN
Qty: 90 TABLET | Refills: 1 | Status: SHIPPED | OUTPATIENT
Start: 2021-04-24 | End: 2022-04-06 | Stop reason: SDUPTHER

## 2021-04-26 DIAGNOSIS — I10 ESSENTIAL HYPERTENSION: ICD-10-CM

## 2021-04-26 DIAGNOSIS — E78.49 OTHER HYPERLIPIDEMIA: ICD-10-CM

## 2021-04-26 RX ORDER — TRIAMTERENE/HYDROCHLOROTHIAZID 37.5-25 MG
1 TABLET ORAL DAILY
Qty: 30 TABLET | Refills: 6 | Status: SHIPPED | OUTPATIENT
Start: 2021-04-26 | End: 2021-11-03 | Stop reason: SDUPTHER

## 2021-04-26 RX ORDER — EZETIMIBE 10 MG/1
10 TABLET ORAL DAILY
Qty: 30 TABLET | Refills: 6 | Status: SHIPPED | OUTPATIENT
Start: 2021-04-26 | End: 2021-11-03 | Stop reason: SDUPTHER

## 2021-05-26 DIAGNOSIS — I10 ESSENTIAL HYPERTENSION: ICD-10-CM

## 2021-05-26 DIAGNOSIS — M81.0 OSTEOPOROSIS WITHOUT CURRENT PATHOLOGICAL FRACTURE, UNSPECIFIED OSTEOPOROSIS TYPE: ICD-10-CM

## 2021-05-26 DIAGNOSIS — J42 CHRONIC BRONCHITIS, UNSPECIFIED CHRONIC BRONCHITIS TYPE: ICD-10-CM

## 2021-05-26 DIAGNOSIS — J30.1 NON-SEASONAL ALLERGIC RHINITIS DUE TO POLLEN: ICD-10-CM

## 2021-05-27 RX ORDER — ESOMEPRAZOLE MAGNESIUM 40 MG/1
40 GRANULE, DELAYED RELEASE ORAL
Qty: 1200 MG | Refills: 5 | Status: SHIPPED | OUTPATIENT
Start: 2021-05-27 | End: 2021-06-26

## 2021-05-27 RX ORDER — ALBUTEROL SULFATE 90 UG/1
2 AEROSOL, METERED RESPIRATORY (INHALATION) EVERY 6 HOURS PRN
Qty: 18 G | Refills: 3 | Status: SHIPPED | OUTPATIENT
Start: 2021-05-27 | End: 2022-09-01 | Stop reason: SDUPTHER

## 2021-05-27 RX ORDER — LISINOPRIL 20 MG/1
20 TABLET ORAL DAILY
Qty: 30 TABLET | Refills: 6 | Status: SHIPPED | OUTPATIENT
Start: 2021-05-27 | End: 2021-11-03 | Stop reason: SDUPTHER

## 2021-05-27 RX ORDER — BUPROPION HYDROCHLORIDE 75 MG/1
75 TABLET ORAL DAILY
Qty: 30 TABLET | Refills: 6 | Status: SHIPPED | OUTPATIENT
Start: 2021-05-27 | End: 2022-04-06 | Stop reason: SDUPTHER

## 2021-05-27 RX ORDER — ALENDRONATE SODIUM 70 MG/1
70 TABLET ORAL
Qty: 4 TABLET | Refills: 5 | Status: SHIPPED | OUTPATIENT
Start: 2021-05-27 | End: 2022-01-25 | Stop reason: SDUPTHER

## 2021-05-27 RX ORDER — FLUTICASONE PROPIONATE 50 MCG
2 SPRAY, SUSPENSION (ML) NASAL DAILY
Qty: 16 G | Refills: 5 | Status: SHIPPED | OUTPATIENT
Start: 2021-05-27 | End: 2022-04-06 | Stop reason: SDUPTHER

## 2021-06-22 ENCOUNTER — OFFICE VISIT (OUTPATIENT)
Dept: FAMILY MEDICINE | Facility: CLINIC | Age: 66
End: 2021-06-22
Payer: MEDICARE

## 2021-06-22 VITALS
DIASTOLIC BLOOD PRESSURE: 61 MMHG | RESPIRATION RATE: 20 BRPM | OXYGEN SATURATION: 96 % | HEART RATE: 91 BPM | WEIGHT: 153.94 LBS | TEMPERATURE: 99 F | SYSTOLIC BLOOD PRESSURE: 136 MMHG | BODY MASS INDEX: 24.16 KG/M2 | HEIGHT: 67 IN

## 2021-06-22 DIAGNOSIS — I10 ESSENTIAL HYPERTENSION: ICD-10-CM

## 2021-06-22 DIAGNOSIS — E78.49 OTHER HYPERLIPIDEMIA: ICD-10-CM

## 2021-06-22 DIAGNOSIS — B18.2 CHRONIC HEPATITIS C WITHOUT HEPATIC COMA: Primary | ICD-10-CM

## 2021-06-22 DIAGNOSIS — F17.210 NICOTINE DEPENDENCE, CIGARETTES, UNCOMPLICATED: ICD-10-CM

## 2021-06-22 DIAGNOSIS — F17.210 CIGARETTE SMOKER: ICD-10-CM

## 2021-06-22 DIAGNOSIS — Z12.11 COLON CANCER SCREENING: ICD-10-CM

## 2021-06-22 PROCEDURE — 99214 OFFICE O/P EST MOD 30 MIN: CPT | Mod: S$PBB,,, | Performed by: FAMILY MEDICINE

## 2021-06-22 PROCEDURE — 99214 PR OFFICE/OUTPT VISIT, EST, LEVL IV, 30-39 MIN: ICD-10-PCS | Mod: S$PBB,,, | Performed by: FAMILY MEDICINE

## 2021-06-22 PROCEDURE — 99215 OFFICE O/P EST HI 40 MIN: CPT | Performed by: FAMILY MEDICINE

## 2021-08-02 ENCOUNTER — TELEPHONE (OUTPATIENT)
Dept: INTERNAL MEDICINE | Facility: CLINIC | Age: 66
End: 2021-08-02

## 2021-08-02 ENCOUNTER — OFFICE VISIT (OUTPATIENT)
Dept: INTERNAL MEDICINE | Facility: CLINIC | Age: 66
End: 2021-08-02
Payer: MEDICARE

## 2021-08-02 DIAGNOSIS — J44.1 COPD WITH EXACERBATION: Primary | ICD-10-CM

## 2021-08-02 DIAGNOSIS — R05.9 COUGH: ICD-10-CM

## 2021-08-02 PROCEDURE — 99214 PR OFFICE/OUTPT VISIT, EST, LEVL IV, 30-39 MIN: ICD-10-PCS | Mod: 95,,, | Performed by: PHYSICIAN ASSISTANT

## 2021-08-02 PROCEDURE — 99214 OFFICE O/P EST MOD 30 MIN: CPT | Mod: 95,,, | Performed by: PHYSICIAN ASSISTANT

## 2021-08-02 PROCEDURE — U0003 INFECTIOUS AGENT DETECTION BY NUCLEIC ACID (DNA OR RNA); SEVERE ACUTE RESPIRATORY SYNDROME CORONAVIRUS 2 (SARS-COV-2) (CORONAVIRUS DISEASE [COVID-19]), AMPLIFIED PROBE TECHNIQUE, MAKING USE OF HIGH THROUGHPUT TECHNOLOGIES AS DESCRIBED BY CMS-2020-01-R: HCPCS | Performed by: PHYSICIAN ASSISTANT

## 2021-08-02 PROCEDURE — U0005 INFEC AGEN DETEC AMPLI PROBE: HCPCS | Performed by: PHYSICIAN ASSISTANT

## 2021-08-02 RX ORDER — PROMETHAZINE HYDROCHLORIDE AND DEXTROMETHORPHAN HYDROBROMIDE 6.25; 15 MG/5ML; MG/5ML
5 SYRUP ORAL NIGHTLY PRN
Qty: 120 ML | Refills: 0 | Status: SHIPPED | OUTPATIENT
Start: 2021-08-02 | End: 2021-10-27 | Stop reason: SDUPTHER

## 2021-08-02 RX ORDER — DOXYCYCLINE HYCLATE 100 MG
100 TABLET ORAL EVERY 12 HOURS
Qty: 14 TABLET | Refills: 0 | Status: SHIPPED | OUTPATIENT
Start: 2021-08-02 | End: 2021-08-09

## 2021-08-02 RX ORDER — BENZONATATE 200 MG/1
200 CAPSULE ORAL 3 TIMES DAILY PRN
Qty: 30 CAPSULE | Refills: 0 | Status: SHIPPED | OUTPATIENT
Start: 2021-08-02 | End: 2021-08-12

## 2021-08-03 ENCOUNTER — CLINICAL SUPPORT (OUTPATIENT)
Dept: INTERNAL MEDICINE | Facility: CLINIC | Age: 66
End: 2021-08-03
Payer: MEDICARE

## 2021-08-03 LAB
SARS-COV-2 RNA RESP QL NAA+PROBE: NOT DETECTED
SARS-COV-2- CYCLE NUMBER: -1

## 2021-10-27 ENCOUNTER — OFFICE VISIT (OUTPATIENT)
Dept: INTERNAL MEDICINE | Facility: CLINIC | Age: 66
End: 2021-10-27
Payer: MEDICARE

## 2021-10-27 ENCOUNTER — TELEPHONE (OUTPATIENT)
Dept: PEDIATRICS | Facility: CLINIC | Age: 66
End: 2021-10-27
Payer: MEDICARE

## 2021-10-27 DIAGNOSIS — R05.9 COUGH: ICD-10-CM

## 2021-10-27 PROCEDURE — 99213 OFFICE O/P EST LOW 20 MIN: CPT | Mod: 95,,, | Performed by: PEDIATRICS

## 2021-10-27 PROCEDURE — 99213 PR OFFICE/OUTPT VISIT, EST, LEVL III, 20-29 MIN: ICD-10-PCS | Mod: 95,,, | Performed by: PEDIATRICS

## 2021-10-27 RX ORDER — PROMETHAZINE HYDROCHLORIDE AND DEXTROMETHORPHAN HYDROBROMIDE 6.25; 15 MG/5ML; MG/5ML
5 SYRUP ORAL NIGHTLY PRN
Qty: 120 ML | Refills: 0 | Status: SHIPPED | OUTPATIENT
Start: 2021-10-27 | End: 2021-11-01

## 2021-10-29 ENCOUNTER — LAB VISIT (OUTPATIENT)
Dept: PRIMARY CARE CLINIC | Facility: OTHER | Age: 66
End: 2021-10-29
Attending: INTERNAL MEDICINE
Payer: MEDICARE

## 2021-10-29 DIAGNOSIS — Z20.822 ENCOUNTER FOR LABORATORY TESTING FOR COVID-19 VIRUS: ICD-10-CM

## 2021-10-29 PROCEDURE — U0003 INFECTIOUS AGENT DETECTION BY NUCLEIC ACID (DNA OR RNA); SEVERE ACUTE RESPIRATORY SYNDROME CORONAVIRUS 2 (SARS-COV-2) (CORONAVIRUS DISEASE [COVID-19]), AMPLIFIED PROBE TECHNIQUE, MAKING USE OF HIGH THROUGHPUT TECHNOLOGIES AS DESCRIBED BY CMS-2020-01-R: HCPCS | Performed by: INTERNAL MEDICINE

## 2021-10-30 DIAGNOSIS — U07.1 COVID-19 VIRUS DETECTED: ICD-10-CM

## 2021-10-30 LAB
SARS-COV-2 RNA RESP QL NAA+PROBE: DETECTED
SARS-COV-2- CYCLE NUMBER: 22

## 2021-10-31 ENCOUNTER — NURSE TRIAGE (OUTPATIENT)
Dept: ADMINISTRATIVE | Facility: CLINIC | Age: 66
End: 2021-10-31
Payer: MEDICARE

## 2021-11-01 ENCOUNTER — OFFICE VISIT (OUTPATIENT)
Dept: FAMILY MEDICINE | Facility: CLINIC | Age: 66
End: 2021-11-01
Payer: MEDICARE

## 2021-11-01 ENCOUNTER — TELEPHONE (OUTPATIENT)
Dept: FAMILY MEDICINE | Facility: CLINIC | Age: 66
End: 2021-11-01

## 2021-11-01 DIAGNOSIS — U07.1 COVID-19 VIRUS DETECTED: Primary | ICD-10-CM

## 2021-11-01 DIAGNOSIS — U07.1 COVID-19 VIRUS INFECTION: Primary | ICD-10-CM

## 2021-11-01 DIAGNOSIS — R05.9 COUGH: ICD-10-CM

## 2021-11-01 PROCEDURE — 99213 OFFICE O/P EST LOW 20 MIN: CPT | Mod: CR,95,, | Performed by: NURSE PRACTITIONER

## 2021-11-01 PROCEDURE — 99213 PR OFFICE/OUTPT VISIT, EST, LEVL III, 20-29 MIN: ICD-10-PCS | Mod: CR,95,, | Performed by: NURSE PRACTITIONER

## 2021-11-01 RX ORDER — BENZONATATE 100 MG/1
100 CAPSULE ORAL 3 TIMES DAILY PRN
Qty: 30 CAPSULE | Refills: 0 | Status: SHIPPED | OUTPATIENT
Start: 2021-11-01 | End: 2022-09-01

## 2021-11-02 ENCOUNTER — PATIENT OUTREACH (OUTPATIENT)
Dept: INFECTIOUS DISEASES | Facility: HOSPITAL | Age: 66
End: 2021-11-02
Payer: MEDICARE

## 2021-11-03 ENCOUNTER — NURSE TRIAGE (OUTPATIENT)
Dept: ADMINISTRATIVE | Facility: CLINIC | Age: 66
End: 2021-11-03
Payer: MEDICARE

## 2021-11-03 DIAGNOSIS — E78.49 OTHER HYPERLIPIDEMIA: ICD-10-CM

## 2021-11-03 DIAGNOSIS — I10 ESSENTIAL HYPERTENSION: ICD-10-CM

## 2021-11-03 RX ORDER — HYDROGEN PEROXIDE 3 %
20 SOLUTION, NON-ORAL MISCELLANEOUS
COMMUNITY
End: 2021-11-03 | Stop reason: SDUPTHER

## 2021-11-06 RX ORDER — EZETIMIBE 10 MG/1
10 TABLET ORAL DAILY
Qty: 30 TABLET | Refills: 6 | Status: SHIPPED | OUTPATIENT
Start: 2021-11-06 | End: 2022-07-11 | Stop reason: SDUPTHER

## 2021-11-06 RX ORDER — LISINOPRIL 20 MG/1
20 TABLET ORAL DAILY
Qty: 30 TABLET | Refills: 6 | Status: SHIPPED | OUTPATIENT
Start: 2021-11-06 | End: 2022-07-04

## 2021-11-06 RX ORDER — HYDROGEN PEROXIDE 3 %
20 SOLUTION, NON-ORAL MISCELLANEOUS
Qty: 30 CAPSULE | Refills: 0 | Status: SHIPPED | OUTPATIENT
Start: 2021-11-06 | End: 2022-01-05

## 2021-11-06 RX ORDER — TRIAMTERENE/HYDROCHLOROTHIAZID 37.5-25 MG
1 TABLET ORAL DAILY
Qty: 30 TABLET | Refills: 6 | Status: SHIPPED | OUTPATIENT
Start: 2021-11-06 | End: 2022-07-11 | Stop reason: SDUPTHER

## 2021-11-11 ENCOUNTER — OFFICE VISIT (OUTPATIENT)
Dept: FAMILY MEDICINE | Facility: CLINIC | Age: 66
End: 2021-11-11
Payer: MEDICARE

## 2021-11-11 DIAGNOSIS — U07.1 COVID-19 VIRUS INFECTION: Primary | ICD-10-CM

## 2021-11-11 DIAGNOSIS — R05.9 COUGH: ICD-10-CM

## 2021-11-11 PROCEDURE — 99213 PR OFFICE/OUTPT VISIT, EST, LEVL III, 20-29 MIN: ICD-10-PCS | Mod: CR,95,, | Performed by: NURSE PRACTITIONER

## 2021-11-11 PROCEDURE — 99213 OFFICE O/P EST LOW 20 MIN: CPT | Mod: CR,95,, | Performed by: NURSE PRACTITIONER

## 2022-01-25 ENCOUNTER — OFFICE VISIT (OUTPATIENT)
Dept: FAMILY MEDICINE | Facility: CLINIC | Age: 67
End: 2022-01-25
Payer: MEDICARE

## 2022-01-25 VITALS
DIASTOLIC BLOOD PRESSURE: 84 MMHG | HEIGHT: 66 IN | SYSTOLIC BLOOD PRESSURE: 138 MMHG | BODY MASS INDEX: 22.66 KG/M2 | WEIGHT: 141 LBS | HEART RATE: 78 BPM | OXYGEN SATURATION: 99 %

## 2022-01-25 DIAGNOSIS — I10 ESSENTIAL HYPERTENSION: ICD-10-CM

## 2022-01-25 DIAGNOSIS — D64.9 ANEMIA, UNSPECIFIED TYPE: ICD-10-CM

## 2022-01-25 DIAGNOSIS — Z23 NEED FOR PROPHYLACTIC VACCINATION WITH TETANUS-DIPHTHERIA (TD): Primary | ICD-10-CM

## 2022-01-25 DIAGNOSIS — E78.2 MIXED DYSLIPIDEMIA: ICD-10-CM

## 2022-01-25 DIAGNOSIS — M81.0 OSTEOPOROSIS WITHOUT CURRENT PATHOLOGICAL FRACTURE, UNSPECIFIED OSTEOPOROSIS TYPE: ICD-10-CM

## 2022-01-25 PROCEDURE — 99213 OFFICE O/P EST LOW 20 MIN: CPT | Mod: S$PBB,,, | Performed by: FAMILY MEDICINE

## 2022-01-25 PROCEDURE — 90714 TD VACC NO PRESV 7 YRS+ IM: CPT | Mod: PBBFAC,GZ | Performed by: FAMILY MEDICINE

## 2022-01-25 PROCEDURE — 99215 OFFICE O/P EST HI 40 MIN: CPT | Mod: 25 | Performed by: FAMILY MEDICINE

## 2022-01-25 PROCEDURE — 99213 PR OFFICE/OUTPT VISIT, EST, LEVL III, 20-29 MIN: ICD-10-PCS | Mod: S$PBB,,, | Performed by: FAMILY MEDICINE

## 2022-01-25 RX ORDER — ALENDRONATE SODIUM 70 MG/1
70 TABLET ORAL
Qty: 4 TABLET | Refills: 5 | Status: SHIPPED | OUTPATIENT
Start: 2022-01-25 | End: 2022-09-01 | Stop reason: SDUPTHER

## 2022-01-25 NOTE — PROGRESS NOTES
SUBJECTIVE:    Patient ID: Luz Marina Moses is a 66 y.o. female.    Chief Complaint: Hypertension  63 yo female here today  to follow-up on her hypertension her blood pressure is  well controlled today     Reviewed her overdue health maintenance patient is due for a pneumococcal vaccine, COVID-19 vaccine, shingles vaccine, colorectal cancer screening, influenza vaccine and a low-dose lung CT scan..     SPMHX:  CPOD: Salmeterol 50mcg, Albuterol 90mcg,   HTN: Lisinopril 20mg, Triamterene HCTZ 37.5/25mg, blood pressure is not well controlled  Hyperlipidemia: (Statin Myopathy)  On Zetia 10mg ASCVD is elevate pt continues to decline starting on low dose statin.  Chronic Hep C: Completed treatment  Osteoporosis: Fosamax 70mg (Pt not really taking) Last Dexa was 2020  Allergic Rhinitis: Flonase,  Depression: Wellbutrin 75mg  Fibromyalgia: Gabapentin 600mg   Vertigo: Meclizine 25mg,      Specialists:  Gastro: Dr Johansen  Pain Management: Dr Rucker, Dr Best  ENT: Dr Geronimo  Rheum: No longer seeing      Smoke: 1/2 ppd  ETOH: none  Exercise: None    Hypertension  This is a chronic problem. The current episode started more than 1 year ago. The problem is uncontrolled. Pertinent negatives include no anxiety, blurred vision, chest pain, headaches, malaise/fatigue, neck pain, orthopnea, palpitations, peripheral edema, PND, shortness of breath or sweats. There are no associated agents to hypertension. Risk factors for coronary artery disease include dyslipidemia, smoking/tobacco exposure, sedentary lifestyle and post-menopausal state. Past treatments include ACE inhibitors and diuretics. The current treatment provides mild improvement. Compliance problems include exercise.          Past Medical History:   Diagnosis Date    Allergy     Anemia     Anxiety     Arthritis     COPD (chronic obstructive pulmonary disease)     Depression     Encounter for blood transfusion     Encounter for screening mammogram for breast cancer  2018    Fibromyalgia     currently on disability for fibromyalgia.     H/O fibromyalgia     Heart murmur     Hepatitis C 2017    Hyperlipidemia     Hypertension     IBS (irritable bowel syndrome)     Need for hepatitis C screening test 2018    Osteoarthritis     Osteoporosis     PTSD (post-traumatic stress disorder)      Social History     Socioeconomic History    Marital status:     Number of children: 2   Occupational History    Occupation: Disabled-      Comment: PTSD, [place]   Tobacco Use    Smoking status: Current Every Day Smoker     Packs/day: 1.50     Years: 40.00     Pack years: 60.00     Start date: 1971    Smokeless tobacco: Never Used   Substance and Sexual Activity    Alcohol use: No     Comment: denies use    Drug use: No    Sexual activity: Not Currently   Social History Narrative    Currently on disability for fibromyalgia.         1 step son and 1 son.     Past Surgical History:   Procedure Laterality Date     SECTION      FRACTURE SURGERY      HYSTERECTOMY      partial     LEG SURGERY Left 16    orif    TONSILLECTOMY       Family History   Problem Relation Age of Onset    Arthritis Mother     Kidney disease Mother     Depression Mother     Diabetes Mother     Early death Mother     Hypertension Mother     Mental illness Mother     Miscarriages / Stillbirths Mother     Stroke Sister     Breast cancer Sister     Stroke Brother     Hyperlipidemia Brother     Stroke Sister     Alcohol abuse Brother     Liver disease Brother     Thrombophlebitis Other     Cancer Sister 45        breast     Arthritis Sister     Heart disease Maternal Grandfather      Current Outpatient Medications   Medication Sig Dispense Refill    albuterol (PROVENTIL HFA) 90 mcg/actuation inhaler Inhale 2 puffs into the lungs every 6 (six) hours as needed for Wheezing. Rescue 18 g 3    benzonatate (TESSALON) 100 MG capsule Take 1 capsule (100 mg total)  by mouth 3 (three) times daily as needed for Cough. 30 capsule 0    buPROPion (WELLBUTRIN) 75 MG tablet Take 1 tablet (75 mg total) by mouth once daily. 30 tablet 6    esomeprazole (NEXIUM) 20 MG capsule TAKE 1 CAPSULE (20 MG TOTAL) BY MOUTH BEFORE BREAKFAST. 30 capsule 0    ezetimibe (ZETIA) 10 mg tablet Take 1 tablet (10 mg total) by mouth once daily. 30 tablet 6    fluticasone propionate (FLONASE) 50 mcg/actuation nasal spray 2 sprays (100 mcg total) by Each Nostril route once daily. 16 g 5    gabapentin (NEURONTIN) 600 MG tablet Take 600 mg by mouth 3 (three) times daily.      HYDROcodone-acetaminophen (NORCO)  mg per tablet Take 1 tablet by mouth every 8 (eight) hours.      lisinopriL (PRINIVIL,ZESTRIL) 20 MG tablet Take 1 tablet (20 mg total) by mouth once daily. 30 tablet 6    meclizine (ANTIVERT) 25 mg tablet Take 1 tablet (25 mg total) by mouth 3 (three) times daily as needed. 90 tablet 1    NARCAN 4 mg/actuation Spry       pulse oximeter (PULSE OXIMETER) device by Apply Externally route 2 (two) times a day. Use twice daily at 8 AM and 3 PM and record the value in [facility] as directed. 1 each 0    SEREVENT DISKUS 50 mcg/dose diskus inhaler INHALE 1 PUFF INTO THE LUNGS 2 (TWO) TIMES DAILY. CONTROLLER 60 each 4    tizanidine (ZANAFLEX) 4 MG tablet Take 4 mg by mouth every 6 (six) hours as needed.      triamterene-hydrochlorothiazide 37.5-25 mg (MAXZIDE-25) 37.5-25 mg per tablet Take 1 tablet by mouth once daily. 30 tablet 6    alendronate (FOSAMAX) 70 MG tablet Take 1 tablet (70 mg total) by mouth every 7 days. 4 tablet 5     Current Facility-Administered Medications   Medication Dose Route Frequency Provider Last Rate Last Admin    EPINEPHrine (EPIPEN) 0.3 mg/0.3 mL pen injection 0.3 mg  0.3 mg Intramuscular PRN BRENNA Goodrich         Review of patient's allergies indicates:   Allergen Reactions    Atorvastatin     Red dye Diarrhea and Rash    Spiriva respimat [tiotropium  "bromide] Anaphylaxis    Augmentin [amoxicillin-pot clavulanate]     Crestor [rosuvastatin] Other (See Comments)     Chest Pain    Lodine [etodolac] Other (See Comments)     Dizziness     Xarelto [rivaroxaban]      Blister on tongue and soreness all over mouth        Review of Systems   Constitutional: Negative for activity change, appetite change, fatigue, malaise/fatigue and unexpected weight change.   HENT: Negative for congestion, ear pain, hearing loss, postnasal drip, sinus pressure, sinus pain, sneezing and sore throat.    Eyes: Negative for blurred vision, photophobia and pain.   Respiratory: Negative for cough, chest tightness, shortness of breath and wheezing.    Cardiovascular: Negative for chest pain, palpitations, orthopnea, leg swelling and PND.   Gastrointestinal: Negative for abdominal distention, abdominal pain, blood in stool, constipation, diarrhea, nausea and vomiting.   Endocrine: Negative for cold intolerance, heat intolerance, polydipsia and polyuria.   Genitourinary: Negative for difficulty urinating, dysuria, flank pain, frequency, hematuria, pelvic pain and urgency.   Musculoskeletal: Negative for arthralgias, back pain, joint swelling and neck pain.   Skin: Negative for pallor.   Allergic/Immunologic: Negative for environmental allergies and food allergies.   Neurological: Negative for dizziness, weakness, light-headedness, numbness and headaches.   Hematological: Does not bruise/bleed easily.   Psychiatric/Behavioral: Negative for agitation, confusion, decreased concentration and sleep disturbance. The patient is not nervous/anxious.           Blood pressure 138/84, pulse 78, height 5' 6" (1.676 m), weight 64 kg (141 lb), SpO2 99 %. Body mass index is 22.76 kg/m².   Objective:      Physical Exam  Vitals reviewed.   Constitutional:       General: She is not in acute distress.     Appearance: Normal appearance. She is well-developed and well-nourished. She is not ill-appearing or " toxic-appearing.   HENT:      Head: Normocephalic and atraumatic.      Right Ear: External ear normal.      Left Ear: External ear normal.   Eyes:      General:         Right eye: No discharge.         Left eye: No discharge.      Conjunctiva/sclera: Conjunctivae normal.      Pupils: Pupils are equal, round, and reactive to light.   Cardiovascular:      Rate and Rhythm: Normal rate and regular rhythm.      Heart sounds: Normal heart sounds. No murmur heard.      Pulmonary:      Effort: Pulmonary effort is normal. No respiratory distress.      Breath sounds: Normal breath sounds.   Skin:     General: Skin is warm and dry.   Neurological:      Mental Status: She is alert.             Assessment:       1. Need for prophylactic vaccination with tetanus-diphtheria (Td)    2. Osteoporosis without current pathological fracture, unspecified osteoporosis type    3. Mixed dyslipidemia    4. Essential hypertension    5. Anemia, unspecified type         Plan:           Need for prophylactic vaccination with tetanus-diphtheria (Td)    Osteoporosis without current pathological fracture, unspecified osteoporosis type  -     alendronate (FOSAMAX) 70 MG tablet; Take 1 tablet (70 mg total) by mouth every 7 days.  Dispense: 4 tablet; Refill: 5    Mixed dyslipidemia  -     Lipid Panel; Future; Expected date: 01/25/2022    Essential hypertension  -     Comprehensive Metabolic Panel; Future; Expected date: 01/25/2022    Anemia, unspecified type  -     CBC auto differential; Future; Expected date: 01/25/2022    Other orders  -     (In Office Administered) Td Vaccine - Preservative Free

## 2022-02-03 ENCOUNTER — TELEPHONE (OUTPATIENT)
Dept: FAMILY MEDICINE | Facility: CLINIC | Age: 67
End: 2022-02-03
Payer: MEDICARE

## 2022-02-03 DIAGNOSIS — K21.9 GASTROESOPHAGEAL REFLUX DISEASE WITHOUT ESOPHAGITIS: Primary | ICD-10-CM

## 2022-02-03 RX ORDER — HYDROGEN PEROXIDE 3 %
20 SOLUTION, NON-ORAL MISCELLANEOUS
Qty: 30 CAPSULE | Refills: 0 | Status: SHIPPED | OUTPATIENT
Start: 2022-02-03 | End: 2022-05-09 | Stop reason: SDUPTHER

## 2022-02-04 ENCOUNTER — TELEPHONE (OUTPATIENT)
Dept: FAMILY MEDICINE | Facility: CLINIC | Age: 67
End: 2022-02-04
Payer: MEDICARE

## 2022-02-04 NOTE — TELEPHONE ENCOUNTER
The following medication needs a prior authorization:     Medication Name: esomeprazole    Dosage: 20 mg    Frequency: daily    Directions for use: Take 1 capsule by mouth before breakfast    Diagnosis: gastroesophageal reflux disease without esophagitis    Is the request for a reauthorization? no    Is the patient currently stable on therapy?     Please list all therapeutic alternatives previously used with start/end dates and outcome:

## 2022-04-06 DIAGNOSIS — J30.1 NON-SEASONAL ALLERGIC RHINITIS DUE TO POLLEN: ICD-10-CM

## 2022-04-06 DIAGNOSIS — R42 VERTIGO: ICD-10-CM

## 2022-04-06 NOTE — TELEPHONE ENCOUNTER
Patient left voice mail regarding refills requested last week for meclizine and zofran. She now needs her bupropion and flonase.

## 2022-04-07 RX ORDER — BUPROPION HYDROCHLORIDE 75 MG/1
75 TABLET ORAL DAILY
Qty: 30 TABLET | Refills: 6 | Status: SHIPPED | OUTPATIENT
Start: 2022-04-07 | End: 2022-09-01

## 2022-04-07 RX ORDER — MECLIZINE HYDROCHLORIDE 25 MG/1
25 TABLET ORAL 3 TIMES DAILY PRN
Qty: 90 TABLET | Refills: 1 | Status: SHIPPED | OUTPATIENT
Start: 2022-04-07 | End: 2022-09-01 | Stop reason: SDUPTHER

## 2022-04-07 RX ORDER — FLUTICASONE PROPIONATE 50 MCG
2 SPRAY, SUSPENSION (ML) NASAL DAILY
Qty: 16 G | Refills: 5 | Status: SHIPPED | OUTPATIENT
Start: 2022-04-07 | End: 2022-09-01 | Stop reason: SDUPTHER

## 2022-04-11 ENCOUNTER — PATIENT MESSAGE (OUTPATIENT)
Dept: RESEARCH | Facility: HOSPITAL | Age: 67
End: 2022-04-11
Payer: MEDICARE

## 2022-05-09 DIAGNOSIS — K21.9 GASTROESOPHAGEAL REFLUX DISEASE WITHOUT ESOPHAGITIS: ICD-10-CM

## 2022-05-09 NOTE — TELEPHONE ENCOUNTER
Radha Morfin Staff  Pt called requesting a refill on Zofran and Nexium be sent to [facility] in [place].

## 2022-05-10 RX ORDER — HYDROGEN PEROXIDE 3 %
20 SOLUTION, NON-ORAL MISCELLANEOUS
Qty: 30 CAPSULE | Refills: 0 | Status: SHIPPED | OUTPATIENT
Start: 2022-05-10 | End: 2022-09-01 | Stop reason: SDUPTHER

## 2022-07-11 DIAGNOSIS — I10 ESSENTIAL HYPERTENSION: ICD-10-CM

## 2022-07-11 DIAGNOSIS — Z12.31 ENCOUNTER FOR SCREENING MAMMOGRAM FOR MALIGNANT NEOPLASM OF BREAST: ICD-10-CM

## 2022-07-11 DIAGNOSIS — E78.49 OTHER HYPERLIPIDEMIA: ICD-10-CM

## 2022-07-11 DIAGNOSIS — D64.9 ANEMIA, UNSPECIFIED TYPE: Primary | ICD-10-CM

## 2022-07-11 NOTE — TELEPHONE ENCOUNTER
Patient requested labs be sent to [facility] in [place] because she will be there next week, she would also like her mammogram order sent there as well.

## 2022-07-13 RX ORDER — TRIAMTERENE/HYDROCHLOROTHIAZID 37.5-25 MG
1 TABLET ORAL DAILY
Qty: 30 TABLET | Refills: 6 | Status: SHIPPED | OUTPATIENT
Start: 2022-07-13 | End: 2022-09-01 | Stop reason: SDUPTHER

## 2022-07-13 RX ORDER — EZETIMIBE 10 MG/1
10 TABLET ORAL DAILY
Qty: 30 TABLET | Refills: 6 | Status: SHIPPED | OUTPATIENT
Start: 2022-07-13 | End: 2022-09-01 | Stop reason: SDUPTHER

## 2022-08-31 DIAGNOSIS — J42 CHRONIC BRONCHITIS, UNSPECIFIED CHRONIC BRONCHITIS TYPE: ICD-10-CM

## 2022-08-31 RX ORDER — SALMETEROL XINAFOATE 50 UG/1
1 POWDER, METERED ORAL; RESPIRATORY (INHALATION) 2 TIMES DAILY
Qty: 60 EACH | Refills: 4 | Status: SHIPPED | OUTPATIENT
Start: 2022-08-31 | End: 2023-02-22 | Stop reason: SDUPTHER

## 2022-09-01 ENCOUNTER — OFFICE VISIT (OUTPATIENT)
Dept: FAMILY MEDICINE | Facility: CLINIC | Age: 67
End: 2022-09-01
Payer: MEDICARE

## 2022-09-01 VITALS
SYSTOLIC BLOOD PRESSURE: 122 MMHG | HEIGHT: 66 IN | TEMPERATURE: 98 F | DIASTOLIC BLOOD PRESSURE: 64 MMHG | HEART RATE: 103 BPM | WEIGHT: 148.63 LBS | OXYGEN SATURATION: 97 % | BODY MASS INDEX: 23.89 KG/M2

## 2022-09-01 DIAGNOSIS — Z87.891 PERSONAL HISTORY OF NICOTINE DEPENDENCE: ICD-10-CM

## 2022-09-01 DIAGNOSIS — J30.1 NON-SEASONAL ALLERGIC RHINITIS DUE TO POLLEN: ICD-10-CM

## 2022-09-01 DIAGNOSIS — Z12.31 ENCOUNTER FOR SCREENING MAMMOGRAM FOR MALIGNANT NEOPLASM OF BREAST: ICD-10-CM

## 2022-09-01 DIAGNOSIS — K14.8 TONGUE MASS: ICD-10-CM

## 2022-09-01 DIAGNOSIS — J42 CHRONIC BRONCHITIS, UNSPECIFIED CHRONIC BRONCHITIS TYPE: ICD-10-CM

## 2022-09-01 DIAGNOSIS — R42 VERTIGO: ICD-10-CM

## 2022-09-01 DIAGNOSIS — K21.9 GASTROESOPHAGEAL REFLUX DISEASE WITHOUT ESOPHAGITIS: ICD-10-CM

## 2022-09-01 DIAGNOSIS — Z12.11 COLON CANCER SCREENING: Primary | ICD-10-CM

## 2022-09-01 DIAGNOSIS — I10 ESSENTIAL HYPERTENSION: ICD-10-CM

## 2022-09-01 DIAGNOSIS — F17.200 SMOKER: ICD-10-CM

## 2022-09-01 DIAGNOSIS — E78.49 OTHER HYPERLIPIDEMIA: ICD-10-CM

## 2022-09-01 DIAGNOSIS — M81.0 OSTEOPOROSIS WITHOUT CURRENT PATHOLOGICAL FRACTURE, UNSPECIFIED OSTEOPOROSIS TYPE: ICD-10-CM

## 2022-09-01 PROCEDURE — 99215 OFFICE O/P EST HI 40 MIN: CPT | Performed by: FAMILY MEDICINE

## 2022-09-01 PROCEDURE — 99214 OFFICE O/P EST MOD 30 MIN: CPT | Mod: S$PBB,AQ,, | Performed by: FAMILY MEDICINE

## 2022-09-01 PROCEDURE — 99214 PR OFFICE/OUTPT VISIT, EST, LEVL IV, 30-39 MIN: ICD-10-PCS | Mod: S$PBB,AQ,, | Performed by: FAMILY MEDICINE

## 2022-09-01 RX ORDER — BUPROPION HYDROCHLORIDE 75 MG/1
75 TABLET ORAL 2 TIMES DAILY
Qty: 60 TABLET | Refills: 11 | Status: SHIPPED | OUTPATIENT
Start: 2022-09-01 | End: 2023-09-14

## 2022-09-01 RX ORDER — FLUTICASONE PROPIONATE 50 MCG
2 SPRAY, SUSPENSION (ML) NASAL DAILY
Qty: 16 G | Refills: 5 | Status: SHIPPED | OUTPATIENT
Start: 2022-09-01 | End: 2023-02-22 | Stop reason: SDUPTHER

## 2022-09-01 RX ORDER — TRIAMTERENE/HYDROCHLOROTHIAZID 37.5-25 MG
1 TABLET ORAL DAILY
Qty: 90 TABLET | Refills: 1 | Status: SHIPPED | OUTPATIENT
Start: 2022-09-01 | End: 2023-02-15 | Stop reason: SDUPTHER

## 2022-09-01 RX ORDER — ALENDRONATE SODIUM 70 MG/1
70 TABLET ORAL
Qty: 12 TABLET | Refills: 3 | Status: SHIPPED | OUTPATIENT
Start: 2022-09-01 | End: 2023-03-28 | Stop reason: SDUPTHER

## 2022-09-01 RX ORDER — EZETIMIBE 10 MG/1
10 TABLET ORAL DAILY
Qty: 90 TABLET | Refills: 1 | Status: SHIPPED | OUTPATIENT
Start: 2022-09-01 | End: 2023-02-22

## 2022-09-01 RX ORDER — MECLIZINE HYDROCHLORIDE 25 MG/1
25 TABLET ORAL 3 TIMES DAILY PRN
Qty: 90 TABLET | Refills: 1 | Status: SHIPPED | OUTPATIENT
Start: 2022-09-01 | End: 2023-11-17 | Stop reason: SDUPTHER

## 2022-09-01 RX ORDER — METHOCARBAMOL 750 MG/1
750 TABLET, FILM COATED ORAL 2 TIMES DAILY
COMMUNITY
Start: 2022-08-12

## 2022-09-01 RX ORDER — ALBUTEROL SULFATE 90 UG/1
2 AEROSOL, METERED RESPIRATORY (INHALATION) EVERY 6 HOURS PRN
Qty: 18 G | Refills: 3 | Status: SHIPPED | OUTPATIENT
Start: 2022-09-01 | End: 2023-05-02 | Stop reason: SDUPTHER

## 2022-09-01 RX ORDER — OXYCODONE AND ACETAMINOPHEN 10; 325 MG/1; MG/1
1 TABLET ORAL EVERY 8 HOURS PRN
COMMUNITY
Start: 2022-08-18

## 2022-09-01 RX ORDER — HYDROGEN PEROXIDE 3 %
20 SOLUTION, NON-ORAL MISCELLANEOUS
Qty: 90 CAPSULE | Refills: 1 | Status: SHIPPED | OUTPATIENT
Start: 2022-09-01 | End: 2023-02-16

## 2022-09-01 RX ORDER — LISINOPRIL 20 MG/1
20 TABLET ORAL DAILY
Qty: 90 TABLET | Refills: 1 | Status: SHIPPED | OUTPATIENT
Start: 2022-09-01 | End: 2023-02-22

## 2022-09-01 RX ORDER — PREGABALIN 100 MG/1
100-300 CAPSULE ORAL NIGHTLY
COMMUNITY
Start: 2022-08-19

## 2022-09-01 NOTE — PROGRESS NOTES
SUBJECTIVE:    Patient ID: Luz Marina Moses is a 67 y.o. female.    Chief Complaint: Follow-up and Hypertension  66 yo female here today  to follow-up on her hypertension, GERD, depression, allergic rhinitis,  her blood pressure is  well controlled today     Reviewed her overdue health maintenance patient is due for COVID-19 vaccine, pneumococcal vaccine, colorectal cancer screening, low-dose lung CT scan, shingles vaccine, lipid panel, influenza vaccine, mammogram.     SPMHX:  CPOD: Salmeterol 50mcg, Albuterol 90mcg,   HTN: Lisinopril 20mg, Triamterene HCTZ 37.5/25mg, blood pressure is not well controlled  Hyperlipidemia: (Statin Myopathy)  On Zetia 10mg ASCVD is elevate pt continues to decline starting on low dose statin.  Chronic Hep C: Completed treatment  Osteoporosis: Fosamax 70mg (Pt not really taking) Last Dexa was 2020  Allergic Rhinitis: Flonase,  Depression: Wellbutrin 75mg  Fibromyalgia: Gabapentin 600mg   Vertigo: Meclizine 25mg,      Specialists:  Gastro: Dr Johansen  Pain Management: Dr Rodas  ENT:   Rheum: No longer seeing      Smoke: 1/2 ppd  ETOH: none  Exercise: None    Hypertension  This is a chronic problem. The current episode started more than 1 year ago. The problem is unchanged. The problem is controlled. Pertinent negatives include no anxiety, blurred vision, chest pain, headaches, malaise/fatigue, neck pain, orthopnea, palpitations, peripheral edema, PND, shortness of breath or sweats. There are no associated agents to hypertension. Risk factors for coronary artery disease include dyslipidemia, male gender and sedentary lifestyle. Past treatments include ACE inhibitors and diuretics. The current treatment provides moderate improvement.   Gastroesophageal Reflux  She complains of heartburn. She reports no abdominal pain, no belching, no chest pain, no choking, no coughing, no dysphagia, no early satiety, no globus sensation, no hoarse voice, no nausea, no sore throat, no stridor, no tooth  decay, no water brash or no wheezing. This is a recurrent problem. The current episode started more than 1 year ago. Pertinent negatives include no fatigue or weight loss. She has tried a PPI for the symptoms. The treatment provided moderate relief.   Depression  Visit Type: follow-up  Patient is not experiencing: anhedonia, choking sensation, confusion, decreased concentration, depressed mood, feelings of hopelessness, feelings of worthlessness, irritability, memory impairment, nervousness/anxiety, palpitations, panic, shortness of breath, suicidal planning, thoughts of death, weight gain and weight loss.    Sleep quality: good  Compliance with medications:  %        Past Medical History:   Diagnosis Date    Allergy     Anemia     Anxiety     Arthritis     COPD (chronic obstructive pulmonary disease)     Depression     Encounter for blood transfusion     Encounter for screening mammogram for breast cancer 2018    Fibromyalgia     currently on disability for fibromyalgia.     H/O fibromyalgia     Heart murmur     Hepatitis C 2017    Hyperlipidemia     Hypertension     IBS (irritable bowel syndrome)     Need for hepatitis C screening test 2018    Osteoarthritis     Osteoporosis     PTSD (post-traumatic stress disorder)      Social History     Socioeconomic History    Marital status:     Number of children: 2   Occupational History    Occupation: Disabled-      Comment: PTSD, [employer]   Tobacco Use    Smoking status: Every Day     Packs/day: 1.50     Years: 40.00     Pack years: 60.00     Types: Cigarettes     Start date: 1971    Smokeless tobacco: Never   Substance and Sexual Activity    Alcohol use: No     Comment: denies use    Drug use: No    Sexual activity: Not Currently   Social History Narrative    Currently on disability for fibromyalgia.         1 step son and 1 son.     Past Surgical History:   Procedure Laterality Date     SECTION      FRACTURE SURGERY      HYSTERECTOMY       partial     LEG SURGERY Left 2-11-16    orif    TONSILLECTOMY       Family History   Problem Relation Age of Onset    Arthritis Mother     Kidney disease Mother     Depression Mother     Diabetes Mother     Early death Mother     Hypertension Mother     Mental illness Mother     Miscarriages / Stillbirths Mother     Stroke Sister     Breast cancer Sister     Stroke Brother     Hyperlipidemia Brother     Stroke Sister     Alcohol abuse Brother     Liver disease Brother     Thrombophlebitis Other     Cancer Sister 45        breast     Arthritis Sister     Heart disease Maternal Grandfather      Current Outpatient Medications   Medication Sig Dispense Refill    methocarbamoL (ROBAXIN) 750 MG Tab Take 750 mg by mouth 2 (two) times daily.      NARCAN 4 mg/actuation Spry       ondansetron (ZOFRAN) 8 MG tablet TAKE 1 TABLET (8 MG) BY MOUTH EVERY 8 (EIGHT) HOURS AS NEEDED FOR NAUSEA. 20 tablet 0    oxyCODONE-acetaminophen (PERCOCET)  mg per tablet Take 1 tablet by mouth every 8 (eight) hours as needed.      pregabalin (LYRICA) 100 MG capsule Take 100-300 mg by mouth every evening.      salmeteroL (SEREVENT DISKUS) 50 mcg/dose diskus inhaler Inhale 1 puff into the lungs 2 (two) times daily. Controller 60 each 4    albuterol (PROVENTIL HFA) 90 mcg/actuation inhaler Inhale 2 puffs into the lungs every 6 (six) hours as needed for Wheezing. Rescue 18 g 3    alendronate (FOSAMAX) 70 MG tablet Take 1 tablet (70 mg total) by mouth every 7 days. 12 tablet 3    buPROPion (WELLBUTRIN) 75 MG tablet Take 1 tablet (75 mg total) by mouth 2 (two) times daily. 60 tablet 11    esomeprazole (NEXIUM) 20 MG capsule Take 1 capsule (20 mg total) by mouth before breakfast. 90 capsule 1    ezetimibe (ZETIA) 10 mg tablet Take 1 tablet (10 mg total) by mouth once daily. 90 tablet 1    fluticasone propionate (FLONASE) 50 mcg/actuation nasal spray 2 sprays (100 mcg total) by Each Nostril route once daily. 16 g 5    lisinopriL  (PRINIVIL,ZESTRIL) 20 MG tablet Take 1 tablet (20 mg total) by mouth once daily. 90 tablet 1    meclizine (ANTIVERT) 25 mg tablet Take 1 tablet (25 mg total) by mouth 3 (three) times daily as needed. 90 tablet 1    triamterene-hydrochlorothiazide 37.5-25 mg (MAXZIDE-25) 37.5-25 mg per tablet Take 1 tablet by mouth once daily. 90 tablet 1     Current Facility-Administered Medications   Medication Dose Route Frequency Provider Last Rate Last Admin    EPINEPHrine (EPIPEN) 0.3 mg/0.3 mL pen injection 0.3 mg  0.3 mg Intramuscular PRN BRENNA Goodrich         Review of patient's allergies indicates:   Allergen Reactions    Atorvastatin     Red dye Diarrhea and Rash    Spiriva respimat [tiotropium bromide] Anaphylaxis    Augmentin [amoxicillin-pot clavulanate]     Crestor [rosuvastatin] Other (See Comments)     Chest Pain    Lodine [etodolac] Other (See Comments)     Dizziness     Xarelto [rivaroxaban]      Blister on tongue and soreness all over mouth        Review of Systems   Constitutional:  Negative for activity change, appetite change, fatigue, irritability, malaise/fatigue, unexpected weight change, weight gain and weight loss.   HENT:  Negative for congestion, ear pain, hearing loss, hoarse voice, postnasal drip, sinus pressure, sinus pain, sneezing and sore throat.    Eyes:  Negative for blurred vision, photophobia and pain.   Respiratory:  Negative for cough, choking, chest tightness, shortness of breath and wheezing.    Cardiovascular:  Negative for chest pain, palpitations, orthopnea, leg swelling and PND.   Gastrointestinal:  Positive for heartburn. Negative for abdominal distention, abdominal pain, blood in stool, constipation, diarrhea, dysphagia, nausea and vomiting.   Endocrine: Negative for cold intolerance, heat intolerance, polydipsia and polyuria.   Genitourinary:  Negative for difficulty urinating, dysuria, flank pain, frequency, hematuria, pelvic pain and urgency.   Musculoskeletal:  Negative  "for arthralgias, back pain, joint swelling and neck pain.   Skin:  Negative for pallor.   Allergic/Immunologic: Negative for environmental allergies and food allergies.   Neurological:  Negative for dizziness, weakness, light-headedness, numbness and headaches.   Hematological:  Does not bruise/bleed easily.   Psychiatric/Behavioral:  Positive for depression. Negative for agitation, confusion, decreased concentration and sleep disturbance. The patient is not nervous/anxious.         Blood pressure 122/64, pulse 103, temperature 98.1 °F (36.7 °C), temperature source Oral, height 5' 6" (1.676 m), weight 67.4 kg (148 lb 9.6 oz), SpO2 97 %. Body mass index is 23.98 kg/m².   Objective:      Physical Exam  Vitals reviewed.   Constitutional:       General: She is not in acute distress.     Appearance: Normal appearance. She is well-developed. She is not ill-appearing or toxic-appearing.   HENT:      Head: Normocephalic and atraumatic.      Right Ear: External ear normal.      Left Ear: External ear normal.   Eyes:      General:         Right eye: No discharge.         Left eye: No discharge.      Conjunctiva/sclera: Conjunctivae normal.      Pupils: Pupils are equal, round, and reactive to light.   Cardiovascular:      Rate and Rhythm: Normal rate and regular rhythm.      Heart sounds: Normal heart sounds. No murmur heard.  Pulmonary:      Effort: Pulmonary effort is normal. No respiratory distress.      Breath sounds: Normal breath sounds.   Skin:     General: Skin is warm and dry.   Neurological:      Mental Status: She is alert.           Assessment:       1. Colon cancer screening    2. Other hyperlipidemia    3. Essential hypertension    4. Gastroesophageal reflux disease without esophagitis    5. Chronic bronchitis, unspecified chronic bronchitis type    6. Non-seasonal allergic rhinitis due to pollen    7. Osteoporosis without current pathological fracture, unspecified osteoporosis type    8. Vertigo    9. Tongue " mass    10. Encounter for screening mammogram for malignant neoplasm of breast    11. Smoker    12. Personal history of nicotine dependence           Plan:           Colon cancer screening  -     Ambulatory referral/consult to Gastroenterology; Future; Expected date: 09/08/2022    Other hyperlipidemia  -     ezetimibe (ZETIA) 10 mg tablet; Take 1 tablet (10 mg total) by mouth once daily.  Dispense: 90 tablet; Refill: 1  -     Lipid Panel; Future; Expected date: 09/01/2022    Essential hypertension  -     lisinopriL (PRINIVIL,ZESTRIL) 20 MG tablet; Take 1 tablet (20 mg total) by mouth once daily.  Dispense: 90 tablet; Refill: 1  -     triamterene-hydrochlorothiazide 37.5-25 mg (MAXZIDE-25) 37.5-25 mg per tablet; Take 1 tablet by mouth once daily.  Dispense: 90 tablet; Refill: 1  -     Comprehensive Metabolic Panel; Future; Expected date: 09/01/2022  -     CBC auto differential; Future; Expected date: 09/01/2022    Gastroesophageal reflux disease without esophagitis  -     esomeprazole (NEXIUM) 20 MG capsule; Take 1 capsule (20 mg total) by mouth before breakfast.  Dispense: 90 capsule; Refill: 1    Chronic bronchitis, unspecified chronic bronchitis type  -     albuterol (PROVENTIL HFA) 90 mcg/actuation inhaler; Inhale 2 puffs into the lungs every 6 (six) hours as needed for Wheezing. Rescue  Dispense: 18 g; Refill: 3    Non-seasonal allergic rhinitis due to pollen  -     fluticasone propionate (FLONASE) 50 mcg/actuation nasal spray; 2 sprays (100 mcg total) by Each Nostril route once daily.  Dispense: 16 g; Refill: 5    Osteoporosis without current pathological fracture, unspecified osteoporosis type  -     alendronate (FOSAMAX) 70 MG tablet; Take 1 tablet (70 mg total) by mouth every 7 days.  Dispense: 12 tablet; Refill: 3    Vertigo  -     meclizine (ANTIVERT) 25 mg tablet; Take 1 tablet (25 mg total) by mouth 3 (three) times daily as needed.  Dispense: 90 tablet; Refill: 1    Tongue mass  -     Ambulatory  referral/consult to ENT; Future; Expected date: 09/08/2022    Encounter for screening mammogram for malignant neoplasm of breast  -     Mammo Digital Screening Bilat w/ Rick; Future; Expected date: 09/01/2022    Smoker  -     CT Chest Lung Screening Low Dose; Future; Expected date: 09/01/2022    Personal history of nicotine dependence  -     CT Chest Lung Screening Low Dose; Future; Expected date: 09/01/2022    Other orders  -     buPROPion (WELLBUTRIN) 75 MG tablet; Take 1 tablet (75 mg total) by mouth 2 (two) times daily.  Dispense: 60 tablet; Refill: 11

## 2022-10-13 ENCOUNTER — TELEPHONE (OUTPATIENT)
Dept: FAMILY MEDICINE | Facility: CLINIC | Age: 67
End: 2022-10-13

## 2022-10-13 NOTE — TELEPHONE ENCOUNTER
The following medication needs a prior authorization:     Medication Name: esomeprazole (NEXIUM)     Dosage: 20 MG     Frequency: 1 before breakfast    Directions for use: Take 1 capsule (20 mg total) by mouth before breakfast    Diagnosis: Gastroesophageal reflux disease without esophagitis [K21.9]    Is the request for a reauthorization? Yes    Is the patient currently stable on therapy? Yes    Please list all therapeutic alternatives previously used with start/end dates and outcome:    esomeprazole (NEXIUM PACKET) 40 mg     pantoprazole (PROTONIX) 40 MG

## 2022-10-26 RX ORDER — ONDANSETRON HYDROCHLORIDE 8 MG/1
TABLET, FILM COATED ORAL
Qty: 20 TABLET | Refills: 0 | Status: SHIPPED | OUTPATIENT
Start: 2022-10-26 | End: 2022-10-28 | Stop reason: SDUPTHER

## 2022-10-28 RX ORDER — ONDANSETRON HYDROCHLORIDE 8 MG/1
TABLET, FILM COATED ORAL
Qty: 20 TABLET | Refills: 0 | Status: SHIPPED | OUTPATIENT
Start: 2022-10-28 | End: 2022-10-28 | Stop reason: SDUPTHER

## 2022-10-28 RX ORDER — ONDANSETRON HYDROCHLORIDE 8 MG/1
TABLET, FILM COATED ORAL
Qty: 20 TABLET | Refills: 0 | Status: SHIPPED | OUTPATIENT
Start: 2022-10-28 | End: 2023-04-19 | Stop reason: SDUPTHER

## 2022-10-28 RX ORDER — ONDANSETRON HYDROCHLORIDE 8 MG/1
TABLET, FILM COATED ORAL
Qty: 20 TABLET | Refills: 0 | Status: CANCELLED | OUTPATIENT
Start: 2022-10-28

## 2023-02-15 DIAGNOSIS — I10 ESSENTIAL HYPERTENSION: ICD-10-CM

## 2023-02-15 RX ORDER — TRIAMTERENE/HYDROCHLOROTHIAZID 37.5-25 MG
1 TABLET ORAL DAILY
Qty: 90 TABLET | Refills: 1 | Status: SHIPPED | OUTPATIENT
Start: 2023-02-15 | End: 2023-08-10

## 2023-02-22 DIAGNOSIS — J30.1 NON-SEASONAL ALLERGIC RHINITIS DUE TO POLLEN: ICD-10-CM

## 2023-02-22 DIAGNOSIS — J42 CHRONIC BRONCHITIS, UNSPECIFIED CHRONIC BRONCHITIS TYPE: ICD-10-CM

## 2023-02-23 ENCOUNTER — TELEPHONE (OUTPATIENT)
Dept: FAMILY MEDICINE | Facility: CLINIC | Age: 68
End: 2023-02-23

## 2023-02-23 RX ORDER — FLUTICASONE PROPIONATE 50 MCG
2 SPRAY, SUSPENSION (ML) NASAL DAILY
Qty: 16 G | Refills: 5 | Status: SHIPPED | OUTPATIENT
Start: 2023-02-23 | End: 2023-03-06

## 2023-02-23 RX ORDER — SALMETEROL XINAFOATE 50 UG/1
1 POWDER, METERED ORAL; RESPIRATORY (INHALATION) 2 TIMES DAILY
Qty: 60 EACH | Refills: 4 | Status: SHIPPED | OUTPATIENT
Start: 2023-02-23 | End: 2023-03-06

## 2023-03-22 ENCOUNTER — TELEPHONE (OUTPATIENT)
Dept: FAMILY MEDICINE | Facility: CLINIC | Age: 68
End: 2023-03-22

## 2023-03-22 NOTE — TELEPHONE ENCOUNTER
Spoke to patient about getting labs done before appointment next week. Patient stated she was going tomorrow to get them done.

## 2023-03-23 ENCOUNTER — HOSPITAL ENCOUNTER (OUTPATIENT)
Dept: RADIOLOGY | Facility: HOSPITAL | Age: 68
Discharge: HOME | End: 2023-03-23
Attending: FAMILY MEDICINE
Payer: MEDICARE

## 2023-03-23 DIAGNOSIS — Z87.891 PERSONAL HISTORY OF NICOTINE DEPENDENCE: ICD-10-CM

## 2023-03-23 DIAGNOSIS — F17.200 SMOKER: ICD-10-CM

## 2023-03-23 PROCEDURE — 71271 CT THORAX LUNG CANCER SCR C-: CPT | Mod: TC,PO

## 2023-03-28 ENCOUNTER — OFFICE VISIT (OUTPATIENT)
Dept: FAMILY MEDICINE | Facility: CLINIC | Age: 68
End: 2023-03-28
Payer: MEDICARE

## 2023-03-28 VITALS
OXYGEN SATURATION: 98 % | SYSTOLIC BLOOD PRESSURE: 157 MMHG | HEIGHT: 66 IN | HEART RATE: 68 BPM | DIASTOLIC BLOOD PRESSURE: 63 MMHG | WEIGHT: 157.81 LBS | BODY MASS INDEX: 25.36 KG/M2

## 2023-03-28 DIAGNOSIS — J44.9 CHRONIC OBSTRUCTIVE PULMONARY DISEASE, UNSPECIFIED COPD TYPE: ICD-10-CM

## 2023-03-28 DIAGNOSIS — Z12.11 COLON CANCER SCREENING: ICD-10-CM

## 2023-03-28 DIAGNOSIS — J42 CHRONIC BRONCHITIS, UNSPECIFIED CHRONIC BRONCHITIS TYPE: ICD-10-CM

## 2023-03-28 DIAGNOSIS — Z01.00 EYE EXAM, ROUTINE: ICD-10-CM

## 2023-03-28 DIAGNOSIS — I10 ESSENTIAL HYPERTENSION: Primary | ICD-10-CM

## 2023-03-28 DIAGNOSIS — E78.2 MIXED DYSLIPIDEMIA: ICD-10-CM

## 2023-03-28 DIAGNOSIS — M81.0 OSTEOPOROSIS WITHOUT CURRENT PATHOLOGICAL FRACTURE, UNSPECIFIED OSTEOPOROSIS TYPE: ICD-10-CM

## 2023-03-28 PROCEDURE — 1101F PT FALLS ASSESS-DOCD LE1/YR: CPT | Mod: CPTII,S$GLB,, | Performed by: FAMILY MEDICINE

## 2023-03-28 PROCEDURE — 4010F PR ACE/ARB THEARPY RXD/TAKEN: ICD-10-PCS | Mod: CPTII,S$GLB,, | Performed by: FAMILY MEDICINE

## 2023-03-28 PROCEDURE — 3288F PR FALLS RISK ASSESSMENT DOCUMENTED: ICD-10-PCS | Mod: CPTII,S$GLB,, | Performed by: FAMILY MEDICINE

## 2023-03-28 PROCEDURE — 3078F PR MOST RECENT DIASTOLIC BLOOD PRESSURE < 80 MM HG: ICD-10-PCS | Mod: CPTII,S$GLB,, | Performed by: FAMILY MEDICINE

## 2023-03-28 PROCEDURE — 1125F PR PAIN SEVERITY QUANTIFIED, PAIN PRESENT: ICD-10-PCS | Mod: CPTII,S$GLB,, | Performed by: FAMILY MEDICINE

## 2023-03-28 PROCEDURE — 3077F PR MOST RECENT SYSTOLIC BLOOD PRESSURE >= 140 MM HG: ICD-10-PCS | Mod: CPTII,S$GLB,, | Performed by: FAMILY MEDICINE

## 2023-03-28 PROCEDURE — 1101F PR PT FALLS ASSESS DOC 0-1 FALLS W/OUT INJ PAST YR: ICD-10-PCS | Mod: CPTII,S$GLB,, | Performed by: FAMILY MEDICINE

## 2023-03-28 PROCEDURE — 1159F PR MEDICATION LIST DOCUMENTED IN MEDICAL RECORD: ICD-10-PCS | Mod: CPTII,S$GLB,, | Performed by: FAMILY MEDICINE

## 2023-03-28 PROCEDURE — 3008F BODY MASS INDEX DOCD: CPT | Mod: CPTII,S$GLB,, | Performed by: FAMILY MEDICINE

## 2023-03-28 PROCEDURE — 1125F AMNT PAIN NOTED PAIN PRSNT: CPT | Mod: CPTII,S$GLB,, | Performed by: FAMILY MEDICINE

## 2023-03-28 PROCEDURE — 1160F PR REVIEW ALL MEDS BY PRESCRIBER/CLIN PHARMACIST DOCUMENTED: ICD-10-PCS | Mod: CPTII,,, | Performed by: FAMILY MEDICINE

## 2023-03-28 PROCEDURE — 1159F MED LIST DOCD IN RCRD: CPT | Mod: CPTII,S$GLB,, | Performed by: FAMILY MEDICINE

## 2023-03-28 PROCEDURE — 3078F DIAST BP <80 MM HG: CPT | Mod: CPTII,S$GLB,, | Performed by: FAMILY MEDICINE

## 2023-03-28 PROCEDURE — 3008F PR BODY MASS INDEX (BMI) DOCUMENTED: ICD-10-PCS | Mod: CPTII,S$GLB,, | Performed by: FAMILY MEDICINE

## 2023-03-28 PROCEDURE — 3288F FALL RISK ASSESSMENT DOCD: CPT | Mod: CPTII,S$GLB,, | Performed by: FAMILY MEDICINE

## 2023-03-28 PROCEDURE — 3077F SYST BP >= 140 MM HG: CPT | Mod: CPTII,S$GLB,, | Performed by: FAMILY MEDICINE

## 2023-03-28 PROCEDURE — 99214 PR OFFICE/OUTPT VISIT, EST, LEVL IV, 30-39 MIN: ICD-10-PCS | Mod: S$GLB,,, | Performed by: FAMILY MEDICINE

## 2023-03-28 PROCEDURE — 99214 OFFICE O/P EST MOD 30 MIN: CPT | Mod: S$GLB,,, | Performed by: FAMILY MEDICINE

## 2023-03-28 PROCEDURE — 1160F RVW MEDS BY RX/DR IN RCRD: CPT | Mod: CPTII,,, | Performed by: FAMILY MEDICINE

## 2023-03-28 PROCEDURE — 4010F ACE/ARB THERAPY RXD/TAKEN: CPT | Mod: CPTII,S$GLB,, | Performed by: FAMILY MEDICINE

## 2023-03-28 RX ORDER — ALENDRONATE SODIUM 70 MG/1
70 TABLET ORAL
Qty: 12 TABLET | Refills: 3 | Status: SHIPPED | OUTPATIENT
Start: 2023-03-28 | End: 2023-12-12

## 2023-03-28 RX ORDER — LISINOPRIL 30 MG/1
30 TABLET ORAL DAILY
Qty: 90 TABLET | Refills: 3 | Status: SHIPPED | OUTPATIENT
Start: 2023-03-28 | End: 2023-11-17

## 2023-03-28 RX ORDER — SALMETEROL XINAFOATE 50 UG/1
POWDER, METERED ORAL; RESPIRATORY (INHALATION)
Qty: 60 EACH | Refills: 4 | Status: SHIPPED | OUTPATIENT
Start: 2023-03-28 | End: 2023-05-10 | Stop reason: SDUPTHER

## 2023-03-28 RX ORDER — HYDROCODONE BITARTRATE AND ACETAMINOPHEN 10; 325 MG/1; MG/1
1 TABLET ORAL EVERY 6 HOURS
COMMUNITY
Start: 2023-03-20 | End: 2023-11-17

## 2023-03-28 RX ORDER — DICLOFENAC SODIUM 10 MG/G
2 GEL TOPICAL 4 TIMES DAILY
COMMUNITY
Start: 2023-03-08

## 2023-03-28 RX ORDER — ESZOPICLONE 3 MG/1
3 TABLET, FILM COATED ORAL NIGHTLY
COMMUNITY
Start: 2023-03-06

## 2023-03-28 NOTE — PROGRESS NOTES
SUBJECTIVE:    Patient ID: Luz Marina Moses is a 68 y.o. female.    Chief Complaint: Hypertension and Lab Reivew  69 yo female here today  to follow-up on her hypertension, COPD, Hyperlipidemia.  her blood pressure is  not well controlled today, she denies any chest pain shortness a breath or dyspnea on exertion. Will need to increase her medications after this visit.    COPD she is recently had low-dose lung CT that identifies emphysematous changes to her lungs with several calcified granulomas BI-RADS 2.     Reviewed her overdue health maintenance patient is due for COVID-19 vaccine, pneumococcal vaccine, colorectal cancer screening, shingles vaccine. Pt declines all vaccines.  We discussed the risks of pneumonia in a 68-year-old patient with COPD patient still does not want to get a pneumonia vaccine.     SPMHX:  CPOD: Salmeterol 50mcg, Albuterol 90mcg,   HTN: Lisinopril 20mg, Triamterene HCTZ 37.5/25mg, blood pressure is not well controlled  Hyperlipidemia: (Statin Myopathy)  On Zetia 10mg ASCVD is elevate pt continues to decline starting on low dose statin.  Chronic Hep C: Completed treatment  Osteoporosis: Fosamax 70mg (Pt not really taking) Last Dexa was   Allergic Rhinitis: Flonase,  Depression: Wellbutrin 75mg  Fibromyalgia: Gabapentin 600mg   Vertigo: Meclizine 25mg,      Specialists:  Gastro: Dr Valerio   Pain Management: Dr Rodas ([place])   ENT:   Rheum:      Smoke: 1/2 ppd  ETOH: none  Exercise: None      Glucose 70 - 110 mg/dL 89      Lipids  Chol: 221  Tri  HDL: 49  LDL: 149    Hemoglobin: 12  HCT: 39.5    Hyperlipidemia  This is a chronic problem. The current episode started more than 1 year ago. The problem is controlled. Recent lipid tests were reviewed and are high. She has no history of diabetes or obesity. Pertinent negatives include no chest pain or shortness of breath. Current antihyperlipidemic treatment includes ezetimibe (pt is statin intolerant). The current treatment  provides mild improvement of lipids. Compliance problems include adherence to exercise and adherence to diet.  Risk factors for coronary artery disease include a sedentary lifestyle, post-menopausal, hypertension and dyslipidemia.     Hypertension  This is a chronic problem. The current episode started more than 1 year ago. The problem is unchanged. The problem is controlled. Pertinent negatives include no anxiety, blurred vision, chest pain, headaches, malaise/fatigue, neck pain, orthopnea, palpitations, peripheral edema, PND, shortness of breath or sweats. There are no associated agents to hypertension. Risk factors for coronary artery disease include dyslipidemia, male gender and sedentary lifestyle. Past treatments include ACE inhibitors and diuretics. The current treatment provides moderate improvement.     Past Medical History:   Diagnosis Date    Allergy     Anemia     Anxiety     Arthritis     COPD (chronic obstructive pulmonary disease)     Depression     Encounter for blood transfusion     Encounter for screening mammogram for breast cancer 5/8/2018    Fibromyalgia     currently on disability for fibromyalgia.     H/O fibromyalgia     Heart murmur     Hepatitis C 01/2017    Hyperlipidemia     Hypertension     IBS (irritable bowel syndrome)     Need for hepatitis C screening test 5/8/2018    Osteoarthritis     Osteoporosis     PTSD (post-traumatic stress disorder)      Social History     Socioeconomic History    Marital status:     Number of children: 2   Occupational History    Occupation: Disabled-      Comment: PTSD, [employer]   Tobacco Use    Smoking status: Every Day     Packs/day: 1.50     Years: 40.00     Pack years: 60.00     Types: Cigarettes     Start date: 1/1/1971    Smokeless tobacco: Never   Substance and Sexual Activity    Alcohol use: No     Comment: denies use    Drug use: No    Sexual activity: Not Currently   Social History Narrative    Currently on disability for fibromyalgia.          1 step son and 1 son.     Past Surgical History:   Procedure Laterality Date     SECTION      FRACTURE SURGERY      HYSTERECTOMY      partial     LEG SURGERY Left 16    orif    TONSILLECTOMY       Family History   Problem Relation Age of Onset    Arthritis Mother     Kidney disease Mother     Depression Mother     Diabetes Mother     Early death Mother     Hypertension Mother     Mental illness Mother     Miscarriages / Stillbirths Mother     Stroke Sister     Breast cancer Sister     Stroke Brother     Hyperlipidemia Brother     Stroke Sister     Alcohol abuse Brother     Liver disease Brother     Thrombophlebitis Other     Cancer Sister 45        breast     Arthritis Sister     Heart disease Maternal Grandfather      Current Outpatient Medications   Medication Sig Dispense Refill    albuterol (PROVENTIL HFA) 90 mcg/actuation inhaler Inhale 2 puffs into the lungs every 6 (six) hours as needed for Wheezing. Rescue 18 g 3    buPROPion (WELLBUTRIN) 75 MG tablet Take 1 tablet (75 mg total) by mouth 2 (two) times daily. 60 tablet 11    diclofenac sodium (VOLTAREN) 1 % Gel Apply 2 g topically 4 (four) times daily.      esomeprazole (NEXIUM) 20 MG capsule TAKE ONE CAPSULE BY MOUTH ONCE DAILY BEFORE BREAKFAST 60 capsule 0    eszopiclone (LUNESTA) 3 mg Tab Take 3 mg by mouth every evening.      ezetimibe (ZETIA) 10 mg tablet Take 1 tablet (10 mg total) by mouth once daily. 90 tablet 1    fluticasone propionate (FLONASE) 50 mcg/actuation nasal spray SPRAY TWO SPRAYS intranasal ONCE DAILY 16 g 4    HYDROcodone-acetaminophen (NORCO)  mg per tablet Take 1 tablet by mouth every 6 (six) hours.      meclizine (ANTIVERT) 25 mg tablet Take 1 tablet (25 mg total) by mouth 3 (three) times daily as needed. 90 tablet 1    methocarbamoL (ROBAXIN) 750 MG Tab Take 750 mg by mouth 2 (two) times daily.      NARCAN 4 mg/actuation Spry       ondansetron (ZOFRAN) 8 MG tablet TAKE 1 TABLET (8 MG) BY MOUTH EVERY 8 (EIGHT)  HOURS AS NEEDED FOR NAUSEA. Strength: 8 mg 20 tablet 0    oxyCODONE-acetaminophen (PERCOCET)  mg per tablet Take 1 tablet by mouth every 8 (eight) hours as needed.      pregabalin (LYRICA) 100 MG capsule Take 100-300 mg by mouth every evening.      triamterene-hydrochlorothiazide 37.5-25 mg (MAXZIDE-25) 37.5-25 mg per tablet Take 1 tablet by mouth once daily. 90 tablet 1    alendronate (FOSAMAX) 70 MG tablet Take 1 tablet (70 mg total) by mouth every 7 days. 12 tablet 3    lisinopriL (PRINIVIL,ZESTRIL) 30 MG tablet Take 1 tablet (30 mg total) by mouth once daily. 90 tablet 3    salmeteroL (SEREVENT DISKUS) 50 mcg/dose diskus inhaler inhale ONE PUFF into THE lungs TWICE DAILY 60 each 4     Current Facility-Administered Medications   Medication Dose Route Frequency Provider Last Rate Last Admin    EPINEPHrine (EPIPEN) 0.3 mg/0.3 mL pen injection 0.3 mg  0.3 mg Intramuscular PRN BRENNA Goodrich         Review of patient's allergies indicates:   Allergen Reactions    Atorvastatin     Red dye Diarrhea and Rash    Spiriva respimat [tiotropium bromide] Anaphylaxis    Augmentin [amoxicillin-pot clavulanate]     Crestor [rosuvastatin] Other (See Comments)     Chest Pain    Lodine [etodolac] Other (See Comments)     Dizziness     Xarelto [rivaroxaban]      Blister on tongue and soreness all over mouth        Review of Systems   Constitutional:  Negative for activity change, appetite change, fatigue and unexpected weight change.   HENT:  Negative for congestion, ear pain, hearing loss, postnasal drip, sinus pressure, sinus pain, sneezing and sore throat.    Eyes:  Negative for photophobia and pain.   Respiratory:  Negative for cough, choking, chest tightness, shortness of breath and wheezing.    Cardiovascular:  Negative for chest pain, palpitations and leg swelling.   Gastrointestinal:  Negative for abdominal distention, abdominal pain, blood in stool, constipation, diarrhea, nausea and vomiting.   Endocrine:  "Negative for cold intolerance, heat intolerance, polydipsia and polyuria.   Genitourinary:  Negative for difficulty urinating, dysuria, flank pain, frequency, hematuria, pelvic pain and urgency.   Musculoskeletal:  Negative for arthralgias, back pain, joint swelling and neck pain.   Skin:  Negative for pallor.   Allergic/Immunologic: Negative for environmental allergies and food allergies.   Neurological:  Negative for dizziness, weakness, light-headedness, numbness and headaches.   Hematological:  Does not bruise/bleed easily.   Psychiatric/Behavioral:  Negative for agitation, confusion, decreased concentration and sleep disturbance. The patient is not nervous/anxious.         Blood pressure (!) 157/63, pulse 68, height 5' 6" (1.676 m), weight 71.6 kg (157 lb 12.8 oz), SpO2 98 %. Body mass index is 25.47 kg/m².   Objective:      Physical Exam  Vitals reviewed.   Constitutional:       General: She is not in acute distress.     Appearance: Normal appearance. She is well-developed. She is not ill-appearing or toxic-appearing.   HENT:      Head: Normocephalic and atraumatic.      Right Ear: External ear normal.      Left Ear: External ear normal.   Eyes:      General:         Right eye: No discharge.         Left eye: No discharge.      Conjunctiva/sclera: Conjunctivae normal.      Pupils: Pupils are equal, round, and reactive to light.   Cardiovascular:      Rate and Rhythm: Normal rate and regular rhythm.      Heart sounds: Normal heart sounds. No murmur heard.  Pulmonary:      Effort: Pulmonary effort is normal. No respiratory distress.      Breath sounds: Normal breath sounds.   Skin:     General: Skin is warm and dry.   Neurological:      Mental Status: She is alert.           Assessment:       1. Essential hypertension    2. Mixed dyslipidemia    3. Chronic bronchitis, unspecified chronic bronchitis type    4. Osteoporosis without current pathological fracture, unspecified osteoporosis type    5. Colon cancer " screening    6. Eye exam, routine    7. Chronic obstructive pulmonary disease, unspecified COPD type         Plan:           Essential hypertension  -     lisinopriL (PRINIVIL,ZESTRIL) 30 MG tablet; Take 1 tablet (30 mg total) by mouth once daily.  Dispense: 90 tablet; Refill: 3  Reduce the amount of salt in your diet; Lose weight; Avoid drinking too much alcohol; Exercise at least 30 minutes per day most days of the week.  Continue current medications and home BP monitoring.  Mixed dyslipidemia  Pt to continue on current dose of Zetia . Limit red meat, butter, fried foods, cheese, and other foods that have a lot of saturated fat. Consume more: lean meats, fish, fruits, vegetables, whole grains, beans, lentils, and nuts.  Weight loss, and 30-45 min of cardiovascular exercise daily.    Chronic bronchitis, unspecified chronic bronchitis type  -     salmeteroL (SEREVENT DISKUS) 50 mcg/dose diskus inhaler; inhale ONE PUFF into THE lungs TWICE DAILY  Dispense: 60 each; Refill: 4  Patient has been on medication for several years never formally diagnosed with COPD or emphysema will place referral for PFT.    Osteoporosis without current pathological fracture, unspecified osteoporosis type  -     alendronate (FOSAMAX) 70 MG tablet; Take 1 tablet (70 mg total) by mouth every 7 days.  Dispense: 12 tablet; Refill: 3    Colon cancer screening  -     Ambulatory referral/consult to Gastroenterology; Future; Expected date: 04/04/2023    Eye exam, routine  -     Ambulatory referral/consult to Optometry; Future; Expected date: 04/04/2023    Chronic obstructive pulmonary disease, unspecified COPD type  -     Ambulatory referral/consult to Pulmonology; Future; Expected date: 04/04/2023

## 2023-03-29 ENCOUNTER — HOSPITAL ENCOUNTER (OUTPATIENT)
Dept: RADIOLOGY | Facility: HOSPITAL | Age: 68
Discharge: HOME | End: 2023-03-29
Attending: FAMILY MEDICINE
Payer: MEDICARE

## 2023-03-29 VITALS — BODY MASS INDEX: 25.47 KG/M2 | HEIGHT: 66 IN

## 2023-03-29 DIAGNOSIS — Z12.31 ENCOUNTER FOR SCREENING MAMMOGRAM FOR MALIGNANT NEOPLASM OF BREAST: ICD-10-CM

## 2023-03-29 PROCEDURE — 77067 SCR MAMMO BI INCL CAD: CPT | Mod: TC,PO

## 2023-04-03 ENCOUNTER — TELEPHONE (OUTPATIENT)
Dept: PULMONOLOGY | Facility: CLINIC | Age: 68
End: 2023-04-03

## 2023-04-11 ENCOUNTER — TELEPHONE (OUTPATIENT)
Dept: FAMILY MEDICINE | Facility: CLINIC | Age: 68
End: 2023-04-11

## 2023-04-11 NOTE — TELEPHONE ENCOUNTER
We received a letter from [facility] stating the patients Serevent Diskus 50 mcg is non formulary. They stat it is either not covered or subject to limits listed in the utilization management. Letter is scanned in media.  No alternative medications given.

## 2023-04-19 DIAGNOSIS — R11.0 NAUSEA: Primary | ICD-10-CM

## 2023-04-19 RX ORDER — ONDANSETRON HYDROCHLORIDE 8 MG/1
TABLET, FILM COATED ORAL
Qty: 20 TABLET | Refills: 0 | Status: SHIPPED | OUTPATIENT
Start: 2023-04-19 | End: 2023-05-25

## 2023-05-02 DIAGNOSIS — J42 CHRONIC BRONCHITIS, UNSPECIFIED CHRONIC BRONCHITIS TYPE: ICD-10-CM

## 2023-05-03 RX ORDER — ALBUTEROL SULFATE 90 UG/1
2 AEROSOL, METERED RESPIRATORY (INHALATION) EVERY 6 HOURS PRN
Qty: 18 G | Refills: 3 | Status: SHIPPED | OUTPATIENT
Start: 2023-05-03 | End: 2024-05-02

## 2023-05-10 DIAGNOSIS — J42 CHRONIC BRONCHITIS, UNSPECIFIED CHRONIC BRONCHITIS TYPE: ICD-10-CM

## 2023-05-10 RX ORDER — SALMETEROL XINAFOATE 50 UG/1
POWDER, METERED ORAL; RESPIRATORY (INHALATION)
Qty: 60 EACH | Refills: 4 | OUTPATIENT
Start: 2023-05-10 | End: 2023-06-11

## 2023-05-10 NOTE — TELEPHONE ENCOUNTER
Patient called asking if you could order her Serevent Diskus medically necessary otherwise her insurance will not cover it.

## 2023-05-25 DIAGNOSIS — R11.0 NAUSEA: ICD-10-CM

## 2023-05-25 RX ORDER — ONDANSETRON HYDROCHLORIDE 8 MG/1
TABLET, FILM COATED ORAL
Qty: 20 TABLET | Refills: 0 | Status: SHIPPED | OUTPATIENT
Start: 2023-05-25 | End: 2023-08-08

## 2023-06-08 ENCOUNTER — TELEPHONE (OUTPATIENT)
Dept: FAMILY MEDICINE | Facility: CLINIC | Age: 68
End: 2023-06-08

## 2023-06-08 NOTE — TELEPHONE ENCOUNTER
The following medication needs a prior authorization:     Medication Name: salmeteroL (SEREVENT DISKUS) 50 mcg/dose diskus inhaler    Dosage: salmeteroL (SEREVENT DISKUS) 50 mcg/dose diskus inhaler    Frequency: TWICE DAILY    Directions for use: inhale ONE PUFF into THE lungs TWICE DAILY    Diagnosis: Chronic bronchitis, unspecified chronic bronchitis type [J42]    Is the request for a reauthorization?     Is the patient currently stable on therapy?     Please list all therapeutic alternatives previously used with start/end dates and outcome:      Pt has anaphylaxis allergy reaction with Spiriva    albuterol (PROVENTIL HFA) 90 mcg/actuation inhaler  9/1/22-5/2/23 1/6/20-5/11/20      albuterol sulfate nebulizer solution 2.5 mg     albuterol (PROAIR HFA) 90 mcg/actuation inhaler  11/6/2018-5/7/2019    albuterol inhaler 2 puff   2016    [facility]

## 2023-06-09 ENCOUNTER — TELEPHONE (OUTPATIENT)
Dept: FAMILY MEDICINE | Facility: CLINIC | Age: 68
End: 2023-06-09

## 2023-06-09 NOTE — TELEPHONE ENCOUNTER
Insurance had denied the PA for Serevent Diskus. The alternatives covered are  Striverdi Respimat 2.5 mcg/actuation solution for inhalation  Drug quantity/supply 4.00/30  Drug quantity/supply 12.00/90    Symbicort 80 mcg-4.5 mcg/ actuation HFA aerosol inhaler   Drug quantity/supply 10.200/30  Drug quantity/supply  30.600/90  Denial letter is in media

## 2023-06-11 RX ORDER — BUDESONIDE AND FORMOTEROL FUMARATE DIHYDRATE 80; 4.5 UG/1; UG/1
2 AEROSOL RESPIRATORY (INHALATION) 2 TIMES DAILY
Qty: 30.6 G | Refills: 90 | Status: SHIPPED | OUTPATIENT
Start: 2023-06-11 | End: 2024-06-10

## 2023-06-15 ENCOUNTER — TELEPHONE (OUTPATIENT)
Dept: FAMILY MEDICINE | Facility: CLINIC | Age: 68
End: 2023-06-15

## 2023-06-15 NOTE — TELEPHONE ENCOUNTER
Called patient to let her know that Symbicort was sent. Patient stated that she was going to contact [facility] one more time to see if she can get the Serevent approved.

## 2023-06-15 NOTE — TELEPHONE ENCOUNTER
----- Message from John Billings sent at 6/15/2023 10:43 AM CDT -----  Regarding: Serevent DENIED  Insurance denied this patient's Serevent inhaler.  Formulary drug is Striverdi Respimat.  Please see denial letter scanned in .    Thanks  John.

## 2023-07-19 DIAGNOSIS — E78.49 OTHER HYPERLIPIDEMIA: ICD-10-CM

## 2023-07-20 RX ORDER — EZETIMIBE 10 MG/1
TABLET ORAL
Qty: 30 TABLET | Refills: 3 | Status: SHIPPED | OUTPATIENT
Start: 2023-07-20 | End: 2023-10-23

## 2023-08-08 DIAGNOSIS — R11.0 NAUSEA: ICD-10-CM

## 2023-08-08 RX ORDER — ONDANSETRON HYDROCHLORIDE 8 MG/1
TABLET, FILM COATED ORAL
Qty: 20 TABLET | Refills: 0 | Status: SHIPPED | OUTPATIENT
Start: 2023-08-08 | End: 2023-11-17 | Stop reason: SDUPTHER

## 2023-08-10 DIAGNOSIS — I10 ESSENTIAL HYPERTENSION: ICD-10-CM

## 2023-08-10 RX ORDER — TRIAMTERENE/HYDROCHLOROTHIAZID 37.5-25 MG
1 TABLET ORAL DAILY
Qty: 90 TABLET | Refills: 0 | Status: SHIPPED | OUTPATIENT
Start: 2023-08-10 | End: 2023-11-16 | Stop reason: SDUPTHER

## 2023-08-10 NOTE — TELEPHONE ENCOUNTER
Please contact the patient and let her know she has a pending referral to Gastroenterology to get a colonoscopy completed.     Tests to Keep You Healthy    Mammogram: Met on 3/29/2023  Colon Cancer Screening: DUE  Last Blood Pressure <= 139/89 (3/28/2023): NO  Tobacco Cessation: NO

## 2023-08-11 NOTE — TELEPHONE ENCOUNTER
Spoke with patient about referrals. She stated that she was taking care of her sister. She she her sister is now better and she can concentrate on her own health. Patient was given all contact information for her referral to GI, Pulmonology and optometry.  Patient's intent is to have all completed before her October appointment. Patient expressed verbal understanding.

## 2023-09-14 RX ORDER — BUPROPION HYDROCHLORIDE 75 MG/1
75 TABLET ORAL 2 TIMES DAILY
Qty: 60 TABLET | Refills: 7 | Status: SHIPPED | OUTPATIENT
Start: 2023-09-14 | End: 2023-11-17

## 2023-10-23 DIAGNOSIS — E78.49 OTHER HYPERLIPIDEMIA: ICD-10-CM

## 2023-10-23 RX ORDER — EZETIMIBE 10 MG/1
TABLET ORAL
Qty: 30 TABLET | Refills: 3 | Status: SHIPPED | OUTPATIENT
Start: 2023-10-23 | End: 2024-03-04

## 2023-10-26 DIAGNOSIS — Z78.0 MENOPAUSE: ICD-10-CM

## 2023-11-16 DIAGNOSIS — I10 ESSENTIAL HYPERTENSION: ICD-10-CM

## 2023-11-16 RX ORDER — TRIAMTERENE/HYDROCHLOROTHIAZID 37.5-25 MG
1 TABLET ORAL DAILY
Qty: 90 TABLET | Refills: 0 | OUTPATIENT
Start: 2023-11-16

## 2023-11-17 ENCOUNTER — OFFICE VISIT (OUTPATIENT)
Dept: FAMILY MEDICINE | Facility: CLINIC | Age: 68
End: 2023-11-17
Payer: MEDICARE

## 2023-11-17 VITALS
HEIGHT: 66 IN | DIASTOLIC BLOOD PRESSURE: 82 MMHG | OXYGEN SATURATION: 98 % | BODY MASS INDEX: 26.58 KG/M2 | WEIGHT: 165.38 LBS | SYSTOLIC BLOOD PRESSURE: 138 MMHG | HEART RATE: 95 BPM

## 2023-11-17 DIAGNOSIS — Z12.11 COLON CANCER SCREENING: ICD-10-CM

## 2023-11-17 DIAGNOSIS — I10 ESSENTIAL HYPERTENSION: ICD-10-CM

## 2023-11-17 DIAGNOSIS — M19.042 PRIMARY OSTEOARTHRITIS OF LEFT HAND: ICD-10-CM

## 2023-11-17 DIAGNOSIS — R11.0 NAUSEA: ICD-10-CM

## 2023-11-17 DIAGNOSIS — F32.A DEPRESSION, UNSPECIFIED DEPRESSION TYPE: ICD-10-CM

## 2023-11-17 DIAGNOSIS — E78.2 MIXED DYSLIPIDEMIA: Primary | ICD-10-CM

## 2023-11-17 DIAGNOSIS — R42 VERTIGO: ICD-10-CM

## 2023-11-17 DIAGNOSIS — Z13.31 POSITIVE DEPRESSION SCREENING: ICD-10-CM

## 2023-11-17 PROCEDURE — 3079F DIAST BP 80-89 MM HG: CPT | Mod: CPTII,S$GLB,, | Performed by: FAMILY MEDICINE

## 2023-11-17 PROCEDURE — 99214 OFFICE O/P EST MOD 30 MIN: CPT | Mod: S$GLB,,, | Performed by: FAMILY MEDICINE

## 2023-11-17 PROCEDURE — 1125F PR PAIN SEVERITY QUANTIFIED, PAIN PRESENT: ICD-10-PCS | Mod: CPTII,S$GLB,, | Performed by: FAMILY MEDICINE

## 2023-11-17 PROCEDURE — 3075F PR MOST RECENT SYSTOLIC BLOOD PRESS GE 130-139MM HG: ICD-10-PCS | Mod: CPTII,S$GLB,, | Performed by: FAMILY MEDICINE

## 2023-11-17 PROCEDURE — 4010F ACE/ARB THERAPY RXD/TAKEN: CPT | Mod: CPTII,S$GLB,, | Performed by: FAMILY MEDICINE

## 2023-11-17 PROCEDURE — 1125F AMNT PAIN NOTED PAIN PRSNT: CPT | Mod: CPTII,S$GLB,, | Performed by: FAMILY MEDICINE

## 2023-11-17 PROCEDURE — 3288F FALL RISK ASSESSMENT DOCD: CPT | Mod: CPTII,S$GLB,, | Performed by: FAMILY MEDICINE

## 2023-11-17 PROCEDURE — 3008F PR BODY MASS INDEX (BMI) DOCUMENTED: ICD-10-PCS | Mod: CPTII,S$GLB,, | Performed by: FAMILY MEDICINE

## 2023-11-17 PROCEDURE — 99214 PR OFFICE/OUTPT VISIT, EST, LEVL IV, 30-39 MIN: ICD-10-PCS | Mod: S$GLB,,, | Performed by: FAMILY MEDICINE

## 2023-11-17 PROCEDURE — 1159F MED LIST DOCD IN RCRD: CPT | Mod: CPTII,S$GLB,, | Performed by: FAMILY MEDICINE

## 2023-11-17 PROCEDURE — 3079F PR MOST RECENT DIASTOLIC BLOOD PRESSURE 80-89 MM HG: ICD-10-PCS | Mod: CPTII,S$GLB,, | Performed by: FAMILY MEDICINE

## 2023-11-17 PROCEDURE — 3075F SYST BP GE 130 - 139MM HG: CPT | Mod: CPTII,S$GLB,, | Performed by: FAMILY MEDICINE

## 2023-11-17 PROCEDURE — 1101F PT FALLS ASSESS-DOCD LE1/YR: CPT | Mod: CPTII,S$GLB,, | Performed by: FAMILY MEDICINE

## 2023-11-17 PROCEDURE — 1159F PR MEDICATION LIST DOCUMENTED IN MEDICAL RECORD: ICD-10-PCS | Mod: CPTII,S$GLB,, | Performed by: FAMILY MEDICINE

## 2023-11-17 PROCEDURE — 1101F PR PT FALLS ASSESS DOC 0-1 FALLS W/OUT INJ PAST YR: ICD-10-PCS | Mod: CPTII,S$GLB,, | Performed by: FAMILY MEDICINE

## 2023-11-17 PROCEDURE — 3008F BODY MASS INDEX DOCD: CPT | Mod: CPTII,S$GLB,, | Performed by: FAMILY MEDICINE

## 2023-11-17 PROCEDURE — 4010F PR ACE/ARB THEARPY RXD/TAKEN: ICD-10-PCS | Mod: CPTII,S$GLB,, | Performed by: FAMILY MEDICINE

## 2023-11-17 PROCEDURE — 3288F PR FALLS RISK ASSESSMENT DOCUMENTED: ICD-10-PCS | Mod: CPTII,S$GLB,, | Performed by: FAMILY MEDICINE

## 2023-11-17 RX ORDER — LISINOPRIL 40 MG/1
40 TABLET ORAL DAILY
Qty: 90 TABLET | Refills: 3 | Status: SHIPPED | OUTPATIENT
Start: 2023-11-17 | End: 2024-11-16

## 2023-11-17 RX ORDER — MECLIZINE HYDROCHLORIDE 25 MG/1
25 TABLET ORAL 3 TIMES DAILY PRN
Qty: 90 TABLET | Refills: 1 | Status: SHIPPED | OUTPATIENT
Start: 2023-11-17

## 2023-11-17 RX ORDER — ONDANSETRON HYDROCHLORIDE 8 MG/1
8 TABLET, FILM COATED ORAL EVERY 12 HOURS PRN
Qty: 20 TABLET | Refills: 0 | Status: SHIPPED | OUTPATIENT
Start: 2023-11-17 | End: 2024-01-03

## 2023-11-17 RX ORDER — BUPROPION HYDROCHLORIDE 300 MG/1
300 TABLET ORAL DAILY
Qty: 30 TABLET | Refills: 11 | Status: SHIPPED | OUTPATIENT
Start: 2023-11-17 | End: 2024-11-16

## 2023-11-17 RX ORDER — BUPRENORPHINE HYDROCHLORIDE 300 UG/1
1 FILM, SOLUBLE BUCCAL 2 TIMES DAILY
COMMUNITY
Start: 2023-11-01

## 2023-11-17 RX ORDER — TRIAMTERENE/HYDROCHLOROTHIAZID 37.5-25 MG
1 TABLET ORAL DAILY
Qty: 90 TABLET | Refills: 1 | Status: SHIPPED | OUTPATIENT
Start: 2023-11-17

## 2023-11-17 NOTE — PROGRESS NOTES
SUBJECTIVE:    Patient ID: Luz Marina Moses is a 68 y.o. female.    Chief Complaint: Hypertension and Dizziness  67 yo female here today  to follow-up on her hypertension, COPD, Hyperlipidemia.  her blood pressure is  not well controlled today, she denies any chest pain shortness a breath or dyspnea on exertion. Will need to increase her medications after this visit.         Reviewed her overdue health maintenance patient is due for COVID-19 vaccine, pneumococcal vaccine, colorectal cancer screening, shingles vaccine. Pt declines all vaccines.  We discussed the risks of pneumonia in a 68-year-old patient with COPD patient still does not want to get a pneumonia vaccine.     SPMHX:  CPOD: Salmeterol 50mcg, Albuterol 90mcg,   HTN: Lisinopril 20mg, Triamterene HCTZ 37.5/25mg, blood pressure is not well controlled  Hyperlipidemia: (Statin Myopathy)  On Zetia 10mg ASCVD is elevate pt continues to decline starting on low dose statin.  Chronic Hep C: Completed treatment  Osteoporosis: Fosamax 70mg (Pt not really taking) Last Dexa was 2020  Allergic Rhinitis: Flonase,  Depression: Wellbutrin 75mg  Fibromyalgia: Gabapentin 600mg   Vertigo: Meclizine 25mg,      Specialists:  Gastro: Dr Valerio   Pain Management: Dr Rodas ([facility])   ENT:   Rheum:      Smoke: 1/2 ppd  ETOH: none  Exercise: None    Depression  Visit Type: follow-up  Patient presents with the following symptoms: anhedonia, decreased concentration, depressed mood and insomnia.  Patient is not experiencing: choking sensation, confusion, nervousness/anxiety, palpitations, shortness of breath, suicidal ideas, suicidal planning, thoughts of death, weight gain and weight loss.  Frequency of symptoms: most days   Severity: moderate   Sleep quality: fair        Hyperlipidemia  This is a chronic problem. The current episode started more than 1 year ago. The problem is controlled. Recent lipid tests were reviewed and are high. She has no history of diabetes or obesity.  Pertinent negatives include no chest pain or shortness of breath. Current antihyperlipidemic treatment includes ezetimibe (pt is statin intolerant). The current treatment provides mild improvement of lipids. Compliance problems include adherence to exercise and adherence to diet.  Risk factors for coronary artery disease include a sedentary lifestyle, post-menopausal, hypertension and dyslipidemia.      Hypertension  This is a chronic problem. The current episode started more than 1 year ago. The problem is unchanged. The problem is controlled. Pertinent negatives include no anxiety, blurred vision, chest pain, headaches, malaise/fatigue, neck pain, orthopnea, palpitations, peripheral edema, PND, shortness of breath or sweats. There are no associated agents to hypertension. Risk factors for coronary artery disease include dyslipidemia, male gender and sedentary lifestyle. Past treatments include ACE inhibitors and diuretics. The current treatment provides moderate improvement.   Past Medical History:   Diagnosis Date    Allergy     Anemia     Anxiety     Arthritis     COPD (chronic obstructive pulmonary disease)     Depression     Encounter for blood transfusion     Encounter for screening mammogram for breast cancer 5/8/2018    Fibromyalgia     currently on disability for fibromyalgia.     H/O fibromyalgia     Heart murmur     Hepatitis C 01/2017    Hyperlipidemia     Hypertension     IBS (irritable bowel syndrome)     Need for hepatitis C screening test 5/8/2018    Osteoarthritis     Osteoporosis     PTSD (post-traumatic stress disorder)      Social History     Socioeconomic History    Marital status:     Number of children: 2   Occupational History    Occupation: Disabled-      Comment: PTSD, [employer]   Tobacco Use    Smoking status: Every Day     Current packs/day: 1.50     Average packs/day: 1.5 packs/day for 52.9 years (79.3 ttl pk-yrs)     Types: Cigarettes     Start date: 1/1/1971    Smokeless  tobacco: Never   Substance and Sexual Activity    Alcohol use: No     Comment: denies use    Drug use: No    Sexual activity: Not Currently   Social History Narrative    Currently on disability for fibromyalgia.         1 step son and 1 son.     Social Determinants of Health     Stress: No Stress Concern Present (2020)    [employer] - Occupational Stress Questionnaire     Feeling of Stress : Only a little     Past Surgical History:   Procedure Laterality Date     SECTION      FRACTURE SURGERY      HYSTERECTOMY      partial     LEG SURGERY Left 16    orif    TONSILLECTOMY       Family History   Problem Relation Age of Onset    Arthritis Mother     Kidney disease Mother     Depression Mother     Diabetes Mother     Early death Mother     Hypertension Mother     Mental illness Mother     Miscarriages / Stillbirths Mother     Stroke Sister     Breast cancer Sister     Stroke Brother     Hyperlipidemia Brother     Stroke Sister     Alcohol abuse Brother     Liver disease Brother     Thrombophlebitis Other     Cancer Sister 45        breast     Arthritis Sister     Heart disease Maternal Grandfather      Current Outpatient Medications   Medication Sig Dispense Refill    albuterol (PROVENTIL HFA) 90 mcg/actuation inhaler Inhale 2 puffs into the lungs every 6 (six) hours as needed for Wheezing. Rescue 18 g 3    alendronate (FOSAMAX) 70 MG tablet Take 1 tablet (70 mg total) by mouth every 7 days. 12 tablet 3    BELBUCA 300 mcg Film Take 1 strip by mouth 2 (two) times daily.      budesonide-formoterol 80-4.5 mcg (SYMBICORT) 80-4.5 mcg/actuation HFAA Inhale 2 puffs into the lungs 2 (two) times a day. Controller 30.6 g 90    diclofenac sodium (VOLTAREN) 1 % Gel Apply 2 g topically 4 (four) times daily.      esomeprazole (NEXIUM) 20 MG capsule TAKE ONE CAPSULE BY MOUTH ONCE DAILY BEFORE BREAKFAST 60 capsule 0    eszopiclone (LUNESTA) 3 mg Tab Take 3 mg by mouth every evening.       ezetimibe (ZETIA) 10 mg tablet TAKE 1 TABLET BY MOUTH ONCE DAILY 30 tablet 3    fluticasone propionate (FLONASE) 50 mcg/actuation nasal spray SPRAY TWO SPRAYS intranasal ONCE DAILY 16 g 4    methocarbamoL (ROBAXIN) 750 MG Tab Take 750 mg by mouth 2 (two) times daily.      NARCAN 4 mg/actuation Spry       oxyCODONE-acetaminophen (PERCOCET)  mg per tablet Take 1 tablet by mouth every 8 (eight) hours as needed.      pregabalin (LYRICA) 100 MG capsule Take 100-300 mg by mouth every evening.      buPROPion (WELLBUTRIN XL) 300 MG 24 hr tablet Take 1 tablet (300 mg total) by mouth once daily. 30 tablet 11    lisinopriL (PRINIVIL,ZESTRIL) 40 MG tablet Take 1 tablet (40 mg total) by mouth once daily. 90 tablet 3    meclizine (ANTIVERT) 25 mg tablet Take 1 tablet (25 mg total) by mouth 3 (three) times daily as needed. 90 tablet 1    ondansetron (ZOFRAN) 8 MG tablet Take 1 tablet (8 mg total) by mouth every 12 (twelve) hours as needed for Nausea. 20 tablet 0    triamterene-hydrochlorothiazide 37.5-25 mg (MAXZIDE-25) 37.5-25 mg per tablet Take 1 tablet by mouth once daily. 90 tablet 1     Current Facility-Administered Medications   Medication Dose Route Frequency Provider Last Rate Last Admin    EPINEPHrine (EPIPEN) 0.3 mg/0.3 mL pen injection 0.3 mg  0.3 mg Intramuscular PRN Gianna Gomez, FNP         Review of patient's allergies indicates:   Allergen Reactions    Atorvastatin     Red dye Diarrhea and Rash    Spiriva respimat [tiotropium bromide] Anaphylaxis    Augmentin [amoxicillin-pot clavulanate]     Crestor [rosuvastatin] Other (See Comments)     Chest Pain    Lodine [etodolac] Other (See Comments)     Dizziness     Xarelto [rivaroxaban]      Blister on tongue and soreness all over mouth        Review of Systems   Constitutional:  Negative for activity change, appetite change, fatigue, unexpected weight change, weight gain and weight loss.   HENT:  Negative for congestion, ear pain, hearing loss, postnasal  "drip, sinus pressure, sinus pain, sneezing and sore throat.    Eyes:  Negative for photophobia and pain.   Respiratory:  Negative for cough, choking, chest tightness, shortness of breath and wheezing.    Cardiovascular:  Negative for chest pain, palpitations and leg swelling.   Gastrointestinal:  Negative for abdominal distention, abdominal pain, blood in stool, constipation, diarrhea, nausea and vomiting.   Endocrine: Negative for cold intolerance, heat intolerance, polydipsia and polyuria.   Genitourinary:  Negative for difficulty urinating, dysuria, flank pain, frequency, hematuria, pelvic pain and urgency.   Musculoskeletal:  Negative for arthralgias, back pain, joint swelling and neck pain.   Skin:  Negative for pallor.   Allergic/Immunologic: Negative for environmental allergies and food allergies.   Neurological:  Negative for dizziness, weakness, light-headedness, numbness and headaches.   Hematological:  Does not bruise/bleed easily.   Psychiatric/Behavioral:  Positive for decreased concentration and depression. Negative for agitation, confusion, sleep disturbance and suicidal ideas. The patient has insomnia. The patient is not nervous/anxious.           Blood pressure 138/82, pulse 95, height 5' 6" (1.676 m), weight 75 kg (165 lb 6.4 oz), SpO2 98 %. Body mass index is 26.7 kg/m².   Objective:      Physical Exam  Vitals reviewed.   Constitutional:       General: She is not in acute distress.     Appearance: Normal appearance. She is well-developed. She is not ill-appearing or toxic-appearing.   HENT:      Head: Normocephalic and atraumatic.      Right Ear: External ear normal.      Left Ear: External ear normal.   Eyes:      General:         Right eye: No discharge.         Left eye: No discharge.      Conjunctiva/sclera: Conjunctivae normal.      Pupils: Pupils are equal, round, and reactive to light.   Cardiovascular:      Rate and Rhythm: Normal rate and regular rhythm.      Heart sounds: Normal heart " sounds. No murmur heard.  Pulmonary:      Effort: Pulmonary effort is normal. No respiratory distress.      Breath sounds: Normal breath sounds.   Musculoskeletal:        Hands:       Comments: Pt with pain and swelling in her left thumb MCP joint.    Skin:     General: Skin is warm and dry.   Neurological:      Mental Status: She is alert.             Assessment:       1. Mixed dyslipidemia    2. Essential hypertension    3. Vertigo    4. Nausea    5. Colon cancer screening    6. Primary osteoarthritis of left hand    7. Positive depression screening    8. Depression, unspecified depression type         Plan:           Mixed dyslipidemia  -     Lipid Panel; Future; Expected date: 11/17/2023    Essential hypertension  -     triamterene-hydrochlorothiazide 37.5-25 mg (MAXZIDE-25) 37.5-25 mg per tablet; Take 1 tablet by mouth once daily.  Dispense: 90 tablet; Refill: 1  -     Comprehensive Metabolic Panel; Future; Expected date: 11/17/2023  -     lisinopriL (PRINIVIL,ZESTRIL) 40 MG tablet; Take 1 tablet (40 mg total) by mouth once daily.  Dispense: 90 tablet; Refill: 3    Vertigo  -     meclizine (ANTIVERT) 25 mg tablet; Take 1 tablet (25 mg total) by mouth 3 (three) times daily as needed.  Dispense: 90 tablet; Refill: 1    Nausea  -     ondansetron (ZOFRAN) 8 MG tablet; Take 1 tablet (8 mg total) by mouth every 12 (twelve) hours as needed for Nausea.  Dispense: 20 tablet; Refill: 0    Colon cancer screening  -     Ambulatory referral/consult to Gastroenterology; Future; Expected date: 11/24/2023    Primary osteoarthritis of left hand  -     Ambulatory referral/consult to Orthopedics; Future; Expected date: 11/24/2023    Positive depression screening  Comments:  I have reviewed the positive depression score which warrants active treatment with psychotherapy and/or medications.    Depression, unspecified depression type  -     buPROPion (WELLBUTRIN XL) 300 MG 24 hr tablet; Take 1 tablet (300 mg total) by mouth once  daily.  Dispense: 30 tablet; Refill: 11                          I have reviewed the positive depression score which warrants active treatment with psychotherapy and/or medications. Will increase her dose of well butrin

## 2023-12-11 DIAGNOSIS — M81.0 OSTEOPOROSIS WITHOUT CURRENT PATHOLOGICAL FRACTURE, UNSPECIFIED OSTEOPOROSIS TYPE: ICD-10-CM

## 2023-12-12 RX ORDER — ALENDRONATE SODIUM 70 MG/1
70 TABLET ORAL
Qty: 12 TABLET | Refills: 3 | Status: SHIPPED | OUTPATIENT
Start: 2023-12-12 | End: 2024-12-11

## 2024-01-02 DIAGNOSIS — R11.0 NAUSEA: ICD-10-CM

## 2024-01-03 RX ORDER — ONDANSETRON HYDROCHLORIDE 8 MG/1
TABLET, FILM COATED ORAL
Qty: 20 TABLET | Refills: 0 | Status: SHIPPED | OUTPATIENT
Start: 2024-01-03

## 2024-02-27 DIAGNOSIS — J30.1 NON-SEASONAL ALLERGIC RHINITIS DUE TO POLLEN: ICD-10-CM

## 2024-02-27 DIAGNOSIS — Z00.00 ENCOUNTER FOR MEDICARE ANNUAL WELLNESS EXAM: ICD-10-CM

## 2024-02-27 DIAGNOSIS — I10 ESSENTIAL HYPERTENSION: ICD-10-CM

## 2024-02-27 RX ORDER — TRIAMTERENE/HYDROCHLOROTHIAZID 37.5-25 MG
1 TABLET ORAL DAILY
Qty: 90 TABLET | Refills: 1 | OUTPATIENT
Start: 2024-02-27

## 2024-02-27 RX ORDER — FLUTICASONE PROPIONATE 50 MCG
SPRAY, SUSPENSION (ML) NASAL
Qty: 16 G | Refills: 3 | Status: SHIPPED | OUTPATIENT
Start: 2024-02-27

## 2024-03-02 DIAGNOSIS — E78.49 OTHER HYPERLIPIDEMIA: ICD-10-CM

## 2024-03-04 RX ORDER — EZETIMIBE 10 MG/1
TABLET ORAL
Qty: 30 TABLET | Refills: 3 | Status: SHIPPED | OUTPATIENT
Start: 2024-03-04

## 2024-03-12 ENCOUNTER — PATIENT MESSAGE (OUTPATIENT)
Dept: ADMINISTRATIVE | Facility: HOSPITAL | Age: 69
End: 2024-03-12
Payer: MEDICAID

## 2024-04-12 ENCOUNTER — TELEPHONE (OUTPATIENT)
Dept: FAMILY MEDICINE | Facility: CLINIC | Age: 69
End: 2024-04-12
Payer: MEDICAID

## 2024-05-03 DIAGNOSIS — J42 CHRONIC BRONCHITIS, UNSPECIFIED CHRONIC BRONCHITIS TYPE: ICD-10-CM

## 2024-05-07 RX ORDER — ALBUTEROL SULFATE 90 UG/1
2 AEROSOL, METERED RESPIRATORY (INHALATION) EVERY 6 HOURS PRN
Qty: 18 G | Refills: 3 | Status: SHIPPED | OUTPATIENT
Start: 2024-05-07

## 2024-06-03 ENCOUNTER — TELEPHONE (OUTPATIENT)
Dept: FAMILY MEDICINE | Facility: CLINIC | Age: 69
End: 2024-06-03
Payer: MEDICAID

## 2024-06-03 NOTE — TELEPHONE ENCOUNTER
Spoke to patient about getting labs done before appointment next week. Patient stated she would get them done.

## 2024-06-04 DIAGNOSIS — I10 ESSENTIAL HYPERTENSION: ICD-10-CM

## 2024-06-05 RX ORDER — TRIAMTERENE/HYDROCHLOROTHIAZID 37.5-25 MG
1 TABLET ORAL DAILY
Qty: 90 TABLET | Refills: 1 | Status: SHIPPED | OUTPATIENT
Start: 2024-06-05

## 2024-06-20 PROBLEM — G93.41 ACUTE METABOLIC ENCEPHALOPATHY: Status: ACTIVE | Noted: 2024-06-20

## 2024-06-20 PROBLEM — R74.01 TRANSAMINITIS: Status: ACTIVE | Noted: 2024-06-20

## 2024-06-20 PROBLEM — F41.9 ANXIETY: Status: ACTIVE | Noted: 2024-06-20

## 2024-06-20 PROBLEM — R93.0 ABNORMAL HEAD CT: Status: ACTIVE | Noted: 2024-06-20

## 2024-06-20 PROBLEM — N17.9 AKI (ACUTE KIDNEY INJURY): Status: ACTIVE | Noted: 2024-06-20

## 2024-06-20 PROBLEM — A41.9 SEPSIS DUE TO PNEUMONIA: Status: ACTIVE | Noted: 2024-06-20

## 2024-06-20 PROBLEM — Z71.89 ACP (ADVANCE CARE PLANNING): Status: ACTIVE | Noted: 2024-06-20

## 2024-06-20 PROBLEM — J18.9 SEPSIS DUE TO PNEUMONIA: Status: ACTIVE | Noted: 2024-06-20

## 2024-06-20 PROBLEM — B19.20 HCV (HEPATITIS C VIRUS): Status: ACTIVE | Noted: 2018-05-08

## 2024-06-20 PROBLEM — E78.5 HLD (HYPERLIPIDEMIA): Status: ACTIVE | Noted: 2024-06-20

## 2024-06-20 PROBLEM — J44.9 COPD (CHRONIC OBSTRUCTIVE PULMONARY DISEASE): Status: ACTIVE | Noted: 2018-05-08

## 2024-06-21 PROBLEM — Z71.89 ACP (ADVANCE CARE PLANNING): Status: RESOLVED | Noted: 2024-06-20 | Resolved: 2024-06-21

## 2024-06-27 ENCOUNTER — TELEPHONE (OUTPATIENT)
Dept: FAMILY MEDICINE | Facility: CLINIC | Age: 69
End: 2024-06-27
Payer: MEDICAID

## 2024-06-27 RX ORDER — FLUCONAZOLE 100 MG/1
100 TABLET ORAL DAILY
Qty: 1 TABLET | Refills: 0 | Status: SHIPPED | OUTPATIENT
Start: 2024-06-27 | End: 2024-07-27

## 2024-06-27 NOTE — TELEPHONE ENCOUNTER
Patient called stating that she was just finishing the antibiotic Ceftin because it states to let your doctor know if you are having labs drawn.   The patient also said that she has developed a yeast infection from the antibiotics and is requesting diflucan to be called in.  Please advise.

## 2024-07-01 ENCOUNTER — TELEPHONE (OUTPATIENT)
Dept: ADMINISTRATIVE | Facility: OTHER | Age: 69
End: 2024-07-01
Payer: MEDICAID

## 2024-07-01 ENCOUNTER — OUTPATIENT CASE MANAGEMENT (OUTPATIENT)
Dept: ADMINISTRATIVE | Facility: OTHER | Age: 69
End: 2024-07-01
Payer: MEDICAID

## 2024-07-01 NOTE — LETTER
Luz Marina Moses  [address] [facility] [address]      Dear Luz Marina Moses,     I work with [facility] Outpatient Care Management Department. We received a referral to call you to discuss your medical history. These services are free of charge and are offered to [facility] patients who have recently been discharged from any of our facilities or who have medical conditions that may require the skill of a nurse to assist with management.     I am a Registered Nurse who specializes in connecting patients with available medical and financial resources as well as addressing any educational needs that may be indicated.    I attempted to reach you by telephone, but I was unsuccessful. Please call our department so that we can go over some questions with you, regarding your health.    The Outpatient Care Management Department can be reached at [phone], from 8:00AM to 4:30 PM, on Monday thru Friday.     Additionally, [facility] also has a program where a nurse is available 24/7 to answer questions or provide medical advice, their number is [phone].      Thanks,        Elisha Spencer RN  Outpatient Care Management  Phone #: [phone]

## 2024-07-03 ENCOUNTER — LAB VISIT (OUTPATIENT)
Dept: LAB | Facility: HOSPITAL | Age: 69
End: 2024-07-03
Attending: FAMILY MEDICINE
Payer: MEDICARE

## 2024-07-03 DIAGNOSIS — E78.2 MIXED DYSLIPIDEMIA: ICD-10-CM

## 2024-07-03 DIAGNOSIS — I10 ESSENTIAL HYPERTENSION: ICD-10-CM

## 2024-07-03 LAB
ALBUMIN SERPL BCP-MCNC: 3.5 G/DL (ref 3.5–5.2)
ALP SERPL-CCNC: 94 U/L (ref 55–135)
ALT SERPL W/O P-5'-P-CCNC: 13 U/L (ref 10–44)
ANION GAP SERPL CALC-SCNC: 8 MMOL/L (ref 8–16)
AST SERPL-CCNC: 11 U/L (ref 10–40)
BILIRUB SERPL-MCNC: 0.2 MG/DL (ref 0.1–1)
BUN SERPL-MCNC: 21 MG/DL (ref 8–23)
CALCIUM SERPL-MCNC: 9.3 MG/DL (ref 8.7–10.5)
CHLORIDE SERPL-SCNC: 104 MMOL/L (ref 95–110)
CHOLEST SERPL-MCNC: 230 MG/DL (ref 120–199)
CHOLEST/HDLC SERPL: 5.9 {RATIO} (ref 2–5)
CO2 SERPL-SCNC: 24 MMOL/L (ref 23–29)
CREAT SERPL-MCNC: 0.9 MG/DL (ref 0.5–1.4)
EST. GFR  (NO RACE VARIABLE): >60 ML/MIN/1.73 M^2
GLUCOSE SERPL-MCNC: 100 MG/DL (ref 70–110)
HDLC SERPL-MCNC: 39 MG/DL (ref 40–75)
HDLC SERPL: 17 % (ref 20–50)
LDLC SERPL CALC-MCNC: 150 MG/DL (ref 63–159)
NONHDLC SERPL-MCNC: 191 MG/DL
POTASSIUM SERPL-SCNC: 5.1 MMOL/L (ref 3.5–5.1)
PROT SERPL-MCNC: 7.4 G/DL (ref 6–8.4)
SODIUM SERPL-SCNC: 136 MMOL/L (ref 136–145)
TRIGL SERPL-MCNC: 205 MG/DL (ref 30–150)

## 2024-07-03 PROCEDURE — 36415 COLL VENOUS BLD VENIPUNCTURE: CPT | Mod: PO | Performed by: FAMILY MEDICINE

## 2024-07-03 PROCEDURE — 80053 COMPREHEN METABOLIC PANEL: CPT | Performed by: FAMILY MEDICINE

## 2024-07-03 PROCEDURE — 80061 LIPID PANEL: CPT | Performed by: FAMILY MEDICINE

## 2024-07-05 ENCOUNTER — OFFICE VISIT (OUTPATIENT)
Dept: FAMILY MEDICINE | Facility: CLINIC | Age: 69
End: 2024-07-05
Payer: MEDICAID

## 2024-07-05 VITALS
HEART RATE: 90 BPM | BODY MASS INDEX: 24.77 KG/M2 | DIASTOLIC BLOOD PRESSURE: 72 MMHG | SYSTOLIC BLOOD PRESSURE: 137 MMHG | OXYGEN SATURATION: 98 % | WEIGHT: 154.13 LBS | HEIGHT: 66 IN

## 2024-07-05 DIAGNOSIS — R93.0 ABNORMAL HEAD CT: ICD-10-CM

## 2024-07-05 DIAGNOSIS — R42 VERTIGO: ICD-10-CM

## 2024-07-05 DIAGNOSIS — J44.9 CHRONIC OBSTRUCTIVE PULMONARY DISEASE, UNSPECIFIED COPD TYPE: ICD-10-CM

## 2024-07-05 DIAGNOSIS — Z09 HOSPITAL DISCHARGE FOLLOW-UP: Primary | ICD-10-CM

## 2024-07-05 DIAGNOSIS — R11.0 NAUSEA: ICD-10-CM

## 2024-07-05 DIAGNOSIS — I70.0 AORTIC ATHEROSCLEROSIS: ICD-10-CM

## 2024-07-05 PROCEDURE — 99999 PR PBB SHADOW E&M-EST. PATIENT-LVL V: CPT | Mod: PBBFAC,,, | Performed by: FAMILY MEDICINE

## 2024-07-05 PROCEDURE — 99215 OFFICE O/P EST HI 40 MIN: CPT | Mod: PBBFAC,PN | Performed by: FAMILY MEDICINE

## 2024-07-05 RX ORDER — MECLIZINE HYDROCHLORIDE 25 MG/1
25 TABLET ORAL 3 TIMES DAILY PRN
Qty: 90 TABLET | Refills: 1 | Status: SHIPPED | OUTPATIENT
Start: 2024-07-05

## 2024-07-05 RX ORDER — ALBUTEROL SULFATE 0.83 MG/ML
2.5 SOLUTION RESPIRATORY (INHALATION)
Qty: 270 ML | Refills: 0 | Status: SHIPPED | OUTPATIENT
Start: 2024-07-05 | End: 2024-08-04

## 2024-07-05 RX ORDER — FLUTICASONE FUROATE AND VILANTEROL 200; 25 UG/1; UG/1
1 POWDER RESPIRATORY (INHALATION) DAILY
Qty: 60 EACH | Refills: 0 | Status: SHIPPED | OUTPATIENT
Start: 2024-07-05

## 2024-07-05 RX ORDER — ONDANSETRON HYDROCHLORIDE 8 MG/1
8 TABLET, FILM COATED ORAL EVERY 12 HOURS PRN
Qty: 20 TABLET | Refills: 0 | Status: SHIPPED | OUTPATIENT
Start: 2024-07-05

## 2024-07-05 NOTE — PROGRESS NOTES
SUBJECTIVE:    Patient ID: Luz Marina Moses is a 69 y.o. female.    Chief Complaint: Hospital Follow Up  67 yo female here today  to follow-up on her most recent hospitalization for COPD /Pneumonia, pt was discharged on Breo and antibiotics. I had referred her to pulmonary > 1 year ago but she never followed up with pulmonary. During this admission pt had a head CT due to some confusion and it was incidentally noted to have.  A approximately 1.3 x 1.7 x 1.1 cm low-attenuation cystic appearing density in the tip of the right temporal lobe with similar densities to CSF. It is possible this may represent a cystic encephalomalacia or a glial cyst.       SPMHX:  CPOD: Breo,  Albuterol 90mcg,   HTN: Lisinopril 20mg, Triamterene HCTZ 37.5/25mg, blood pressure is not well controlled  Hyperlipidemia: (Statin Myopathy)  On Zetia 10mg ASCVD is elevate pt continues to decline starting on low dose statin.  Chronic Hep C: Completed treatment  Osteoporosis: Fosamax 70mg (Pt not really taking) Last Dexa was   Allergic Rhinitis: Flonase,  Depression: Wellbutrin 75mg  Fibromyalgia: Gabapentin 600mg   Vertigo: Meclizine 25mg,      Specialists:  Gastro: Dr Valerio   Pain Management: Dr Rodas ([place])   ENT:   Rheum:      Smoke: 1/2 ppd  ETOH: none  Exercise: None     Glucose 70 - 110 mg/dL 100     Creatinine 0.5 - 1.4 mg/dL 0.9     eGFR >60 mL/min/1.73 m^2 >60.0       Lipid  Chol: 230  Tri  HDL: 39  LDL: 150          HPI    Admit Date: 2024   Discharge Date: 2024   Discharge Facility: Hospital      Family and/or Caretaker present at visit? No  Medication Reconciliation:  Medications changed/added/deleted. Discharged on   START taking these medications       cefUROXime 500 MG tablet  Commonly known as: CEFTIN  Take 1 tablet (500 mg total) by mouth every 12 (twelve) hours. for 7 days      CULTURELLE 10 billion cell capsule  Generic drug: Lactobacillus rhamnosus GG  Take 1 capsule by mouth 2 (two) times a day. for  7 days      fluticasone furoate-vilanteroL 200-25 mcg/dose Dsdv diskus inhaler  Commonly known as: BREO  Inhale 1 puff into the lungs once daily. Controller      guaiFENesin 600 mg 12 hr tablet  Commonly known as: MUCINEX  Take 1 tablet (600 mg total) by mouth 2 (two) times daily. for 7 days      nicotine 21 mg/24 hr  Commonly known as: NICODERM CQ  Place 1 patch onto the skin once daily. for 7 days     New Prescriptions filled after discharge: yes  Discharge summary reviewed:  yes  Diagnostic tests reviewed/disposition: I have reviewed all completed as well as pending diagnostic tests at the time of discharge  Disease/illness education: yes  Follow up appointments scheduled:  no                Follow up labs/tests ordered:   no  Home Health ordered on discharge: Patient does not have home health established from hospital visit.  They do not need home health.  If needed, we will set up home health for the patient  Home Health company name: NA  Establishment or re-establishment of referral orders for community resources: No other necessary community resources  DME ordered at discharge:   no  How patient is feeling since discharge from the hospital?  Improved     Discussion with other health care providers: No discussion with other health care providers necessary  Patient follow up phone call documented on separate encounter.    Lab Visit on 07/03/2024   Component Date Value Ref Range Status    Sodium 07/03/2024 136  136 - 145 mmol/L Final    Potassium 07/03/2024 5.1  3.5 - 5.1 mmol/L Final    Chloride 07/03/2024 104  95 - 110 mmol/L Final    CO2 07/03/2024 24  23 - 29 mmol/L Final    Glucose 07/03/2024 100  70 - 110 mg/dL Final    BUN 07/03/2024 21  8 - 23 mg/dL Final    Creatinine 07/03/2024 0.9  0.5 - 1.4 mg/dL Final    Calcium 07/03/2024 9.3  8.7 - 10.5 mg/dL Final    Total Protein 07/03/2024 7.4  6.0 - 8.4 g/dL Final    Albumin 07/03/2024 3.5  3.5 - 5.2 g/dL Final    Total Bilirubin 07/03/2024 0.2  0.1 - 1.0  mg/dL Final    Alkaline Phosphatase 07/03/2024 94  55 - 135 U/L Final    AST 07/03/2024 11  10 - 40 U/L Final    ALT 07/03/2024 13  10 - 44 U/L Final    eGFR 07/03/2024 >60.0  >60 mL/min/1.73 m^2 Final    Anion Gap 07/03/2024 8  8 - 16 mmol/L Final    Cholesterol 07/03/2024 230 (H)  120 - 199 mg/dL Final    Triglycerides 07/03/2024 205 (H)  30 - 150 mg/dL Final    HDL 07/03/2024 39 (L)  40 - 75 mg/dL Final    LDL Cholesterol 07/03/2024 150.0  63.0 - 159.0 mg/dL Final    HDL/Cholesterol Ratio 07/03/2024 17.0 (L)  20.0 - 50.0 % Final    Total Cholesterol/HDL Ratio 07/03/2024 5.9 (H)  2.0 - 5.0 Final    Non-HDL Cholesterol 07/03/2024 191  mg/dL Final   Admission on 06/20/2024, Discharged on 06/21/2024   Component Date Value Ref Range Status    Specimen UA 06/20/2024 Urine, Clean Catch   Final    Color, UA 06/20/2024 Yellow  Yellow, Straw, Candida Final    Appearance, UA 06/20/2024 Clear  Clear Final    pH, UA 06/20/2024 5.5  5.0 - 8.0 Final    Specific Gravity, UA 06/20/2024 1.020  1.005 - 1.030 Final    Protein, UA 06/20/2024 1+ (A)  Negative Final    Glucose, UA 06/20/2024 Negative  Negative Final    Ketones, UA 06/20/2024 Negative  Negative Final    Bilirubin (UA) 06/20/2024 Negative  Negative Final    Occult Blood UA 06/20/2024 Negative  Negative Final    Nitrite, UA 06/20/2024 Negative  Negative Final    Urobilinogen, UA 06/20/2024 1.0  <2.0 EU/dL Final    Leukocytes, UA 06/20/2024 Negative  Negative Final    Lactate (Lactic Acid) 06/20/2024 1.3  0.5 - 2.2 mmol/L Final    Sodium 06/20/2024 135 (L)  136 - 145 mmol/L Final    Potassium 06/20/2024 3.8  3.5 - 5.1 mmol/L Final    Chloride 06/20/2024 101  95 - 110 mmol/L Final    CO2 06/20/2024 25  22 - 31 mmol/L Final    Glucose 06/20/2024 135 (H)  70 - 110 mg/dL Final    BUN 06/20/2024 37 (H)  7 - 18 mg/dL Final    Creatinine 06/20/2024 1.25  0.50 - 1.40 mg/dL Final    Calcium 06/20/2024 9.2  8.4 - 10.2 mg/dL Final    Total Protein 06/20/2024 7.9  6.0 - 8.4 g/dL Final     Albumin 06/20/2024 4.0  3.5 - 5.2 g/dL Final    Total Bilirubin 06/20/2024 0.6  0.2 - 1.3 mg/dL Final    Alkaline Phosphatase 06/20/2024 174 (H)  38 - 145 U/L Final    AST 06/20/2024 44 (H)  14 - 36 U/L Final    ALT 06/20/2024 48 (H)  0 - 35 U/L Final    Anion Gap 06/20/2024 9  5 - 12 mmol/L Final    eGFR 06/20/2024 47 (A)  >60 mL/min/1.73 m^2 Final    WBC 06/20/2024 23.33 (H)  3.90 - 12.70 K/uL Final    RBC 06/20/2024 3.35 (L)  4.00 - 5.40 M/uL Final    Hemoglobin 06/20/2024 11.0 (L)  12.0 - 16.0 g/dL Final    Hematocrit 06/20/2024 31.7 (L)  37.0 - 48.5 % Final    MCV 06/20/2024 95  82 - 98 fL Final    MCH 06/20/2024 32.8 (H)  27.0 - 31.0 pg Final    MCHC 06/20/2024 34.7  32.0 - 36.0 g/dL Final    RDW 06/20/2024 13.7  11.5 - 14.5 % Final    Platelets 06/20/2024 485 (H)  150 - 450 K/uL Final    MPV 06/20/2024 9.1 (L)  9.2 - 12.9 fL Final    Immature Granulocytes 06/20/2024 1.0 (H)  0.0 - 0.5 % Final    Gran # (ANC) 06/20/2024 20.1 (H)  1.8 - 7.7 K/uL Final    Immature Grans (Abs) 06/20/2024 0.23 (H)  0.00 - 0.04 K/uL Final    Lymph # 06/20/2024 1.5  1.0 - 4.8 K/uL Final    Mono # 06/20/2024 1.5 (H)  0.3 - 1.0 K/uL Final    Eos # 06/20/2024 0.0  0.0 - 0.5 K/uL Final    Baso # 06/20/2024 0.04  0.00 - 0.20 K/uL Final    nRBC 06/20/2024 0  0 /100 WBC Final    Gran % 06/20/2024 86.0 (H)  38.0 - 73.0 % Final    Lymph % 06/20/2024 6.3 (L)  18.0 - 48.0 % Final    Mono % 06/20/2024 6.4  4.0 - 15.0 % Final    Eosinophil % 06/20/2024 0.1  0.0 - 8.0 % Final    Basophil % 06/20/2024 0.2  0.0 - 1.9 % Final    Platelet Estimate 06/20/2024 Appears normal   Final    Differential Method 06/20/2024 Automated   Final    Blood Culture, Routine 06/20/2024 No growth after 5 days.   Final    Blood Culture, Routine 06/20/2024 No growth after 5 days.   Final    Magnesium 06/20/2024 1.8  1.6 - 2.6 mg/dL Final    Amphetamine Screen, Ur 06/20/2024 Negative  Negative Final    Barbiturate Screen, Ur 06/20/2024 Negative  Negative Final     Benzodiazepines 06/20/2024 Negative  Negative Final    Cocaine (Metab.) 06/20/2024 Negative  Negative Final    Opiate Scrn, Ur 06/20/2024 Presumptive Positive (A)  Negative Final    Phencyclidine 06/20/2024 Negative  Negative Final    THC 06/20/2024 Presumptive Positive (A)  Negative Final    Tricyclic Antidepressants (TCA), U* 06/20/2024 Negative  Negative Final    Creatinine, Urine 06/20/2024 83.2  15.0 - 325.0 mg/dL Final    Toxicology Information 06/20/2024 SEE COMMENT   Final    SARS-CoV2 (COVID-19) Qualitative P* 06/20/2024 Negative  Negative Final    Influenza A, Molecular 06/20/2024 Negative  Negative Final    Influenza B, Molecular 06/20/2024 Negative  Negative Final    RBC, UA 06/20/2024 1  0 - 4 /hpf Final    WBC, UA 06/20/2024 0  0 - 5 /hpf Final    Bacteria 06/20/2024 Negative  Negative /hpf Final    Squam Epithel, UA 06/20/2024 1  /hpf Final    Hyaline Casts, UA 06/20/2024 0  0 - 1 /lpf Final    Microscopic Comment 06/20/2024 SEE COMMENT   Final    QRS Duration 06/20/2024 82  ms Final    [facility] QTC Calculation 06/20/2024 379  ms Final    Procalcitonin 06/20/2024 0.28 (H)  <0.25 ng/mL Final    Troponin I 06/20/2024 <0.012  0.012 - 0.034 ng/mL Final    MRSA SCREEN BY PCR 06/20/2024 Negative  Negative Final    POCT Glucose 06/20/2024 127 (H)  70 - 110 mg/dL Final    Sodium 06/21/2024 133 (L)  136 - 145 mmol/L Final    Potassium 06/21/2024 3.1 (L)  3.5 - 5.1 mmol/L Final    Chloride 06/21/2024 102  95 - 110 mmol/L Final    CO2 06/21/2024 23  22 - 31 mmol/L Final    Glucose 06/21/2024 109  70 - 110 mg/dL Final    BUN 06/21/2024 23 (H)  7 - 18 mg/dL Final    Creatinine 06/21/2024 0.76  0.50 - 1.40 mg/dL Final    Calcium 06/21/2024 8.1 (L)  8.4 - 10.2 mg/dL Final    Total Protein 06/21/2024 6.3  6.0 - 8.4 g/dL Final    Albumin 06/21/2024 3.1 (L)  3.5 - 5.2 g/dL Final    Total Bilirubin 06/21/2024 0.5  0.2 - 1.3 mg/dL Final    Alkaline Phosphatase 06/21/2024 136  38 - 145 U/L Final    AST 06/21/2024 32  14 - 36  U/L Final    ALT 06/21/2024 34  0 - 35 U/L Final    Anion Gap 06/21/2024 8  5 - 12 mmol/L Final    eGFR 06/21/2024 >60  >60 mL/min/1.73 m^2 Final    WBC 06/21/2024 19.83 (H)  3.90 - 12.70 K/uL Final    RBC 06/21/2024 2.79 (L)  4.00 - 5.40 M/uL Final    Hemoglobin 06/21/2024 9.0 (L)  12.0 - 16.0 g/dL Final    Hematocrit 06/21/2024 26.8 (L)  37.0 - 48.5 % Final    MCV 06/21/2024 96  82 - 98 fL Final    MCH 06/21/2024 32.3 (H)  27.0 - 31.0 pg Final    MCHC 06/21/2024 33.6  32.0 - 36.0 g/dL Final    RDW 06/21/2024 13.9  11.5 - 14.5 % Final    Platelets 06/21/2024 416  150 - 450 K/uL Final    MPV 06/21/2024 9.2  9.2 - 12.9 fL Final    Immature Granulocytes 06/21/2024 1.0 (H)  0.0 - 0.5 % Final    Gran # (ANC) 06/21/2024 17.1 (H)  1.8 - 7.7 K/uL Final    Immature Grans (Abs) 06/21/2024 0.20 (H)  0.00 - 0.04 K/uL Final    Lymph # 06/21/2024 1.2  1.0 - 4.8 K/uL Final    Mono # 06/21/2024 1.3 (H)  0.3 - 1.0 K/uL Final    Eos # 06/21/2024 0.0  0.0 - 0.5 K/uL Final    Baso # 06/21/2024 0.03  0.00 - 0.20 K/uL Final    nRBC 06/21/2024 0  0 /100 WBC Final    Gran % 06/21/2024 86.1 (H)  38.0 - 73.0 % Final    Lymph % 06/21/2024 6.2 (L)  18.0 - 48.0 % Final    Mono % 06/21/2024 6.4  4.0 - 15.0 % Final    Eosinophil % 06/21/2024 0.1  0.0 - 8.0 % Final    Basophil % 06/21/2024 0.2  0.0 - 1.9 % Final    Differential Method 06/21/2024 Automated   Final    Magnesium 06/21/2024 1.9  1.6 - 2.6 mg/dL Final    Phosphorus 06/21/2024 3.0  2.7 - 4.5 mg/dL Final    Procalcitonin 06/21/2024 0.29 (H)  <0.25 ng/mL Final       Past Medical History:   Diagnosis Date    Allergy     Anemia     Anxiety     Arthritis     COPD (chronic obstructive pulmonary disease)     Depression     Encounter for blood transfusion     Encounter for screening mammogram for breast cancer 5/8/2018    Fibromyalgia     currently on disability for fibromyalgia.     H/O fibromyalgia     Heart murmur     Hepatitis C 01/2017    Hyperlipidemia     Hypertension     IBS (irritable  bowel syndrome)     Need for hepatitis C screening test 2018    Osteoarthritis     Osteoporosis     PTSD (post-traumatic stress disorder)      Past Surgical History:   Procedure Laterality Date     SECTION      FRACTURE SURGERY      HYSTERECTOMY      partial     LEG SURGERY Left 16    orif    TONSILLECTOMY       Family History   Problem Relation Name Age of Onset    Arthritis Mother      Kidney disease Mother      Depression Mother      Diabetes Mother      Early death Mother      Hypertension Mother      Mental illness Mother      Miscarriages / Stillbirths Mother      Stroke Sister      Breast cancer Sister      Stroke Brother      Hyperlipidemia Brother      Stroke Sister      Alcohol abuse Brother      Liver disease Brother      Thrombophlebitis Other niece     Cancer Sister  45        breast     Arthritis Sister      Heart disease Maternal Grandfather         Marital Status:   Alcohol History:  reports no history of alcohol use.  Tobacco History:  reports that she has been smoking cigarettes. She started smoking about 53 years ago. She has a 80.3 pack-year smoking history. She has never used smokeless tobacco.  Drug History:  reports no history of drug use.    Review of patient's allergies indicates:   Allergen Reactions    Atorvastatin     Red dye Diarrhea and Rash    Spiriva respimat [tiotropium bromide] Anaphylaxis    Augmentin [amoxicillin-pot clavulanate]     Crestor [rosuvastatin] Other (See Comments)     Chest Pain    Lodine [etodolac] Other (See Comments)     Dizziness     Xarelto [rivaroxaban]      Blister on tongue and soreness all over mouth        Current Outpatient Medications:     albuterol (PROVENTIL/VENTOLIN HFA) 90 mcg/actuation inhaler, INHALE 2 PUFFS INTO THE LUNGS EVERY 6 HOURS AS NEEDED FOR WHEEZING. RESCUE, Disp: 18 g, Rfl: 3    buPROPion (WELLBUTRIN XL) 300 MG 24 hr tablet, Take 1 tablet (300 mg total) by mouth once daily., Disp: 30 tablet, Rfl: 11    CEFUROXIME  AXETIL ORAL, Take 500 mg by mouth., Disp: , Rfl:     diclofenac sodium (VOLTAREN) 1 % Gel, Apply 2 g topically 4 (four) times daily., Disp: , Rfl:     ezetimibe (ZETIA) 10 mg tablet, TAKE 1 TABLET BY MOUTH ONCE DAILY (Patient taking differently: Take 10 mg by mouth once daily.), Disp: 30 tablet, Rfl: 3    fexofenadine HCl (MUCINEX ALLERGY ORAL), Take 1 tablet by mouth every 4 (four) hours as needed (congestion)., Disp: , Rfl:     fluconazole (DIFLUCAN) 100 MG tablet, Take 1 tablet (100 mg total) by mouth once daily., Disp: 1 tablet, Rfl: 0    fluticasone propionate (FLONASE) 50 mcg/actuation nasal spray, USE 2 SPRAYS IN EACH NOSTRIL ONCE DAILY AS DIRECTED (Patient taking differently: 2 sprays by Each Nostril route once daily.), Disp: 16 g, Rfl: 3    gabapentin (NEURONTIN) 800 MG tablet, Take 800 mg by mouth 3 (three) times daily., Disp: , Rfl:     HYDROcodone-acetaminophen (NORCO)  mg per tablet, Take 1 tablet by mouth 3 (three) times daily., Disp: , Rfl:     ibuprofen (ADVIL,MOTRIN) 200 MG tablet, Take 3 tablets (600 mg total) by mouth daily as needed for Pain., Disp: , Rfl:     lisinopriL (PRINIVIL,ZESTRIL) 40 MG tablet, Take 1 tablet (40 mg total) by mouth once daily., Disp: 90 tablet, Rfl: 3    methocarbamoL (ROBAXIN) 750 MG Tab, Take 750 mg by mouth 3 (three) times daily., Disp: , Rfl:     NARCAN 4 mg/actuation Spry, 1 spray by Nasal route once., Disp: , Rfl:     triamterene-hydrochlorothiazide 37.5-25 mg (MAXZIDE-25) 37.5-25 mg per tablet, Take 1 tablet by mouth once daily. Hold for one week, restart 6/28/24, Disp: 90 tablet, Rfl: 1    albuterol (PROVENTIL) 2.5 mg /3 mL (0.083 %) nebulizer solution, Take 3 mLs (2.5 mg total) by nebulization every 6 (six) hours while awake. Rescue, Disp: 270 mL, Rfl: 0    fluticasone furoate-vilanteroL (BREO) 200-25 mcg/dose DsDv diskus inhaler, Inhale 1 puff into the lungs once daily. Controller, Disp: 60 each, Rfl: 0    meclizine (ANTIVERT) 25 mg tablet, Take 1 tablet  "(25 mg total) by mouth 3 (three) times daily as needed., Disp: 90 tablet, Rfl: 1    ondansetron (ZOFRAN) 8 MG tablet, Take 1 tablet (8 mg total) by mouth every 12 (twelve) hours as needed for Nausea. TAKE 1 TABLET (8 MG TOTAL) BY MOUTH EVERY 12 HOURS AS NEEDED FOR NAUSEA Strength: 8 mg, Disp: 20 tablet, Rfl: 0    Current Facility-Administered Medications:     EPINEPHrine (EPIPEN) 0.3 mg/0.3 mL pen injection 0.3 mg, 0.3 mg, Intramuscular, PRN, Gianna Gomez, FNP    Review of Systems   Constitutional:  Negative for activity change, appetite change, fatigue and unexpected weight change.   HENT:  Negative for congestion, ear pain, hearing loss, postnasal drip, sinus pressure, sinus pain, sneezing and sore throat.    Eyes:  Negative for photophobia and pain.   Respiratory:  Negative for cough, choking, chest tightness, shortness of breath and wheezing.    Cardiovascular:  Negative for chest pain, palpitations and leg swelling.   Gastrointestinal:  Negative for abdominal distention, abdominal pain, blood in stool, constipation, diarrhea, nausea and vomiting.   Endocrine: Negative for cold intolerance, heat intolerance, polydipsia and polyuria.   Genitourinary:  Negative for difficulty urinating, dysuria, flank pain, frequency, hematuria, pelvic pain and urgency.   Musculoskeletal:  Negative for arthralgias, back pain, joint swelling and neck pain.   Skin:  Negative for pallor.   Allergic/Immunologic: Negative for environmental allergies and food allergies.   Neurological:  Negative for dizziness, weakness, light-headedness, numbness and headaches.   Hematological:  Does not bruise/bleed easily.   Psychiatric/Behavioral:  Negative for agitation, confusion, decreased concentration, sleep disturbance and suicidal ideas. The patient is not nervous/anxious.         Objective:      Vitals:    07/05/24 0947 07/05/24 1052   BP: (!) 148/84 137/72   Pulse: 90    SpO2: 98%    Weight: 69.9 kg (154 lb 1.6 oz)    Height: 5' 6" " (1.676 m)      Physical Exam  Vitals reviewed.   Constitutional:       General: She is not in acute distress.     Appearance: Normal appearance. She is well-developed. She is not ill-appearing or toxic-appearing.   HENT:      Head: Normocephalic and atraumatic.      Right Ear: External ear normal.      Left Ear: External ear normal.   Eyes:      General:         Right eye: No discharge.         Left eye: No discharge.      Conjunctiva/sclera: Conjunctivae normal.      Pupils: Pupils are equal, round, and reactive to light.   Cardiovascular:      Rate and Rhythm: Normal rate and regular rhythm.      Heart sounds: Normal heart sounds. No murmur heard.  Pulmonary:      Effort: Pulmonary effort is normal. No respiratory distress.      Breath sounds: Normal breath sounds. No wheezing or rhonchi.   Skin:     General: Skin is warm and dry.   Neurological:      Mental Status: She is alert.         Assessment:       1. Hospital discharge follow-up    2. Chronic obstructive pulmonary disease, unspecified COPD type    3. Abnormal head CT    4. Aortic atherosclerosis    5. Nausea    6. Vertigo         Plan:       Hospital discharge follow-up  Pt doing well , breathing has improved, will need to get follow up with Pulmonary, she is enrolled in smoking cessation.    Chronic obstructive pulmonary disease, unspecified COPD type  -     Ambulatory referral/consult to Pulmonology; Future; Expected date: 07/12/2024    Abnormal head CT  -     Ambulatory referral/consult to Neurology; Future; Expected date: 07/12/2024  Abnormal head CT will have her evaluataed by neurology.    Aortic atherosclerosis  Comments:  The thoracic aorta is normal caliber with atherosclerosis. Coronary artery atherosclerosis  observed.    Nausea  -     ondansetron (ZOFRAN) 8 MG tablet; Take 1 tablet (8 mg total) by mouth every 12 (twelve) hours as needed for Nausea. TAKE 1 TABLET (8 MG TOTAL) BY MOUTH EVERY 12 HOURS AS NEEDED FOR NAUSEA Strength: 8 mg  Dispense:  20 tablet; Refill: 0    Vertigo  -     meclizine (ANTIVERT) 25 mg tablet; Take 1 tablet (25 mg total) by mouth 3 (three) times daily as needed.  Dispense: 90 tablet; Refill: 1    Other orders  -     albuterol (PROVENTIL) 2.5 mg /3 mL (0.083 %) nebulizer solution; Take 3 mLs (2.5 mg total) by nebulization every 6 (six) hours while awake. Rescue  Dispense: 270 mL; Refill: 0  -     fluticasone furoate-vilanteroL (BREO) 200-25 mcg/dose DsDv diskus inhaler; Inhale 1 puff into the lungs once daily. Controller  Dispense: 60 each; Refill: 0      Follow up in about 6 months (around 1/5/2025) for HTN.

## 2024-07-05 NOTE — PROGRESS NOTES
SUBJECTIVE:    Patient ID: Luz Marina Moses is a 69 y.o. female.    Chief Complaint: No chief complaint on file.  69 yo female here today  to follow-up on her hypertension, COPD, Hyperlipidemia.  her blood pressure is  not well controlled today, she denies any chest pain shortness a breath or dyspnea on exertion. Will need to increase her medications after this visit.         Reviewed her overdue health maintenance patient is due for COVID-19 vaccine, pneumococcal vaccine, colorectal cancer screening, shingles vaccine. Pt declines all vaccines.  We discussed the risks of pneumonia in a 68-year-old patient with COPD patient still does not want to get a pneumonia vaccine.     SPMHX:  CPOD: Breo,  Albuterol 90mcg,   HTN: Lisinopril 20mg, Triamterene HCTZ 37.5/25mg, blood pressure is not well controlled  Hyperlipidemia: (Statin Myopathy)  On Zetia 10mg ASCVD is elevate pt continues to decline starting on low dose statin.  Chronic Hep C: Completed treatment  Osteoporosis: Fosamax 70mg (Pt not really taking) Last Dexa was   Allergic Rhinitis: Flonase,  Depression: Wellbutrin 75mg  Fibromyalgia: Gabapentin 600mg   Vertigo: Meclizine 25mg,      Specialists:  Gastro: Dr Valerio   Pain Management: Dr Rodas ([place])   ENT:   Rheum:      Smoke: 1/2 ppd  ETOH: none  Exercise: None     Glucose 70 - 110 mg/dL 100     Creatinine 0.5 - 1.4 mg/dL 0.9     eGFR >60 mL/min/1.73 m^2 >60.0       Lipid  Chol: 230  Tri  HDL: 39  LDL: 150      Discharged on   START taking these medications       cefUROXime 500 MG tablet  Commonly known as: CEFTIN  Take 1 tablet (500 mg total) by mouth every 12 (twelve) hours. for 7 days      CULTURELLE 10 billion cell capsule  Generic drug: Lactobacillus rhamnosus GG  Take 1 capsule by mouth 2 (two) times a day. for 7 days      fluticasone furoate-vilanteroL 200-25 mcg/dose Dsdv diskus inhaler  Commonly known as: BREO  Inhale 1 puff into the lungs once daily. Controller      guaiFENesin 600 mg  12 hr tablet  Commonly known as: MUCINEX  Take 1 tablet (600 mg total) by mouth 2 (two) times daily. for 7 days      nicotine 21 mg/24 hr  Commonly known as: NICODERM CQ  Place 1 patch onto the skin once daily. for 7 days     HPI      Past Medical History:   Diagnosis Date    Allergy     Anemia     Anxiety     Arthritis     COPD (chronic obstructive pulmonary disease)     Depression     Encounter for blood transfusion     Encounter for screening mammogram for breast cancer 2018    Fibromyalgia     currently on disability for fibromyalgia.     H/O fibromyalgia     Heart murmur     Hepatitis C 2017    Hyperlipidemia     Hypertension     IBS (irritable bowel syndrome)     Need for hepatitis C screening test 2018    Osteoarthritis     Osteoporosis     PTSD (post-traumatic stress disorder)      Social History     Socioeconomic History    Marital status:     Number of children: 2   Occupational History    Occupation: Disabled-      Comment: PTSD, Archana   Tobacco Use    Smoking status: Every Day     Current packs/day: 1.50     Average packs/day: 1.5 packs/day for 53.5 years (80.3 ttl pk-yrs)     Types: Cigarettes     Start date: 1971    Smokeless tobacco: Never   Substance and Sexual Activity    Alcohol use: No     Comment: denies use    Drug use: No    Sexual activity: Not Currently   Social History Narrative    Currently on disability for fibromyalgia.         1 step son and 1 son.     Social Determinants of Health     Stress: No Stress Concern Present (2020)    [employer] [place] [employer] - Occupational Stress Questionnaire     Feeling of Stress : Only a little     Past Surgical History:   Procedure Laterality Date     SECTION      FRACTURE SURGERY      HYSTERECTOMY      partial     LEG SURGERY Left 16    orif    TONSILLECTOMY       Family History   Problem Relation Name Age of Onset    Arthritis Mother      Kidney disease Mother      Depression Mother       Diabetes Mother      Early death Mother      Hypertension Mother      Mental illness Mother      Miscarriages / Stillbirths Mother      Stroke Sister      Breast cancer Sister      Stroke Brother      Hyperlipidemia Brother      Stroke Sister      Alcohol abuse Brother      Liver disease Brother      Thrombophlebitis Other niece     Cancer Sister  45        breast     Arthritis Sister      Heart disease Maternal Grandfather       Current Outpatient Medications   Medication Sig Dispense Refill    albuterol (PROVENTIL) 2.5 mg /3 mL (0.083 %) nebulizer solution Take 3 mLs (2.5 mg total) by nebulization every 6 (six) hours while awake. Rescue 270 mL 0    albuterol (PROVENTIL/VENTOLIN HFA) 90 mcg/actuation inhaler INHALE 2 PUFFS INTO THE LUNGS EVERY 6 HOURS AS NEEDED FOR WHEEZING. RESCUE 18 g 3    buPROPion (WELLBUTRIN XL) 300 MG 24 hr tablet Take 1 tablet (300 mg total) by mouth once daily. 30 tablet 11    diclofenac sodium (VOLTAREN) 1 % Gel Apply 2 g topically 4 (four) times daily.      ezetimibe (ZETIA) 10 mg tablet TAKE 1 TABLET BY MOUTH ONCE DAILY (Patient taking differently: Take 10 mg by mouth once daily.) 30 tablet 3    fexofenadine HCl (MUCINEX ALLERGY ORAL) Take 1 tablet by mouth every 4 (four) hours as needed (congestion).      fluconazole (DIFLUCAN) 100 MG tablet Take 1 tablet (100 mg total) by mouth once daily. 1 tablet 0    fluticasone furoate-vilanteroL (BREO) 200-25 mcg/dose DsDv diskus inhaler Inhale 1 puff into the lungs once daily. Controller 60 each 0    fluticasone propionate (FLONASE) 50 mcg/actuation nasal spray USE 2 SPRAYS IN EACH NOSTRIL ONCE DAILY AS DIRECTED (Patient taking differently: 2 sprays by Each Nostril route once daily.) 16 g 3    gabapentin (NEURONTIN) 800 MG tablet Take 800 mg by mouth 3 (three) times daily.      HYDROcodone-acetaminophen (NORCO)  mg per tablet Take 1 tablet by mouth 3 (three) times daily.      ibuprofen (ADVIL,MOTRIN) 200 MG tablet Take 3 tablets (600 mg  total) by mouth daily as needed for Pain.      LIDOcaine (LIDODERM) 5 % Place 1 patch onto the skin every 24 hours. Remove & Discard patch within 12 hours or as directed by MD      lisinopriL (PRINIVIL,ZESTRIL) 40 MG tablet Take 1 tablet (40 mg total) by mouth once daily. 90 tablet 3    meclizine (ANTIVERT) 25 mg tablet Take 1 tablet (25 mg total) by mouth 3 (three) times daily as needed. 90 tablet 1    methocarbamoL (ROBAXIN) 750 MG Tab Take 750 mg by mouth 3 (three) times daily.      NARCAN 4 mg/actuation Spry 1 spray by Nasal route once.      ondansetron (ZOFRAN) 8 MG tablet TAKE 1 TABLET (8 MG TOTAL) BY MOUTH EVERY 12 HOURS AS NEEDED FOR NAUSEA (Patient taking differently: Take 8 mg by mouth every 12 (twelve) hours as needed for Nausea.) 20 tablet 0    triamterene-hydrochlorothiazide 37.5-25 mg (MAXZIDE-25) 37.5-25 mg per tablet Take 1 tablet by mouth once daily. Hold for one week, restart 6/28/24 90 tablet 1     Current Facility-Administered Medications   Medication Dose Route Frequency Provider Last Rate Last Admin    EPINEPHrine (EPIPEN) 0.3 mg/0.3 mL pen injection 0.3 mg  0.3 mg Intramuscular PRN Gianna Gomez, BRENNA         Review of patient's allergies indicates:   Allergen Reactions    Atorvastatin     Red dye Diarrhea and Rash    Spiriva respimat [tiotropium bromide] Anaphylaxis    Augmentin [amoxicillin-pot clavulanate]     Crestor [rosuvastatin] Other (See Comments)     Chest Pain    Lodine [etodolac] Other (See Comments)     Dizziness     Xarelto [rivaroxaban]      Blister on tongue and soreness all over mouth        Review of Systems       There were no vitals taken for this visit. There is no height or weight on file to calculate BMI.   Objective:      Physical Exam        Assessment:       No diagnosis found.     Plan:           There are no diagnoses linked to this encounter.

## 2024-07-08 ENCOUNTER — TELEPHONE (OUTPATIENT)
Dept: PULMONOLOGY | Facility: CLINIC | Age: 69
End: 2024-07-08
Payer: MEDICARE

## 2024-07-10 ENCOUNTER — TELEPHONE (OUTPATIENT)
Dept: FAMILY MEDICINE | Facility: CLINIC | Age: 69
End: 2024-07-10
Payer: MEDICARE

## 2024-07-10 ENCOUNTER — OUTPATIENT CASE MANAGEMENT (OUTPATIENT)
Dept: ADMINISTRATIVE | Facility: OTHER | Age: 69
End: 2024-07-10
Payer: MEDICARE

## 2024-07-10 NOTE — PROGRESS NOTES
7/10/2024  2nd attempt to complete Initial Assessment  for Outpatient Care Management, left message.

## 2024-07-10 NOTE — TELEPHONE ENCOUNTER
Judi this does not make sense, I ordered BREO and it was denied.    They recommend  Breo Ellipta powder for inhalation.    What am I supposed to order?

## 2024-07-10 NOTE — TELEPHONE ENCOUNTER
GREG PATINO Denied            Note from payer: The drug you asked for is not listed in your preferred drug list (formulary). The preferred drug(s), you may not have tried, are: Advair HFA aerosol inhaler (BRAND NAME) Breo Ellipta powder for inhalation (BRAND NAME) fluticasone propionate-salmeterol inhalation powder blister Wixela Inhub powder for inhalation Your provider needs to give us medical reasons why the preferred drug(s) would not work for you and/or would have bad side effects. Sometimes a preferred drug needs more review for approval. Additionally, some preferred drugs listed may be the same drugs with different strengths or forms. [employer] may only require one strength or form of that drug to be tried. This decision was from [employer] Non-Formulary Exceptions Coverage Policy.

## 2024-07-11 ENCOUNTER — OUTPATIENT CASE MANAGEMENT (OUTPATIENT)
Dept: ADMINISTRATIVE | Facility: OTHER | Age: 69
End: 2024-07-11
Payer: MEDICARE

## 2024-07-11 NOTE — TELEPHONE ENCOUNTER
My take on the situation is they are saying the patient may have not tried the preferred formulary of the name brand only for the following:   Breo Ellipta powder MAGDA  Advair HFA aerosol inhaler MAGDA  Wixela Inhub (fluticasone propionate-salmeterol inhalation powder blister) MAGDA

## 2024-07-11 NOTE — PROGRESS NOTES
Outpatient Care Management    Patient: Luz Marina Moses  MRN:  [id number]  Date of Service:  7/11/2024  Completed by:  Elisha Spencer RN    Chief Complaint   Patient presents with    OPCM Enrollment Call     7/11/2024  3rd attempt to complete Initial Assessment  for Outpatient Care Management, left message.         Patient Summary                        7/11/2024  3rd attempt to complete Initial Assessment  for Outpatient Care Management, left message.

## 2024-07-12 ENCOUNTER — TELEPHONE (OUTPATIENT)
Dept: PULMONOLOGY | Facility: CLINIC | Age: 69
End: 2024-07-12
Payer: MEDICARE

## 2024-07-12 ENCOUNTER — TELEPHONE (OUTPATIENT)
Dept: FAMILY MEDICINE | Facility: CLINIC | Age: 69
End: 2024-07-12
Payer: MEDICARE

## 2024-07-12 RX ORDER — FLUTICASONE FUROATE AND VILANTEROL 200; 25 UG/1; UG/1
1 POWDER RESPIRATORY (INHALATION) DAILY
Qty: 60 EACH | Refills: 5 | Status: SHIPPED | OUTPATIENT
Start: 2024-07-12

## 2024-07-12 NOTE — TELEPHONE ENCOUNTER
Bunny Nixon Denial      Close reason: Prior Authorization duplicate/in process  Payer: [employer] - Medicare Case ID: [id number]    [phone]  Note from payer: There is an EOC that was clinically denied within the last 60 days. This request needs to be sent to the Grievance and Appeals Dept. at [facility]. Appeal requests must come from the member or the provider.  View History

## 2024-07-22 DIAGNOSIS — J30.1 NON-SEASONAL ALLERGIC RHINITIS DUE TO POLLEN: ICD-10-CM

## 2024-07-22 DIAGNOSIS — E78.49 OTHER HYPERLIPIDEMIA: ICD-10-CM

## 2024-07-22 RX ORDER — EZETIMIBE 10 MG/1
TABLET ORAL
Qty: 30 TABLET | Refills: 3 | Status: SHIPPED | OUTPATIENT
Start: 2024-07-22

## 2024-07-22 RX ORDER — FLUTICASONE PROPIONATE 50 MCG
SPRAY, SUSPENSION (ML) NASAL
Qty: 16 G | Refills: 3 | Status: SHIPPED | OUTPATIENT
Start: 2024-07-22

## 2024-08-02 ENCOUNTER — PATIENT MESSAGE (OUTPATIENT)
Dept: ADMINISTRATIVE | Facility: HOSPITAL | Age: 69
End: 2024-08-02
Payer: MEDICARE

## 2024-09-17 DIAGNOSIS — R11.0 NAUSEA: ICD-10-CM

## 2024-09-17 DIAGNOSIS — I10 ESSENTIAL HYPERTENSION: ICD-10-CM

## 2024-09-17 RX ORDER — ONDANSETRON HYDROCHLORIDE 8 MG/1
TABLET, FILM COATED ORAL
Qty: 20 TABLET | Refills: 0 | Status: SHIPPED | OUTPATIENT
Start: 2024-09-17

## 2024-09-17 RX ORDER — TRIAMTERENE/HYDROCHLOROTHIAZID 37.5-25 MG
1 TABLET ORAL
Qty: 90 TABLET | Refills: 1 | Status: SHIPPED | OUTPATIENT
Start: 2024-09-17

## 2024-09-23 PROBLEM — J18.9 SEPSIS DUE TO PNEUMONIA: Status: RESOLVED | Noted: 2024-06-20 | Resolved: 2024-09-23

## 2024-09-23 PROBLEM — N17.9 AKI (ACUTE KIDNEY INJURY): Status: RESOLVED | Noted: 2024-06-20 | Resolved: 2024-09-23

## 2024-09-23 PROBLEM — A41.9 SEPSIS DUE TO PNEUMONIA: Status: RESOLVED | Noted: 2024-06-20 | Resolved: 2024-09-23

## 2024-10-25 DIAGNOSIS — I10 ESSENTIAL HYPERTENSION: ICD-10-CM

## 2024-10-25 RX ORDER — LISINOPRIL 40 MG/1
40 TABLET ORAL DAILY
Qty: 90 TABLET | Refills: 1 | Status: SHIPPED | OUTPATIENT
Start: 2024-10-25 | End: 2025-10-25

## 2024-10-31 DIAGNOSIS — Z78.0 MENOPAUSE: ICD-10-CM

## 2024-11-04 ENCOUNTER — PATIENT MESSAGE (OUTPATIENT)
Dept: ADMINISTRATIVE | Facility: HOSPITAL | Age: 69
End: 2024-11-04
Payer: MEDICARE

## 2024-11-25 DIAGNOSIS — E78.49 OTHER HYPERLIPIDEMIA: ICD-10-CM

## 2024-11-25 DIAGNOSIS — F32.A DEPRESSION, UNSPECIFIED DEPRESSION TYPE: ICD-10-CM

## 2024-11-25 RX ORDER — BUPROPION HYDROCHLORIDE 300 MG/1
300 TABLET ORAL DAILY
Qty: 30 TABLET | Refills: 11 | Status: SHIPPED | OUTPATIENT
Start: 2024-11-25 | End: 2025-11-25

## 2024-11-25 RX ORDER — EZETIMIBE 10 MG/1
TABLET ORAL
Qty: 30 TABLET | Refills: 3 | Status: SHIPPED | OUTPATIENT
Start: 2024-11-25

## 2025-01-14 ENCOUNTER — PATIENT MESSAGE (OUTPATIENT)
Dept: ADMINISTRATIVE | Facility: HOSPITAL | Age: 70
End: 2025-01-14
Payer: MEDICARE

## 2025-01-14 DIAGNOSIS — Z00.00 ENCOUNTER FOR MEDICARE ANNUAL WELLNESS EXAM: ICD-10-CM

## 2025-01-24 DIAGNOSIS — J30.1 NON-SEASONAL ALLERGIC RHINITIS DUE TO POLLEN: ICD-10-CM

## 2025-01-24 RX ORDER — FLUTICASONE PROPIONATE 50 MCG
SPRAY, SUSPENSION (ML) NASAL
Qty: 16 G | Refills: 3 | Status: SHIPPED | OUTPATIENT
Start: 2025-01-24

## 2025-01-27 ENCOUNTER — TELEPHONE (OUTPATIENT)
Dept: VASCULAR SURGERY | Facility: CLINIC | Age: 70
End: 2025-01-27
Payer: MEDICARE

## 2025-01-27 ENCOUNTER — TELEPHONE (OUTPATIENT)
Dept: PODIATRY | Facility: CLINIC | Age: 70
End: 2025-01-27
Payer: MEDICARE

## 2025-01-27 NOTE — TELEPHONE ENCOUNTER
Attempted to call pt. No answer, left msg.       ----- Message from RT Lui sent at 1/27/2025  8:28 AM CST -----  Regarding: f/u ED visit 01/25/2025  Needs f/u DX: PVD.   Please call patient to schedule. Thank You:)

## 2025-01-30 ENCOUNTER — TELEPHONE (OUTPATIENT)
Dept: PODIATRY | Facility: CLINIC | Age: 70
End: 2025-01-30
Payer: MEDICARE

## 2025-01-30 NOTE — TELEPHONE ENCOUNTER
----- Message from Stefany sent at 1/30/2025  4:42 PM CST -----  Contact: pt  Type:  Same Day Appointment Request    Caller is requesting a same day appointment.  Caller declined first available appointment listed below.    Name of Caller:pt    When is the first available appointment? March     Symptoms: cellulitis in toe, right big toe    Best Call Back Number: [phone]    Additional Information:  been to ED and antibiotics 2x and is in a lot of pain    Please call to advise    Thanks

## 2025-01-30 NOTE — TELEPHONE ENCOUNTER
Spoke with pt to let them know no sooner appointments right now and I would place her on the wait list. Pt verbalized understanding

## 2025-03-03 RX ORDER — FLUTICASONE FUROATE AND VILANTEROL 200; 25 UG/1; UG/1
1 POWDER RESPIRATORY (INHALATION)
Qty: 180 EACH | Refills: 1 | Status: SHIPPED | OUTPATIENT
Start: 2025-03-03

## 2025-03-03 NOTE — TELEPHONE ENCOUNTER
No care due was identified.  [facility] Embedded Care Due Messages. Reference number: [id number].   3/03/2025 10:36:49 AM CST

## 2025-03-04 NOTE — TELEPHONE ENCOUNTER
Refill Routing Note   Medication(s) are not appropriate for processing by [facility] for the following reason(s):        Drug-disease interaction  ED/Hospital Visit since last OV with provider    [facility] action(s):  Defer           Extended chart review required: Yes     Appointments  past 12m or future 3m with PCP    Date Provider   Last Visit   7/5/2024 Navjot Baig MD   Next Visit   4/1/2025 Navjot Baig MD   ED visits in past 90 days: 1        Note composed:8:29 PM 03/03/2025

## 2025-03-19 ENCOUNTER — TELEPHONE (OUTPATIENT)
Dept: FAMILY MEDICINE | Facility: CLINIC | Age: 70
End: 2025-03-19
Payer: MEDICARE

## 2025-03-19 NOTE — TELEPHONE ENCOUNTER
----- Message from Ramona sent at 3/19/2025  8:40 AM CDT -----  Vm- 7:31- pt has an appt today and she can not make it, got up with a gi bug [phone]

## 2025-03-24 ENCOUNTER — PATIENT MESSAGE (OUTPATIENT)
Dept: FAMILY MEDICINE | Facility: CLINIC | Age: 70
End: 2025-03-24
Payer: MEDICARE

## 2025-04-11 DIAGNOSIS — E78.2 MIXED DYSLIPIDEMIA: Primary | ICD-10-CM

## 2025-04-11 DIAGNOSIS — Z12.31 ENCOUNTER FOR SCREENING MAMMOGRAM FOR MALIGNANT NEOPLASM OF BREAST: ICD-10-CM

## 2025-04-11 DIAGNOSIS — Z12.11 COLON CANCER SCREENING: ICD-10-CM

## 2025-04-11 DIAGNOSIS — Z13.820 OSTEOPOROSIS SCREENING: ICD-10-CM

## 2025-04-11 DIAGNOSIS — I10 ESSENTIAL HYPERTENSION: ICD-10-CM

## 2025-04-11 DIAGNOSIS — F17.210 CIGARETTE SMOKER: ICD-10-CM

## 2025-04-11 DIAGNOSIS — Z72.0 TOBACCO USE: ICD-10-CM

## 2025-04-11 DIAGNOSIS — Z78.0 MENOPAUSE: ICD-10-CM

## 2025-04-11 NOTE — TELEPHONE ENCOUNTER
Refill Routing Note   Medication(s) are not appropriate for processing by [facility] for the following reason(s):        Required vitals abnormal  01/25/25 (!) 167/80       OR action(s):  Defer     Requires labs : Yes      Medication Therapy Plan: 01/25/25 (!) 167/80      Appointments  past 12m or future 3m with PCP    Date Provider   Last Visit   7/5/2024 Navjot Baig MD   Next Visit   Visit date not found Navjot Baig MD   ED visits in past 90 days: 1        Note composed:11:47 AM 04/11/2025

## 2025-04-11 NOTE — TELEPHONE ENCOUNTER
Care Due:                  Date            Visit Type   Department     Provider  --------------------------------------------------------------------------------                                             [facility]   [address]  Last Visit: 07-      PATIENT      FAMILY Rhonda Morfin  Next Visit: None Scheduled  None         None Found                                                            Last  Test          Frequency    Reason                     Performed    Due Date  --------------------------------------------------------------------------------    Lipid Panel.  12 months..  ezetimibe................  07- 06-    [facility] Embedded Care Due Messages. Reference number: [id number].   4/11/2025 9:30:25 AM CDT

## 2025-04-13 RX ORDER — TRIAMTERENE/HYDROCHLOROTHIAZID 37.5-25 MG
1 TABLET ORAL
Qty: 90 TABLET | Refills: 0 | Status: SHIPPED | OUTPATIENT
Start: 2025-04-13

## 2025-04-13 NOTE — TELEPHONE ENCOUNTER
Please get her a follow up appointment.   Please let her know that I need her to get her labs completed and I have ordered her mammogram, Bone density screening, Low dose lung CT screening for lung cancer ( She was supposed to get these completed last year) .

## 2025-04-14 DIAGNOSIS — E78.49 OTHER HYPERLIPIDEMIA: ICD-10-CM

## 2025-04-15 NOTE — TELEPHONE ENCOUNTER
Refill Routing Note   Medication(s) are not appropriate for processing by [facility] for the following reason(s):        ED/Hospital Visit since last OV with provider  Drug-disease interaction      [facility] action(s):  Defer      Medication Therapy Plan: Patient was seen in the ED on 1/25/25 for PVD with cellulitis on great toe of right foot.      Appointments  past 12m or future 3m with PCP    Date Provider   Last Visit   7/5/2024 Navjot Baig MD   Next Visit   Visit date not found Navjot Baig MD   ED visits in past 90 days: 1        Note composed:9:09 PM 04/14/2025

## 2025-04-15 NOTE — TELEPHONE ENCOUNTER
Called patient to try and schedule follow up appointment and advise testing has been ordered. Received voicemail, left message for the patient with call back number.

## 2025-04-16 RX ORDER — EZETIMIBE 10 MG/1
10 TABLET ORAL DAILY
Qty: 90 TABLET | Refills: 0 | Status: SHIPPED | OUTPATIENT
Start: 2025-04-16

## 2025-04-28 ENCOUNTER — PATIENT MESSAGE (OUTPATIENT)
Dept: ADMINISTRATIVE | Facility: HOSPITAL | Age: 70
End: 2025-04-28
Payer: MEDICARE

## 2025-06-02 ENCOUNTER — TELEPHONE (OUTPATIENT)
Dept: FAMILY MEDICINE | Facility: CLINIC | Age: 70
End: 2025-06-02
Payer: MEDICARE

## 2025-06-19 ENCOUNTER — PATIENT OUTREACH (OUTPATIENT)
Dept: ADMINISTRATIVE | Facility: HOSPITAL | Age: 70
End: 2025-06-19
Payer: MEDICARE

## 2025-06-19 NOTE — PROGRESS NOTES
"Care Coordination Encounter Details:       MyChart Portal Status:         []  Reviewed MyChart Portal Status offered / enrolled if applicable        Additional Notes:     MyChart Outcomes: Has unread portal messages.         Updates Requested / Reviewed:        Updated Care Coordination Note, Care Everywhere, , External Sources: [facility], [facility], [facility], and [facility], and Immunizations Reconciliation Completed or Queried: [place]         Health Maintenance Screening(s) Due:      Health Maintenance Topics Overdue:      VBHM Score: 5     Colon Cancer Screening  Osteoporosis Screening  Mammogram  Hemoglobin A1c  LDCT Lung Screen    Pneumonia Vaccine  Shingles/Zoster Vaccine  RSV Vaccine                  Health Maintenance Topic(s) Outreach Outcomes & Actions Taken:    Breast Cancer Screening - Outreach Outcomes & Actions Taken  : Reminder letter mail.    Colorectal Cancer Screening - Outreach Outcomes & Actions Taken  : Reminder letter mail.    Osteoporosis Screening - Outreach Outcomes & Actions Taken  : Reminder letter mail.         Additional Notes:             Chronic Disease Management:     Diabetes Measures      No results found for: "LABA1C", "HGBA1C"        [x]  Reviewed chart for active Diabetes diagnosis     []  Scheduled necessary follow up appointments if needed         Additional Notes:             Hypertension Measures        BP Readings from Last 1 Encounters:   01/25/25 (!) 167/80           [x]  Reviewed chart for active Hypertension diagnosis     []  Reviewed & documented Home BP Cuff     []  Documented a Remote BP if needed & applicable     []  Scheduled necessary follow up appointments with Primary Care if needed         Additional Notes:             Provider Team Continuity:     Last PCP Visit Date: 7/5/2024          [x]  Reviewed Primary Care Provider Visits, Annual Wellness Visit, and Future          Appointments to ensure appointments have been scheduled and/or           completed   "      Additional Notes:             Social Determinants of Health          [x]  Reviewed, completed, and/or updated the following sections:                  Food Insecurity, Transportation Needs, Financial Resource Strain,                 Tobacco Use        Additional Notes:  Letter mailed regarding resources.           Care Management, Digital Medicine, and/or Education Referrals    OPCM Risk Score: 57.8         Next Steps - Referral Actions: Digital Medicine Outcomes and Actions Taken: Pt Declined or Not Eligible        Additional Notes:

## 2025-06-19 NOTE — LETTER
June 19, 2025    Luz Marina Moses  [address] [facility] [address]             [place] [facility]  [address]  [facility] [address]  Phone: [phone]        Dear Glenda, [facility] is committed to your overall health. Periodically we review the health information in your chart to make sure you are up to date on all of your recommended tests and/or procedures.       Our review of your chart shows that you may be due for       VB Score: 5     Colon Cancer Screening  Osteoporosis Screening  Mammogram  Hemoglobin A1c  LDCT Lung Screen    Pneumonia Vaccine  Shingles/Zoster Vaccine  RSV Vaccine                If you have had any of the above done at another facility, please let us know and we will request a copy of the report to update your [facility] record.       At your convenience I would like to speak to you to help get these items scheduled (if needed) and also see if there is anything else we can do to help you. Please send me a message via your patient portal or give me a call at [phone].  I am looking forward to speaking with you soon.       I was also reaching out to offer some resources [facility] has. These resources are for patients that maybe having financial difficulties, like paying for prescriptions  or food difficulties. Also if your having transportation problems , like getting to and from your doctor appointments. If you need assistance , we will put in a referral for you. One of our Community Health Workers will reach out to you.       Thank You,    Sue RABAGO  Clinical Care Coordinator  [facility]  [facility]

## 2025-06-20 DIAGNOSIS — R11.0 NAUSEA: ICD-10-CM

## 2025-06-22 RX ORDER — ONDANSETRON 8 MG/1
TABLET, FILM COATED ORAL
Qty: 20 TABLET | Refills: 0 | Status: SHIPPED | OUTPATIENT
Start: 2025-06-22

## 2025-07-24 ENCOUNTER — PATIENT MESSAGE (OUTPATIENT)
Dept: ADMINISTRATIVE | Facility: HOSPITAL | Age: 70
End: 2025-07-24
Payer: MEDICARE

## 2025-07-30 DIAGNOSIS — J30.1 NON-SEASONAL ALLERGIC RHINITIS DUE TO POLLEN: ICD-10-CM

## 2025-07-30 NOTE — TELEPHONE ENCOUNTER
Refill Routing Note   Medication(s) are not appropriate for processing by [facility] for the following reason(s):        ED/Hospital Visit since last OV with provider    [facility] action(s):  Defer               Appointments  past 12m or future 3m with PCP    Date Provider   Last Visit   7/5/2024 Navjot Baig MD   Next Visit   8/18/2025 Navjot Baig MD   ED visits in past 90 days: 0        Note composed:1:48 PM 07/30/2025

## 2025-07-30 NOTE — TELEPHONE ENCOUNTER
No care due was identified.  [facility] Embedded Care Due Messages. Reference number: [id number].   7/30/2025 1:44:36 PM CDT

## 2025-07-31 RX ORDER — FLUTICASONE PROPIONATE 50 MCG
SPRAY, SUSPENSION (ML) NASAL
Qty: 32 G | Refills: 0 | Status: SHIPPED | OUTPATIENT
Start: 2025-07-31

## 2025-08-18 ENCOUNTER — OFFICE VISIT (OUTPATIENT)
Dept: FAMILY MEDICINE | Facility: CLINIC | Age: 70
End: 2025-08-18
Payer: MEDICARE

## 2025-08-18 VITALS
HEIGHT: 66 IN | BODY MASS INDEX: 24.13 KG/M2 | SYSTOLIC BLOOD PRESSURE: 138 MMHG | DIASTOLIC BLOOD PRESSURE: 64 MMHG | HEART RATE: 98 BPM | WEIGHT: 150.13 LBS | OXYGEN SATURATION: 96 %

## 2025-08-18 DIAGNOSIS — R93.0 ABNORMAL HEAD CT: ICD-10-CM

## 2025-08-18 DIAGNOSIS — L98.9 SKIN LESION: ICD-10-CM

## 2025-08-18 DIAGNOSIS — Z78.0 MENOPAUSE: ICD-10-CM

## 2025-08-18 DIAGNOSIS — Z12.11 COLON CANCER SCREENING: ICD-10-CM

## 2025-08-18 DIAGNOSIS — F17.210 CIGARETTE SMOKER: ICD-10-CM

## 2025-08-18 DIAGNOSIS — E78.2 MIXED HYPERLIPIDEMIA: ICD-10-CM

## 2025-08-18 DIAGNOSIS — Z23 NEED FOR SHINGLES VACCINE: ICD-10-CM

## 2025-08-18 DIAGNOSIS — G72.0 STATIN MYOPATHY: ICD-10-CM

## 2025-08-18 DIAGNOSIS — I10 PRIMARY HYPERTENSION: ICD-10-CM

## 2025-08-18 DIAGNOSIS — Z12.31 ENCOUNTER FOR SCREENING MAMMOGRAM FOR MALIGNANT NEOPLASM OF BREAST: ICD-10-CM

## 2025-08-18 DIAGNOSIS — T46.6X5A STATIN MYOPATHY: ICD-10-CM

## 2025-08-18 DIAGNOSIS — M79.7 FIBROMYALGIA: ICD-10-CM

## 2025-08-18 DIAGNOSIS — J44.1 CHRONIC OBSTRUCTIVE PULMONARY DISEASE WITH ACUTE EXACERBATION: Primary | ICD-10-CM

## 2025-08-18 PROCEDURE — 99214 OFFICE O/P EST MOD 30 MIN: CPT | Mod: HCNC,S$GLB,, | Performed by: FAMILY MEDICINE

## 2025-08-18 PROCEDURE — 99999 PR PBB SHADOW E&M-EST. PATIENT-LVL V: CPT | Mod: PBBFAC,HCNC,, | Performed by: FAMILY MEDICINE

## 2025-08-18 PROCEDURE — 3075F SYST BP GE 130 - 139MM HG: CPT | Mod: CPTII,HCNC,S$GLB, | Performed by: FAMILY MEDICINE

## 2025-08-18 PROCEDURE — 4010F ACE/ARB THERAPY RXD/TAKEN: CPT | Mod: CPTII,HCNC,S$GLB, | Performed by: FAMILY MEDICINE

## 2025-08-18 PROCEDURE — 3008F BODY MASS INDEX DOCD: CPT | Mod: CPTII,HCNC,S$GLB, | Performed by: FAMILY MEDICINE

## 2025-08-18 PROCEDURE — 3078F DIAST BP <80 MM HG: CPT | Mod: CPTII,HCNC,S$GLB, | Performed by: FAMILY MEDICINE

## 2025-08-18 PROCEDURE — 1159F MED LIST DOCD IN RCRD: CPT | Mod: CPTII,HCNC,S$GLB, | Performed by: FAMILY MEDICINE

## 2025-08-18 PROCEDURE — G2211 COMPLEX E/M VISIT ADD ON: HCPCS | Mod: HCNC,S$GLB,, | Performed by: FAMILY MEDICINE

## 2025-08-18 RX ORDER — EZETIMIBE 10 MG/1
10 TABLET ORAL DAILY
Qty: 90 TABLET | Refills: 1 | Status: SHIPPED | OUTPATIENT
Start: 2025-08-18

## 2025-08-18 RX ORDER — LISINOPRIL 40 MG/1
40 TABLET ORAL DAILY
Qty: 90 TABLET | Refills: 1 | Status: SHIPPED | OUTPATIENT
Start: 2025-08-18 | End: 2026-08-18

## 2025-08-18 RX ORDER — ZOSTER VACCINE RECOMBINANT, ADJUVANTED 50 MCG/0.5
0.5 KIT INTRAMUSCULAR ONCE
Qty: 1 EACH | Refills: 0 | Status: SHIPPED | OUTPATIENT
Start: 2025-08-18 | End: 2025-08-18

## 2025-08-18 RX ORDER — TRIAMTERENE AND HYDROCHLOROTHIAZIDE 37.5; 25 MG/1; MG/1
1 TABLET ORAL DAILY
Qty: 90 TABLET | Refills: 1 | Status: SHIPPED | OUTPATIENT
Start: 2025-08-18

## 2025-08-18 RX ORDER — FLUTICASONE FUROATE AND VILANTEROL 200; 25 UG/1; UG/1
1 POWDER RESPIRATORY (INHALATION) DAILY
Qty: 180 EACH | Refills: 1 | Status: SHIPPED | OUTPATIENT
Start: 2025-08-18

## 2025-08-18 RX ORDER — RESPIRATORY SYNCYTIAL VISUS VACCINE RECOMBINANT, ADJUVANTED 120MCG/0.5
0.5 KIT INTRAMUSCULAR ONCE
Qty: 0.5 ML | Refills: 0 | Status: CANCELLED | OUTPATIENT
Start: 2025-08-18 | End: 2025-08-18

## 2025-08-26 ENCOUNTER — PATIENT MESSAGE (OUTPATIENT)
Dept: ADMINISTRATIVE | Facility: HOSPITAL | Age: 70
End: 2025-08-26
Payer: MEDICARE